# Patient Record
Sex: FEMALE | Race: WHITE | Employment: FULL TIME | URBAN - METROPOLITAN AREA
[De-identification: names, ages, dates, MRNs, and addresses within clinical notes are randomized per-mention and may not be internally consistent; named-entity substitution may affect disease eponyms.]

---

## 2019-03-13 ENCOUNTER — HOSPITAL ENCOUNTER (EMERGENCY)
Age: 39
Discharge: HOME | End: 2019-03-13
Payer: OTHER MISCELLANEOUS

## 2019-03-13 ENCOUNTER — APPOINTMENT (OUTPATIENT)
Dept: CT IMAGING | Age: 39
End: 2019-03-13
Payer: OTHER MISCELLANEOUS

## 2019-03-13 VITALS
DIASTOLIC BLOOD PRESSURE: 83 MMHG | HEIGHT: 62 IN | WEIGHT: 136 LBS | TEMPERATURE: 98.4 F | SYSTOLIC BLOOD PRESSURE: 123 MMHG | BODY MASS INDEX: 25.03 KG/M2 | HEART RATE: 82 BPM | OXYGEN SATURATION: 99 % | RESPIRATION RATE: 16 BRPM

## 2019-03-13 DIAGNOSIS — S16.1XXA ACUTE STRAIN OF NECK MUSCLE, INITIAL ENCOUNTER: Primary | ICD-10-CM
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
## 2019-03-13 LAB
HCG, URINE, POC: NEGATIVE
Lab: NORMAL
NEGATIVE QC PASS/FAIL: NORMAL
POSITIVE QC PASS/FAIL: NORMAL

## 2019-03-13 PROCEDURE — 96372 THER/PROPH/DIAG INJ SC/IM: CPT

## 2019-03-13 PROCEDURE — 72125 CT NECK SPINE W/O DYE: CPT

## 2019-03-13 PROCEDURE — 6360000002 HC RX W HCPCS: Performed by: NURSE PRACTITIONER

## 2019-03-13 PROCEDURE — 70450 CT HEAD/BRAIN W/O DYE: CPT

## 2019-03-13 PROCEDURE — 99284 EMERGENCY DEPT VISIT MOD MDM: CPT

## 2019-03-13 RX ORDER — ORPHENADRINE CITRATE 30 MG/ML
60 INJECTION INTRAMUSCULAR; INTRAVENOUS ONCE
Status: COMPLETED | OUTPATIENT
Start: 2019-03-13 | End: 2019-03-13

## 2019-03-13 RX ORDER — NAPROXEN 500 MG/1
500 TABLET ORAL 2 TIMES DAILY PRN
Qty: 28 TABLET | Refills: 0 | Status: ON HOLD | OUTPATIENT
Start: 2019-03-13 | End: 2022-09-09 | Stop reason: HOSPADM

## 2019-03-13 RX ORDER — ORPHENADRINE CITRATE 100 MG/1
100 TABLET, EXTENDED RELEASE ORAL 2 TIMES DAILY
Qty: 20 TABLET | Refills: 0 | Status: SHIPPED | OUTPATIENT
Start: 2019-03-13 | End: 2019-03-23

## 2019-03-13 RX ADMIN — ORPHENADRINE CITRATE 60 MG: 60 INJECTION INTRAMUSCULAR; INTRAVENOUS at 21:17

## 2019-03-13 ASSESSMENT — PAIN SCALES - GENERAL: PAINLEVEL_OUTOF10: 5

## 2020-05-05 ENCOUNTER — HOSPITAL ENCOUNTER (OUTPATIENT)
Age: 40
Discharge: HOME | End: 2020-05-07
Payer: COMMERCIAL

## 2020-05-05 PROCEDURE — U0003 INFECTIOUS AGENT DETECTION BY NUCLEIC ACID (DNA OR RNA); SEVERE ACUTE RESPIRATORY SYNDROME CORONAVIRUS 2 (SARS-COV-2) (CORONAVIRUS DISEASE [COVID-19]), AMPLIFIED PROBE TECHNIQUE, MAKING USE OF HIGH THROUGHPUT TECHNOLOGIES AS DESCRIBED BY CMS-2020-01-R: HCPCS

## 2020-05-07 LAB
SARS-COV-2: NOT DETECTED
SOURCE: NORMAL

## 2021-02-16 LAB
SARS-COV-2: NOT DETECTED
SOURCE: NORMAL

## 2021-02-23 LAB
SARS-COV-2: NOT DETECTED
SOURCE: NORMAL

## 2021-02-27 LAB — SARS-COV-2, PCR: NOT DETECTED

## 2021-03-06 LAB
SARS-COV-2: NOT DETECTED
SOURCE: NORMAL

## 2021-03-12 LAB
SARS-COV-2: NOT DETECTED
SOURCE: NORMAL

## 2021-03-19 LAB
SARS-COV-2: NOT DETECTED
SOURCE: NORMAL

## 2021-03-31 LAB
SARS-COV-2: NOT DETECTED
SOURCE: NORMAL

## 2021-04-08 LAB
SARS-COV-2: NOT DETECTED
SOURCE: NORMAL

## 2021-04-09 LAB
SARS-COV-2: NOT DETECTED
SOURCE: NORMAL

## 2021-04-14 LAB
SARS-COV-2: NOT DETECTED
SOURCE: NORMAL

## 2021-04-21 LAB
SARS-COV-2: NOT DETECTED
SOURCE: NORMAL

## 2021-04-27 LAB
SARS-COV-2: NOT DETECTED
SOURCE: NORMAL

## 2021-05-05 LAB
SARS-COV-2: NOT DETECTED
SOURCE: NORMAL

## 2021-05-12 LAB
SARS-COV-2: NOT DETECTED
SOURCE: NORMAL

## 2021-06-15 LAB
SARS-COV-2: NOT DETECTED
SOURCE: NORMAL

## 2021-09-08 LAB
SARS-COV-2: NOT DETECTED
SOURCE: NORMAL

## 2021-10-06 ENCOUNTER — OFFICE VISIT (OUTPATIENT)
Dept: FAMILY MEDICINE CLINIC | Age: 41
End: 2021-10-06
Payer: COMMERCIAL

## 2021-10-06 VITALS
TEMPERATURE: 97 F | WEIGHT: 138 LBS | BODY MASS INDEX: 25.24 KG/M2 | DIASTOLIC BLOOD PRESSURE: 72 MMHG | SYSTOLIC BLOOD PRESSURE: 126 MMHG | HEART RATE: 64 BPM | OXYGEN SATURATION: 100 %

## 2021-10-06 DIAGNOSIS — Z20.822 SUSPECTED COVID-19 VIRUS INFECTION: Primary | ICD-10-CM

## 2021-10-06 DIAGNOSIS — R11.2 NAUSEA AND VOMITING, INTRACTABILITY OF VOMITING NOT SPECIFIED, UNSPECIFIED VOMITING TYPE: ICD-10-CM

## 2021-10-06 DIAGNOSIS — R10.84 GENERALIZED ABDOMINAL PAIN: ICD-10-CM

## 2021-10-06 LAB
Lab: NORMAL
PERFORMING INSTRUMENT: NORMAL
QC PASS/FAIL: NORMAL
SARS-COV-2, POC: NORMAL

## 2021-10-06 PROCEDURE — 1036F TOBACCO NON-USER: CPT | Performed by: PHYSICIAN ASSISTANT

## 2021-10-06 PROCEDURE — G8419 CALC BMI OUT NRM PARAM NOF/U: HCPCS | Performed by: PHYSICIAN ASSISTANT

## 2021-10-06 PROCEDURE — G8427 DOCREV CUR MEDS BY ELIG CLIN: HCPCS | Performed by: PHYSICIAN ASSISTANT

## 2021-10-06 PROCEDURE — G8484 FLU IMMUNIZE NO ADMIN: HCPCS | Performed by: PHYSICIAN ASSISTANT

## 2021-10-06 PROCEDURE — 87426 SARSCOV CORONAVIRUS AG IA: CPT | Performed by: PHYSICIAN ASSISTANT

## 2021-10-06 PROCEDURE — 99203 OFFICE O/P NEW LOW 30 MIN: CPT | Performed by: PHYSICIAN ASSISTANT

## 2021-10-06 NOTE — PROGRESS NOTES
500 MG tablet, Take 1 tablet by mouth 2 times daily as needed for Pain, Disp: 28 tablet, Rfl: 0    ibuprofen (IBU) 600 MG tablet, Take 1 tablet by mouth every 8 hours as needed for Pain., Disp: 20 tablet, Rfl: 0    traMADol (ULTRAM) 50 MG tablet, Take 50 mg by mouth every 6 hours as needed. , Disp: , Rfl:     tamsulosin (FLOMAX) 0.4 MG capsule, Take 1 capsule by mouth daily for 30 days. , Disp: 30 capsule, Rfl: 0    ondansetron (ZOFRAN) 4 MG tablet, Take 1 tablet by mouth every 8 hours as needed for Nausea., Disp: 20 tablet, Rfl: 0    Allergies:   No Known Allergies    Social History:     Social History     Tobacco Use    Smoking status: Never Smoker    Smokeless tobacco: Never Used   Substance Use Topics    Alcohol use: No    Drug use: No       Patient lives at home. Physical Exam:     Vitals:    10/06/21 1022   BP: 126/72   Pulse: 64   Temp: 97 °F (36.1 °C)   SpO2: 100%   Weight: 138 lb (62.6 kg)       Exam:  Physical Exam  Nurses note and vital signs reviewed and patient is not hypoxic. General: The patient appears well and in no apparent distress. Patient is resting comfortably on cart. Skin: Warm, dry, no pallor noted. There is no rash noted. Head: Normocephalic, atraumatic. Eye: Normal conjunctiva  Ears, Nose, Mouth, and Throat: Wearing mask  Cardiovascular: Regular Rate and Rhythm  Respiratory: Patient is in no distress, no accessory muscle use, lungs are clear to auscultation, no wheezing, crackles or rhonchi  Back: Non-tender, no CVA tenderness bilaterally to percussion. GI: Normal bowel sounds, no tenderness to palpation, no masses appreciated. No rebound, guarding, or rigidity noted.   Musculoskeletal: Normal range of motion of the upper and lower extremities  Neurological: A&O x4, normal speech        Testing:     Results for orders placed or performed in visit on 10/06/21   POCT COVID-19, Antigen   Result Value Ref Range    SARS-COV-2, POC Not-Detected Not Detected    Lot Number [id number] QC Pass/Fail pass     Performing Instrument BD Veritor            Medical Decision Making:     Vital signs reviewed    Past medical history reviewed. Allergies reviewed. Medications reviewed. Patient on arrival does not appear to be in any apparent distress or discomfort. The patient has been seen and evaluated. The patient does not appear to be toxic or lethargic. The patient had a Covid test that was negative here in the office. The patient will have PCR testing. The patient will continue to monitor symptoms. The patient is refusing to be evaluated in the emergency department at this time. The patient will be contacted with results of the Covid testing. The patient is to return to express care or go directly to the emergency department should any of the signs or symptoms worsen. The patient is to followup with primary care physician in 2-3 days for repeat evaluation. The patient has no other questions or concerns at this time the patient will be discharged home. Clinical Impression:   Janina was seen today for abdominal pain, emesis and concern for covid-19. Diagnoses and all orders for this visit:    Suspected COVID-19 virus infection  -     POCT COVID-19, Antigen  -     COVID-19 Ambulatory; Future    Generalized abdominal pain    Nausea and vomiting, intractability of vomiting not specified, unspecified vomiting type        The patient is to call for any concerns or return if any of the signs or symptoms worsen. The patient is to follow-up with PCP in the next 2-3 days for repeat evaluation repeat assessment or go directly to the emergency department.      SIGNATURE: Grecia Beck III, PA-C

## 2022-03-03 ENCOUNTER — INITIAL PRENATAL (OUTPATIENT)
Dept: OBGYN | Age: 42
End: 2022-03-03
Payer: COMMERCIAL

## 2022-03-03 VITALS
BODY MASS INDEX: 29.26 KG/M2 | HEART RATE: 101 BPM | DIASTOLIC BLOOD PRESSURE: 63 MMHG | SYSTOLIC BLOOD PRESSURE: 139 MMHG | WEIGHT: 160 LBS

## 2022-03-03 DIAGNOSIS — Z34.91 PRENATAL CARE IN FIRST TRIMESTER: ICD-10-CM

## 2022-03-03 DIAGNOSIS — Z98.891 HISTORY OF CESAREAN DELIVERY: ICD-10-CM

## 2022-03-03 DIAGNOSIS — Z12.4 SCREENING FOR CERVICAL CANCER: Primary | ICD-10-CM

## 2022-03-03 DIAGNOSIS — O09.521 MULTIGRAVIDA OF ADVANCED MATERNAL AGE IN FIRST TRIMESTER: ICD-10-CM

## 2022-03-03 LAB
AMPHETAMINE SCREEN, URINE: NOT DETECTED
BARBITURATE SCREEN URINE: NOT DETECTED
BENZODIAZEPINE SCREEN, URINE: NOT DETECTED
CANNABINOID SCREEN URINE: NOT DETECTED
COCAINE METABOLITE SCREEN URINE: NOT DETECTED
FENTANYL SCREEN, URINE: NOT DETECTED
Lab: NORMAL
METHADONE SCREEN, URINE: NOT DETECTED
OPIATE SCREEN URINE: NOT DETECTED
OXYCODONE URINE: NOT DETECTED
PHENCYCLIDINE SCREEN URINE: NOT DETECTED

## 2022-03-03 PROCEDURE — G8427 DOCREV CUR MEDS BY ELIG CLIN: HCPCS | Performed by: OBSTETRICS & GYNECOLOGY

## 2022-03-03 PROCEDURE — G8419 CALC BMI OUT NRM PARAM NOF/U: HCPCS | Performed by: OBSTETRICS & GYNECOLOGY

## 2022-03-03 PROCEDURE — 1036F TOBACCO NON-USER: CPT | Performed by: OBSTETRICS & GYNECOLOGY

## 2022-03-03 PROCEDURE — G8484 FLU IMMUNIZE NO ADMIN: HCPCS | Performed by: OBSTETRICS & GYNECOLOGY

## 2022-03-03 PROCEDURE — 99203 OFFICE O/P NEW LOW 30 MIN: CPT | Performed by: OBSTETRICS & GYNECOLOGY

## 2022-03-03 PROCEDURE — 99204 OFFICE O/P NEW MOD 45 MIN: CPT | Performed by: OBSTETRICS & GYNECOLOGY

## 2022-03-03 NOTE — PROGRESS NOTES
Yao Justice     Patient presents for new OB visit. Patient is 10w5d by LMP. Patient states she may have failed her glucose test with her last baby but did not have gestational diabetes. Baby was 9 pounds 1 ounce and had problems regulating his sugar. Will order early glucola. Prenatal labs ordered, referral placed to [facility]. Dating scan ordered as well. Patient has had three prior  sections. Will be a repeat c/s at 39 weeks. Advanced maternal age. Toxins, food, vaccines reviewed. Reviewed COVID vaccine, including benefits to mother and fetus of vaccine and risks of COVID infection during pregnancy. Patient has been vaccinated against COVID. Cell free DNA reviewed, patient would like. [id number] ordered today. Patient is taking a prenatal vitamin daily. History reviewed. No pertinent past medical history. Past Surgical History:   Procedure Laterality Date     SECTION      HERNIA REPAIR      TYMPANOSTOMY TUBE PLACEMENT          History reviewed. No pertinent family history. Social History     Tobacco History     Smoking Status  Never Smoker    Smokeless Tobacco Use  Never Used          Alcohol History     Alcohol Use Status  No          Drug Use     Drug Use Status  No          Sexual Activity     Sexually Active  Not Asked                  Current Outpatient Medications:     naproxen (NAPROSYN) 500 MG tablet, Take 1 tablet by mouth 2 times daily as needed for Pain (Patient not taking: Reported on 3/3/2022), Disp: 28 tablet, Rfl: 0    ibuprofen (IBU) 600 MG tablet, Take 1 tablet by mouth every 8 hours as needed for Pain., Disp: 20 tablet, Rfl: 0    traMADol (ULTRAM) 50 MG tablet, Take 50 mg by mouth every 6 hours as needed. (Patient not taking: Reported on 3/3/2022), Disp: , Rfl:     tamsulosin (FLOMAX) 0.4 MG capsule, Take 1 capsule by mouth daily for 30 days. , Disp: 30 capsule, Rfl: 0    ondansetron (ZOFRAN) 4 MG tablet, Take 1 tablet by mouth every 8 hours as needed for Nausea. (Patient not taking: Reported on 3/3/2022), Disp: 20 tablet, Rfl: 0     No Known Allergies     There were no vitals filed for this visit. Physical Exam:  General: pleasant, alert     Breasts: deferred     Pelvic exam: normal external genitalia, vulva, vagina, cervix, uterus and adnexa. Elis Richey was seen today for initial prenatal visit, other and nausea & vomiting. Diagnoses and all orders for this visit:    Screening for cervical cancer  -     PAP SMEAR    Prenatal care in first trimester  -      OB LESS THAN 14 WEEKS SINGLE OR FIRST GESTATION; Future  -     Ambulatory referral to Maternal Fetal  -     CBC; Future  -     Cystic fibrosis carrier study; Future  -     Miscellaneous Sendout; Future  -     Hepatitis B Surface Antigen; Future  -     Hepatitis C Antibody; Future  -     HIV Screen; Future  -     Varicella Zoster Antibody, IgG; Future  -     Urine Drug Screen; Future  -     Culture, Urine; Future  -     Type and Screen; Future  -     Miscellaneous Sendout; Future  -     Rubella; Future  -     RPR Reflex to Titer and TPPA; Future  -     Glucose tolerance, 1 hour; Future  -     Miscellaneous sendout 3; Future    Multigravida of advanced maternal age in first trimester  -     Ambulatory referral to Maternal Fetal    History of  delivery  -     Ambulatory referral to Maternal Fetal          Return in about 4 weeks (around 3/31/2022) for OB visit.      Marlee Taylor MD

## 2022-03-03 NOTE — PROGRESS NOTES
Patient alert and pleasant with no complaints. Here today for prenatal visit. Fetal heart tones obtained without difficulty. Urine for pregnancy obtained with positive  Results. Pelvic exam, pap smear obtained, labeled and hand delivered to lab. Urine obtained, labled and sent to lab  Lab requisitions given and pateint did leave without the MaterniT 21 kit . Will call patgient   Discharge instructions have been discussed with the patient. Patient advised to call our office with any questions or concerns. Voiced understanding.

## 2022-03-05 LAB — URINE CULTURE, ROUTINE: NORMAL

## 2022-03-07 LAB
CHLAMYDIA BY THIN PREP: NEGATIVE
N. GONORRHOEAE DNA, THIN PREP: NEGATIVE
SOURCE: NORMAL

## 2022-03-08 ENCOUNTER — HOSPITAL ENCOUNTER (OUTPATIENT)
Age: 42
Discharge: HOME | End: 2022-03-08
Payer: COMMERCIAL

## 2022-03-08 DIAGNOSIS — Z34.91 PRENATAL CARE IN FIRST TRIMESTER: ICD-10-CM

## 2022-03-08 LAB
ABO/RH: NORMAL
ANTIBODY SCREEN: NORMAL
GLUCOSE TOLERANCE SCREEN 50G: 123 MG/DL (ref 70–140)
HCT VFR BLD CALC: 36 % (ref 34–48)
HEMOGLOBIN: 11.8 G/DL (ref 11.5–15.5)
Lab: NORMAL
MCH RBC QN AUTO: 28.9 PG (ref 26–35)
MCHC RBC AUTO-ENTMCNC: 32.8 % (ref 32–34.5)
MCV RBC AUTO: 88.2 FL (ref 80–99.9)
PDW BLD-RTO: 12.8 FL (ref 11.5–15)
PLATELET # BLD: 310 E9/L (ref 130–450)
PMV BLD AUTO: 9.9 FL (ref 7–12)
RBC # BLD: 4.08 E12/L (ref 3.5–5.5)
REPORT: NORMAL
THIS TEST SENT TO: NORMAL
WBC # BLD: 10.4 E9/L (ref 4.5–11.5)

## 2022-03-08 PROCEDURE — 83021 HEMOGLOBIN CHROMOTOGRAPHY: CPT

## 2022-03-08 PROCEDURE — 86762 RUBELLA ANTIBODY: CPT

## 2022-03-08 PROCEDURE — 36415 COLL VENOUS BLD VENIPUNCTURE: CPT

## 2022-03-08 PROCEDURE — 86900 BLOOD TYPING SEROLOGIC ABO: CPT

## 2022-03-08 PROCEDURE — 85027 COMPLETE CBC AUTOMATED: CPT

## 2022-03-08 PROCEDURE — 87340 HEPATITIS B SURFACE AG IA: CPT

## 2022-03-08 PROCEDURE — 86850 RBC ANTIBODY SCREEN: CPT

## 2022-03-08 PROCEDURE — 86803 HEPATITIS C AB TEST: CPT

## 2022-03-08 PROCEDURE — 81329 SMN1 GENE DOS/DELETION ALYS: CPT

## 2022-03-08 PROCEDURE — 86901 BLOOD TYPING SEROLOGIC RH(D): CPT

## 2022-03-08 PROCEDURE — 83020 HEMOGLOBIN ELECTROPHORESIS: CPT

## 2022-03-08 PROCEDURE — 82950 GLUCOSE TEST: CPT

## 2022-03-08 PROCEDURE — 86703 HIV-1/HIV-2 1 RESULT ANTBDY: CPT

## 2022-03-08 PROCEDURE — 81220 CFTR GENE COM VARIANTS: CPT

## 2022-03-08 PROCEDURE — 86592 SYPHILIS TEST NON-TREP QUAL: CPT

## 2022-03-08 PROCEDURE — 86787 VARICELLA-ZOSTER ANTIBODY: CPT

## 2022-03-09 LAB
HEPATITIS B SURFACE ANTIGEN INTERPRETATION: NORMAL
HEPATITIS C ANTIBODY INTERPRETATION: NORMAL
HIV-1 AND HIV-2 ANTIBODIES: NORMAL
RPR: NORMAL
RUBELLA ANTIBODY IGG: NORMAL

## 2022-03-10 LAB — VARICELLA-ZOSTER VIRUS AB, IGG: NORMAL

## 2022-03-13 LAB
Lab: NORMAL
REPORT: NORMAL
THIS TEST SENT TO: NORMAL

## 2022-03-15 LAB
CYSTIC FIBROSIS 165 VARIANTS INTERP: NORMAL
CYSTIC FIBROSIS 5T VARIANT: NORMAL
CYSTIC FIBROSIS ALLELE 1: NEGATIVE
CYSTIC FIBROSIS ALLELE 2: NEGATIVE

## 2022-03-16 LAB
INTERPRETATION: NORMAL
SMA COPY NUMBER, LINKED VARIANT: NORMAL
SMA COPY NUMBER, SMN1 COPIES: NORMAL
SMA COPY NUMBER, SMN2 COPIES: NORMAL
SMA COPY NUMBER, SPECIMEN: NORMAL
SMA COPY NUMBER, SYMPTOMS: NORMAL

## 2022-03-21 ENCOUNTER — INITIAL PRENATAL (OUTPATIENT)
Dept: OBGYN CLINIC | Age: 42
End: 2022-03-21
Payer: COMMERCIAL

## 2022-03-21 ENCOUNTER — ANCILLARY PROCEDURE (OUTPATIENT)
Dept: OBGYN CLINIC | Age: 42
End: 2022-03-21
Payer: COMMERCIAL

## 2022-03-21 VITALS
DIASTOLIC BLOOD PRESSURE: 78 MMHG | HEART RATE: 91 BPM | WEIGHT: 160.25 LBS | BODY MASS INDEX: 29.31 KG/M2 | SYSTOLIC BLOOD PRESSURE: 117 MMHG

## 2022-03-21 DIAGNOSIS — Z57.9 OCCUPATIONAL EXPOSURE IN WORKPLACE: ICD-10-CM

## 2022-03-21 DIAGNOSIS — Z86.2 HISTORY OF ANEMIA: ICD-10-CM

## 2022-03-21 DIAGNOSIS — O09.291 HISTORY OF MACROSOMIA IN INFANT IN PRIOR PREGNANCY, CURRENTLY PREGNANT IN FIRST TRIMESTER: ICD-10-CM

## 2022-03-21 DIAGNOSIS — O09.521 MULTIGRAVIDA OF ADVANCED MATERNAL AGE IN FIRST TRIMESTER: ICD-10-CM

## 2022-03-21 DIAGNOSIS — Z3A.13 13 WEEKS GESTATION OF PREGNANCY: Primary | ICD-10-CM

## 2022-03-21 LAB
GLUCOSE URINE, POC: NEGATIVE
PROTEIN UA: NEGATIVE

## 2022-03-21 PROCEDURE — 81002 URINALYSIS NONAUTO W/O SCOPE: CPT | Performed by: OBSTETRICS & GYNECOLOGY

## 2022-03-21 PROCEDURE — G8484 FLU IMMUNIZE NO ADMIN: HCPCS | Performed by: OBSTETRICS & GYNECOLOGY

## 2022-03-21 PROCEDURE — 99244 OFF/OP CNSLTJ NEW/EST MOD 40: CPT | Performed by: OBSTETRICS & GYNECOLOGY

## 2022-03-21 PROCEDURE — 76813 OB US NUCHAL MEAS 1 GEST: CPT | Performed by: OBSTETRICS & GYNECOLOGY

## 2022-03-21 PROCEDURE — 76801 OB US < 14 WKS SINGLE FETUS: CPT | Performed by: OBSTETRICS & GYNECOLOGY

## 2022-03-21 PROCEDURE — 99203 OFFICE O/P NEW LOW 30 MIN: CPT | Performed by: OBSTETRICS & GYNECOLOGY

## 2022-03-21 PROCEDURE — G8419 CALC BMI OUT NRM PARAM NOF/U: HCPCS | Performed by: OBSTETRICS & GYNECOLOGY

## 2022-03-21 PROCEDURE — G8427 DOCREV CUR MEDS BY ELIG CLIN: HCPCS | Performed by: OBSTETRICS & GYNECOLOGY

## 2022-03-21 SDOH — HEALTH STABILITY - PHYSICAL HEALTH: OCCUPATIONAL EXPOSURE TO UNSPECIFIED RISK FACTOR: Z57.9

## 2022-03-21 NOTE — PROGRESS NOTES
Pt here is for bpp  Pt denies lof,vag bleeding or cramping  Pt voiced did have spotting 1 time initially this pregnancy but none since.

## 2022-03-21 NOTE — PROGRESS NOTES
2022      Kenda Severe, MD  [facility],  [address]     RE:  Tevin Rodrigues  : 1980   AGE: 46 y.o. This report has been created using voice recognition software. It may contain errors which are inherent in voice recognition technology. Dear Dr. Marylu Burris:    I had the pleasure of meeting with Ms. Mcfarlane for a consultation. As you know, Ms. Nicole Benites is a 46 y.o.  at 14w3d (14 wk US) who was referred to our office for counseling secondary to advanced maternal age. The patient's medical records and laboratory workup were reviewed. The patient's history was reviewed and outlined below. Today, Ms. Mcfarlane reports that she feels well. She denies any symptoms of leaking of fluid, vaginal bleeding, and/or contractions. She had a fetal ultrasound that was notable for the following. There is a single intrauterine gestation in a variable presentation with a heart rate of 155 beats per minute. The placenta is anterior. The crown rump length is consistent with 14w3d. The nuchal translucency is normal at 2.2 mm. PAST OBSTETRICAL HISTORY:  4/15/2005 -- 40 week  for breech presentation, in labor. She delivered a 7lb 3.5 oz female Kayceehaile Orozco). She denies having any other complications with the pregnancy, delivery, and/or postpartum recovery. She reports that her daughter is living and well. Partner #1    6/10/2011 -- 38 week PROM, repeat . She delivered a 7lb 3oz male Andrés Armenta). She denies having any other complications with the pregnancy, delivery, and/or postpartum recovery. She reports that her male is living and well. Partner #2    2015 -- 39 week repeat . She delivered a 9lb 1oz male Sophia Long). She denies having any other complications with the pregnancy, delivery, and/or postpartum recovery. She reports that her son is living and well.  Partner #3    Current pregnancy, partner #4      PAST GYNECOLOGICAL  HISTORY:  Positive for abnormal pap smears. Two in the past, all normal since the last.   Positive for sexually transmitted diseases. HPV after delivery in 2015; it went away. Negative for cervical LEEP / conization /cryosurgery. Positive for uterine surgery.  x3  Negative for ovarian or tubal surgery. PAST MEDICAL HISTORY:    MEDICATIONS:  Prenatal vitamin    ILLNESSES:  None    BLOOD TRANSFUSIONS:  None    IMMUNIZATIONS:  Up-to-date, s/p flu vaccine, s/p COVID vaccine    SURGERIES:   x3, ear tubes, umbilical hernia, T&A  She denies any issues with anesthesia    HOSPITALIZATIONS:  Surgery, child birth    ALLERGIES:  NKDA  She denies any latex or shellfish allergies    SOCIAL HISTORY:  She denies any tobacco, alcohol, or drug use  She works as an [employer] at [employer]:   Negative for congenital abnormalities, autism, genetic disease and mental retardation, not listed above. Cancer None  CAD Father -- CHTN  CVA None  Congenital anomalies None  DM Father  DVT None  Bleeding disorders ? Nephew (sister's son) bleeding disorder  Autoimmune disorders Mother -- thyroid disease    Review of Systems :   CONSTITUTIONAL : No fever, no chills   HEENT : No headache, no visual changes, no rhinorrhea, no sore throat   CARDIOVASCULAR : No pain, no palpitations, no edema   RESPIRATORY : No pain, no shortness of breath   GASTROINTESTINAL : No N/V, no D/C, no abdominal pain   GENITOURINARY : No dysuria, hematuria and no incontinence   MUSCULOSKELETAL : No myalgia, No back pain  NEUROLOGICAL : No numbness, no tingling, no tremors. No history of seizures  ALL OTHER SYSTEMS WERE REPORTED AS NEGATIVE.     PERTINENT PHYSICAL EXAMINATION:   /78   Pulse 91   Wt 160 lb 4 oz (72.7 kg)   LMP 2021   BMI 29.31 kg/m²     Urine dipstick:   No urine sample      GENERAL:   The patient is a well developed, female who is alert cooperative and oriented times three in no acute distress. HEENT:  Normo cephalic and atraumatic. No facial edema. ABDOMEN:   Her uterus is gravid. She had no complaint of abdominal pain or tenderness. EXTREMITIES:  No peripheral edema is noted. A fetal ultrasound assessment was performed today. A report is enclosed for your review. A long conversation was had with the patient regarding her pregnancy and management. The following counseling was provided to the patient:      Assessment & Plan:  46 y.o.  at 14w3d ([address]) with:    1. Pregnancy dating -- The patient's pregnancy dating was reviewed. The patient reports that she had her Mirena removed on 2021. She was tracking her periods. Per the vickie she was using, she was having 25-day cycles. She reported a last menstrual period of 2021. However, she had 2 periods in December. Her first was on 2021. This would have been a 16-day cycle. Today's ultrasound was reviewed with the patient. Based on her last menstrual period, she is 13 weeks 2 days, SHARA 2022. Based on today's measurements, she is 14 weeks 3 days, SHARA 2022. There is an 8-day difference between dating by her LMP and today's ultrasound. Given there is greater than a 7-day discrepancy, the recommendation was made to use today's ultrasound for dating. Thus, she is 14 weeks 3 days today with an SHARA of 2022. The patient was scheduled to return in 3 to 4 weeks to reevaluate fetal growth. 2.  Advanced maternal age  -- The patient was counseled regarding the implications of advanced maternal age in pregnancy, specifically that of aneuploidy. At age 46, the risk of having a child with trisomy 24 is ~1/71 and the risk for any chromosomal abnormality may be as high as 1/53. Today's ultrasound was reviewed with the patient. The crown-rump length was appropriate for the gestational age, the nuchal translucency was normal, and the nasal bone was seen.   No markers for aneuploidy were noted today. Her options for genetic screening with cfDNA and/or diagnostic testing with amniocentesis were discussed. The risks and benefits were reviewed. The patient stated that she previously completed screening with NIPT. Her results were available for review. The fetal fraction was 9% and the results were low risk for aneuploidy. Per the report, the fetus is female. The genetic screening results were reviewed with the patient. The fetal sex was not. The patient declined any additional diagnostic testing. Since the patient had early screening with NIPT, a maternal serum AFP is recommended between 15 and 22 weeks to screen for open neural tube defects. Recent studies have reported that there may be an increased risk for fetal loss in the setting of advanced maternal age. Thus, increased surveillance is recommended. I recommend monitoring fetal growth serially, every 4 weeks beginning at 24-26 weeks' gestation. She should monitor fetal kick counts daily starting at 28 weeks' gestation. At 36 weeks' gestation, she should be scheduled for a weekly non stress test or biophysical profile. I recommend delivery at 39-40 week's gestation. Given there is an increased risk for hypertensive disorders of pregnancy in the setting of advanced maternal age, the possible utility of a low dose aspirin in reducing her risk for hypertensive disorders of pregnancy was reviewed. The risks and benefits of low dose aspirin were discussed. She was counseled that she could take 81 mg of aspirin, daily. 3. History of  X3 -- The patient's first pregnancy was delivered via  secondary to a breech presentation. She then had a 2 repeat C-sections. The placenta is anterior. The placental location will need to be reevaluated on follow-up ultrasound. She plans to have a repeat  for delivery.      4. History of anemia -- The patient reported that her prior pregnancies were complicated by anemia. Secondary to this history, a baseline nutrition panel and thyroid function studies were recommended. Testing was ordered. 5.  History of macrosomia -- The patient's third child weighed 9 pounds 1 ounce at 39 weeks gestation. She had a repeat  for delivery. She denies having a history of gestational diabetes. Because of this history, she is at risk for having another child with macrosomia. Fetal growth should be monitored serially, every 3 to 4 weeks starting at 24 to 26 weeks gestation. The patient had a baseline Glucola on 3/8/2022 that was normal at 128. She should have repeat screening at 24 to 28 weeks gestation. 6.  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 She was counseled that she may be at increased risk for viral exposures such as CMV and parvovirus. She was given orders to have baseline screening for these viruses. Precautions were reviewed. 7.  Family history of bleeding disorder -- The patient reported that her nephew (sister's son) has some type of bleeding disorder. She was unsure of the name of the condition. She was encouraged to obtain additional information regarding her nephew's condition. Additional counseling can be provided at her follow-up visit. 8.  Family history of thyroid disease -- The patient's family history is notable for thyroid disease in her mother. Given this family history, baseline thyroid function studies and a screening thyroid peroxidase antibody/antithyroglobulin antibody are recommended. 9.  History of ovarian cyst -- The patient reported a history of an ovarian cyst.  She was unsure which side the cyst was on. She was supposed to have follow-up with her primary OB/GYN but did not have the follow-up imaging. The ovaries were evaluated today and appeared normal bilaterally. No cystic structures were seen in the adnexa.   The reassuring findings were reviewed with the patient. 10.  Vaccination in pregnancy -- The patient was counseled regarding the recommendations for vaccination in pregnancy. The patient was counseled regarding the recommendations for the Tdap vaccination in pregnancy. Risks and benefits were discussed. The vaccination is typically administered between 27 and 36 weeks gestation and recommended to confer protection against whooping cough to the . The patient plans to discuss this with her primary provider at her next visit. The patient was also counseled that her partner in any individuals who will be in close contact with the  should also be vaccinated for whooping cough. The patient was counseled regarding recommendation for the flu vaccination in pregnancy for both maternal and infant safety. Risks and benefits were reviewed. She was encouraged to have this vaccination as an outpatient. --She received a flu vaccination    Counseling was provided regarding the recommendation for the Covid vaccination in pregnancy. I advised the patient that the [employer] of Obstetrics and Gynecology and The Society for Maternal Fetal Medicine recommend that all pregnant women be vaccinated for COVID-19. \"Data have shown that COVID-19 infection puts pregnant people at increased risk of severe complications and even death; yet only about 22% of pregnant individuals have received 1 more doses of COVID-19 vaccine according to the [employer] for Disease Control and Prevention. \"    \" Recent data have shown that more than 95% of those were hospitalized and/or dying from COVID-19 are those who have remained unvaccinated. Pregnant individuals who have decided to wait until after delivery to be vaccinated may be inadvertently exposing themselves to an increased risk of severe illness or death. \"     \" COVID-19 vaccination is the best testing to reduce maternal and fetal complications of OLKDS-65 infection among pregnant people,\" said Danielle Zhong Payam Wilkinson MD, president of the Society for Maternal Fetal Medicine, sub-specialists. The patient was counseled that in the event she opts not to have the Covid vaccination, she should alert her provider immediately if she test positive for Covid. Pregnant women are on the priority list for treatment with monoclonal antibody. This intervention has been shown to decrease the risk for hospitalization and complications related to Covid in pregnancy. --She received a Covid vaccination    The patient expressed verbal understanding of this counseling. --The patient was advised to call if she has any increased vaginal discharge, vaginal bleeding, contractions, abdominal pain, back pain or any new significant symptomatology prior to her next visit. I advised her that these are signs and symptoms of cervical change and require follow-up assessment when they occur. Preeclampsia precautions were also reviewed with the patient. --I requested the patient return for a follow-up assessment in 4 weeks unless there is a clinical reason for her to return prior to that time. She is to call if she has any problems or questions prior to her next visit. Further evaluation and management will be dependent on her clinical presentation and the results of her testing. --The patient is to continue to follow with you in your office for ongoing obstetric care. --The total time spent on today's visit was 60 minutes. This included preparation for the visit (i.e. reviewing prior external notes and test results), performance of a medically appropriate history and examination, counseling, orders for medications, tests or other procedures, and coordination of care. Greater than 50% of the time was spent face-to-face with the patient. This time is exclusive of procedures performed. --At the conclusion of the visit, the patient appeared to have a good understanding of the issues discussed.   I answered all of her questions to her satisfaction. I asked her to call if she had any additional questions prior to her next visit. --Thank you for allowing me to participate in the care of this pleasant patient. Please don't hesitate to call me if you have any questions. Sincerely,        Sarah Iniguez MD, [address] [place] [address]  [phone]    *All or parts of this note may have been generated using a voice recognition program. There may be typo, grammar, or Word substitution errors that have escaped my review of this note.

## 2022-03-21 NOTE — PATIENT INSTRUCTIONS
Please arrive for your scheduled appointment at least 15 minutes early with your actual insurance card+ a photo ID. Also if you need any refills ordered or have questions, it may take up 48 hours to reply. Please allow ample time for your refills. Call me when you use last refill. Thank you for your cooperation. You might be having an NST at your next appt. Please eat a large snack or breakfast before coming to office. Thank youCall your primary obstetrician with bleeding, leaking of fluid, abdominal tenderness, headache, blurry vision, epigastric pain and increased urinary frequency. If you are experiencing an emergency and need immediate help, call 911 or go to go emergency room or labor and delivery. if you are sick, not feeling well or have an infectious process going on please reschedule your appointment by calling [phone]. Also if any family members are not feeling well, please do not bring them to your appointment. We appreciate your cooperation. We are doing this in order to protect our pregnant mothers+ their babies. if you are sick, not feeling well or have an infectious process going on please reschedule your appointment by calling [phone]. Also if any family members are not feeling well, please do not bring them to your appointment. We appreciate your cooperation. We are doing this in order to protect our pregnant mothers+ their babies.

## 2022-03-21 NOTE — LETTER
[facility] Maternal Fetal Medicine  [address] [facility] [address]  Phone: [phone]  Fax: [phone]           Jono Quiñones MD      2022    Patient: Bishop Lino   MR Number: [id number]   YOB: 1980   Date of Visit: 3/21/2022       Dear Dr. Anabel Ruiz:    Thank you for referring Bishop Lino to me for evaluation/treatment. Below are the relevant portions of my assessment and plan of care. If you have questions, please do not hesitate to call me. I look forward to following [facility] MEDICINE along with you. Sincerely,        Jono Quiñones MD    CC providers:  Bert Herrera MD  [address]  [address] [facility]   2022      Bert Herrera MD  [address]  [facility],  [address] [facility]     RE:  Kesha Nichole  : 1980   AGE: 46 y.o. This report has been created using voice recognition software. It may contain errors which are inherent in voice recognition technology. Dear Dr. Anabel Ruiz:    I had the pleasure of meeting with Ms. Mcfarlane for a consultation. As you know, Ms. Surekha Cespedes is a 46 y.o.  at 14w3d (14 wk US) who was referred to our office for counseling secondary to advanced maternal age. The patient's medical records and laboratory workup were reviewed. The patient's history was reviewed and outlined below. Today, Ms. Mcfarlane reports that she feels well. She denies any symptoms of leaking of fluid, vaginal bleeding, and/or contractions. She had a fetal ultrasound that was notable for the following. There is a single intrauterine gestation in a variable presentation with a heart rate of 155 beats per minute. The placenta is anterior. The crown rump length is consistent with 14w3d. The nuchal translucency is normal at 2.2 mm. PAST OBSTETRICAL HISTORY:  4/15/2005 -- 40 week  for breech presentation, in labor. She delivered a 7lb 3.5 oz female Tanda Fothergill).   She denies having any other complications with the pregnancy, delivery, and/or postpartum recovery. She reports that her daughter is living and well. Partner #1    6/10/2011 -- 38 week PROM, repeat . She delivered a 7lb 3oz male Vince Krishnan). She denies having any other complications with the pregnancy, delivery, and/or postpartum recovery. She reports that her male is living and well. Partner #2    2015 -- 39 week repeat . She delivered a 9lb 1oz male Leti Ibarra). She denies having any other complications with the pregnancy, delivery, and/or postpartum recovery. She reports that her son is living and well. Partner #3    Current pregnancy, partner #4      PAST GYNECOLOGICAL  HISTORY:  Positive for abnormal pap smears. Two in the past, all normal since the last.   Positive for sexually transmitted diseases. HPV after delivery in ; it went away. Negative for cervical LEEP / conization /cryosurgery. Positive for uterine surgery.  x3  Negative for ovarian or tubal surgery. PAST MEDICAL HISTORY:    MEDICATIONS:  Prenatal vitamin    ILLNESSES:  None    BLOOD TRANSFUSIONS:  None    IMMUNIZATIONS:  Up-to-date, s/p flu vaccine, s/p COVID vaccine    SURGERIES:   x3, ear tubes, umbilical hernia, T&A  She denies any issues with anesthesia    HOSPITALIZATIONS:  Surgery, child birth    ALLERGIES:  NKDA  She denies any latex or shellfish allergies    SOCIAL HISTORY:  She denies any tobacco, alcohol, or drug use  She works as an [employer] at [address] [employer]:   Negative for congenital abnormalities, autism, genetic disease and mental retardation, not listed above. Cancer None  CAD Father -- CHTN  CVA None  Congenital anomalies None  DM Father  DVT None  Bleeding disorders ?  Nephew (sister's son) bleeding disorder  Autoimmune disorders Mother -- thyroid disease    Review of Systems :   CONSTITUTIONAL : No fever, no chills   HEENT : No headache, no visual changes, no rhinorrhea, no sore throat   CARDIOVASCULAR : No pain, no palpitations, no edema   RESPIRATORY : No pain, no shortness of breath   GASTROINTESTINAL : No N/V, no D/C, no abdominal pain   GENITOURINARY : No dysuria, hematuria and no incontinence   MUSCULOSKELETAL : No myalgia, No back pain  NEUROLOGICAL : No numbness, no tingling, no tremors. No history of seizures  ALL OTHER SYSTEMS WERE REPORTED AS NEGATIVE. PERTINENT PHYSICAL EXAMINATION:   /78   Pulse 91   Wt 160 lb 4 oz (72.7 kg)   LMP 2021   BMI 29.31 kg/m²     Urine dipstick:   No urine sample      GENERAL:   The patient is a well developed, female who is alert cooperative and oriented times three in no acute distress. HEENT:  Normo cephalic and atraumatic. No facial edema. ABDOMEN:   Her uterus is gravid. She had no complaint of abdominal pain or tenderness. EXTREMITIES:  No peripheral edema is noted. A fetal ultrasound assessment was performed today. A report is enclosed for your review. A long conversation was had with the patient regarding her pregnancy and management. The following counseling was provided to the patient:      Assessment & Plan:  46 y.o.  at 14w3d [facility] with:    1. Pregnancy dating -- The patient's pregnancy dating was reviewed. The patient reports that she had her Mirena removed on 2021. She was tracking her periods. Per the vickie she was using, she was having 25-day cycles. She reported a last menstrual period of 2021. However, she had 2 periods in December. Her first was on 2021. This would have been a 16-day cycle. Today's ultrasound was reviewed with the patient. Based on her last menstrual period, she is 13 weeks 2 days, SHARA 2022. Based on today's measurements, she is 14 weeks 3 days, SHARA 2022. There is an 8-day difference between dating by her LMP and today's ultrasound.   Given there is greater than a 7-day discrepancy, the recommendation was made to use today's ultrasound for dating. Thus, she is 14 weeks 3 days today with an SHARA of 9/16/2022. The patient was scheduled to return in 3 to 4 weeks to reevaluate fetal growth. 2.  Advanced maternal age  -- The patient was counseled regarding the implications of advanced maternal age in pregnancy, specifically that of aneuploidy. At age 46, the risk of having a child with trisomy 24 is ~1/71 and the risk for any chromosomal abnormality may be as high as 1/53. Today's ultrasound was reviewed with the patient. The crown-rump length was appropriate for the gestational age, the nuchal translucency was normal, and the nasal bone was seen. No markers for aneuploidy were noted today. Her options for genetic screening with cfDNA and/or diagnostic testing with amniocentesis were discussed. The risks and benefits were reviewed. The patient stated that she previously completed screening with NIPT. Her results were available for review. The fetal fraction was 9% and the results were low risk for aneuploidy. Per the report, the fetus is female. The genetic screening results were reviewed with the patient. The fetal sex was not. The patient declined any additional diagnostic testing. Since the patient had early screening with NIPT, a maternal serum AFP is recommended between 15 and 22 weeks to screen for open neural tube defects. Recent studies have reported that there may be an increased risk for fetal loss in the setting of advanced maternal age. Thus, increased surveillance is recommended. I recommend monitoring fetal growth serially, every 4 weeks beginning at 24-26 weeks' gestation. She should monitor fetal kick counts daily starting at 28 weeks' gestation. At 36 weeks' gestation, she should be scheduled for a weekly non stress test or biophysical profile. I recommend delivery at 39-40 week's gestation.     Given there is an increased risk for hypertensive disorders of pregnancy in the setting of advanced maternal age, the possible utility of a low dose aspirin in reducing her risk for hypertensive disorders of pregnancy was reviewed. The risks and benefits of low dose aspirin were discussed. She was counseled that she could take 81 mg of aspirin, daily. 3. History of  X3 -- The patient's first pregnancy was delivered via  secondary to a breech presentation. She then had a 2 repeat C-sections. The placenta is anterior. The placental location will need to be reevaluated on follow-up ultrasound. She plans to have a repeat  for delivery. 4. History of anemia -- The patient reported that her prior pregnancies were complicated by anemia. Secondary to this history, a baseline nutrition panel and thyroid function studies were recommended. Testing was ordered. 5.  History of macrosomia -- The patient's third child weighed 9 pounds 1 ounce at 39 weeks gestation. She had a repeat  for delivery. She denies having a history of gestational diabetes. Because of this history, she is at risk for having another child with macrosomia. Fetal growth should be monitored serially, every 3 to 4 weeks starting at 24 to 26 weeks gestation. The patient had a baseline Glucola on 3/8/2022 that was normal at 128. She should have repeat screening at 24 to 28 weeks gestation. 6.  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 She was counseled that she may be at increased risk for viral exposures such as CMV and parvovirus. She was given orders to have baseline screening for these viruses. Precautions were reviewed. 7.  Family history of bleeding disorder -- The patient reported that her nephew (sister's son) has some type of bleeding disorder. She was unsure of the name of the condition. She was encouraged to obtain additional information regarding her nephew's condition.   Additional counseling can be provided at her follow-up visit. 8.  Family history of thyroid disease -- The patient's family history is notable for thyroid disease in her mother. Given this family history, baseline thyroid function studies and a screening thyroid peroxidase antibody/antithyroglobulin antibody are recommended. 9.  History of ovarian cyst -- The patient reported a history of an ovarian cyst.  She was unsure which side the cyst was on. She was supposed to have follow-up with her primary OB/GYN but did not have the follow-up imaging. The ovaries were evaluated today and appeared normal bilaterally. No cystic structures were seen in the adnexa. The reassuring findings were reviewed with the patient. 10.  Vaccination in pregnancy -- The patient was counseled regarding the recommendations for vaccination in pregnancy. The patient was counseled regarding the recommendations for the Tdap vaccination in pregnancy. Risks and benefits were discussed. The vaccination is typically administered between 27 and 36 weeks gestation and recommended to confer protection against whooping cough to the . The patient plans to discuss this with her primary provider at her next visit. The patient was also counseled that her partner in any individuals who will be in close contact with the  should also be vaccinated for whooping cough. The patient was counseled regarding recommendation for the flu vaccination in pregnancy for both maternal and infant safety. Risks and benefits were reviewed. She was encouraged to have this vaccination as an outpatient. --She received a flu vaccination    Counseling was provided regarding the recommendation for the Covid vaccination in pregnancy. I advised the patient that the [employer] of Obstetrics and Gynecology and The Society for Maternal Fetal Medicine recommend that all pregnant women be vaccinated for COVID-19.   \"Data have shown that COVID-19 infection puts pregnant people at increased risk of severe complications and even death; yet only about 22% of pregnant individuals have received 1 more doses of COVID-19 vaccine according to the [employer] for Disease Control and Prevention. \"    \" Recent data have shown that more than 95% of those were hospitalized and/or dying from COVID-19 are those who have remained unvaccinated. Pregnant individuals who have decided to wait until after delivery to be vaccinated may be inadvertently exposing themselves to an increased risk of severe illness or death. \"     \" COVID-19 vaccination is the best testing to reduce maternal and fetal complications of JKKPN-60 infection among pregnant people,\" said Luis Bey MD, president of the Society for Maternal Fetal Medicine, sub-specialists. The patient was counseled that in the event she opts not to have the Covid vaccination, she should alert her provider immediately if she test positive for Covid. Pregnant women are on the priority list for treatment with monoclonal antibody. This intervention has been shown to decrease the risk for hospitalization and complications related to Covid in pregnancy. --She received a Covid vaccination    The patient expressed verbal understanding of this counseling. --The patient was advised to call if she has any increased vaginal discharge, vaginal bleeding, contractions, abdominal pain, back pain or any new significant symptomatology prior to her next visit. I advised her that these are signs and symptoms of cervical change and require follow-up assessment when they occur. Preeclampsia precautions were also reviewed with the patient. --I requested the patient return for a follow-up assessment in 4 weeks unless there is a clinical reason for her to return prior to that time. She is to call if she has any problems or questions prior to her next visit.  Further evaluation and management will be dependent on her clinical presentation and the results of her testing. --The patient is to continue to follow with you in your office for ongoing obstetric care. --The total time spent on today's visit was 60 minutes. This included preparation for the visit (i.e. reviewing prior external notes and test results), performance of a medically appropriate history and examination, counseling, orders for medications, tests or other procedures, and coordination of care. Greater than 50% of the time was spent face-to-face with the patient. This time is exclusive of procedures performed. --At the conclusion of the visit, the patient appeared to have a good understanding of the issues discussed. I answered all of her questions to her satisfaction. I asked her to call if she had any additional questions prior to her next visit. --Thank you for allowing me to participate in the care of this pleasant patient. Please don't hesitate to call me if you have any questions. Sincerely,        Gayle Hale MD, [address] [place] [address]  [phone]    *All or parts of this note may have been generated using a voice recognition program. There may be typo, grammar, or Word substitution errors that have escaped my review of this note.

## 2022-03-22 RX ORDER — ASPIRIN 81 MG/1
81 TABLET, CHEWABLE ORAL DAILY
Qty: 30 TABLET | Refills: 6 | Status: SHIPPED
Start: 2022-03-22 | End: 2022-09-01 | Stop reason: SDUPTHER

## 2022-03-25 PROBLEM — Z57.9 OCCUPATIONAL EXPOSURE IN WORKPLACE: Status: ACTIVE | Noted: 2022-03-25

## 2022-03-25 PROBLEM — O09.291 HISTORY OF MACROSOMIA IN INFANT IN PRIOR PREGNANCY, CURRENTLY PREGNANT IN FIRST TRIMESTER: Status: ACTIVE | Noted: 2022-03-25

## 2022-03-25 PROBLEM — O09.521 MULTIGRAVIDA OF ADVANCED MATERNAL AGE IN FIRST TRIMESTER: Status: ACTIVE | Noted: 2022-03-25

## 2022-03-25 PROBLEM — Z86.2 HISTORY OF ANEMIA: Status: ACTIVE | Noted: 2022-03-25

## 2022-03-29 ENCOUNTER — TELEPHONE (OUTPATIENT)
Dept: OBGYN CLINIC | Age: 42
End: 2022-03-29

## 2022-03-29 NOTE — TELEPHONE ENCOUNTER
I left message on Karin's phone to remind her of labs that Dr. Rosalba Moctezuma ordered and to see if she can get them drawn before her next office appt end of April.

## 2022-03-30 ENCOUNTER — HOSPITAL ENCOUNTER (OUTPATIENT)
Age: 42
Discharge: HOME | End: 2022-03-30
Payer: COMMERCIAL

## 2022-03-30 DIAGNOSIS — Z86.2 HISTORY OF ANEMIA: ICD-10-CM

## 2022-03-30 DIAGNOSIS — O09.521 MULTIGRAVIDA OF ADVANCED MATERNAL AGE IN FIRST TRIMESTER: ICD-10-CM

## 2022-03-30 DIAGNOSIS — O09.291 HISTORY OF MACROSOMIA IN INFANT IN PRIOR PREGNANCY, CURRENTLY PREGNANT IN FIRST TRIMESTER: ICD-10-CM

## 2022-03-30 DIAGNOSIS — Z57.9 OCCUPATIONAL EXPOSURE IN WORKPLACE: ICD-10-CM

## 2022-03-30 DIAGNOSIS — Z3A.13 13 WEEKS GESTATION OF PREGNANCY: ICD-10-CM

## 2022-03-30 LAB
ALBUMIN SERPL-MCNC: 3.8 G/DL (ref 3.5–5.2)
ALP BLD-CCNC: 72 U/L (ref 35–104)
ALT SERPL-CCNC: 20 U/L (ref 0–32)
ANION GAP SERPL CALCULATED.3IONS-SCNC: 10 MMOL/L (ref 7–16)
AST SERPL-CCNC: 18 U/L (ref 0–31)
BACTERIA: ABNORMAL /HPF
BASOPHILS ABSOLUTE: 0.06 E9/L (ref 0–0.2)
BASOPHILS RELATIVE PERCENT: 0.6 % (ref 0–2)
BILIRUB SERPL-MCNC: <0.2 MG/DL (ref 0–1.2)
BILIRUBIN URINE: NEGATIVE
BLOOD, URINE: NEGATIVE
BUN BLDV-MCNC: 10 MG/DL (ref 6–20)
CALCIUM SERPL-MCNC: 9.2 MG/DL (ref 8.6–10.2)
CHLORIDE BLD-SCNC: 101 MMOL/L (ref 98–107)
CLARITY: CLEAR
CO2: 25 MMOL/L (ref 22–29)
COLOR: YELLOW
CREAT SERPL-MCNC: 0.7 MG/DL (ref 0.5–1)
CREATININE URINE: 123 MG/DL (ref 29–226)
EOSINOPHILS ABSOLUTE: 0.21 E9/L (ref 0.05–0.5)
EOSINOPHILS RELATIVE PERCENT: 2.1 % (ref 0–6)
EPITHELIAL CELLS, UA: ABNORMAL /HPF
FERRITIN: 28 NG/ML
FOLATE: >20 NG/ML (ref 4.8–24.2)
GFR AFRICAN AMERICAN: >60
GFR NON-AFRICAN AMERICAN: >60 ML/MIN/1.73
GLUCOSE BLD-MCNC: 102 MG/DL (ref 74–99)
GLUCOSE URINE: NEGATIVE MG/DL
HBA1C MFR BLD: 4.8 % (ref 4–5.6)
HCT VFR BLD CALC: 35.6 % (ref 34–48)
HEMOGLOBIN: 12.1 G/DL (ref 11.5–15.5)
IMMATURE GRANULOCYTES #: 0.05 E9/L
IMMATURE GRANULOCYTES %: 0.5 % (ref 0–5)
KETONES, URINE: NEGATIVE MG/DL
LEUKOCYTE ESTERASE, URINE: NEGATIVE
LYMPHOCYTES ABSOLUTE: 2.51 E9/L (ref 1.5–4)
LYMPHOCYTES RELATIVE PERCENT: 25.3 % (ref 20–42)
MAGNESIUM: 1.9 MG/DL (ref 1.6–2.6)
MCH RBC QN AUTO: 29.6 PG (ref 26–35)
MCHC RBC AUTO-ENTMCNC: 34 % (ref 32–34.5)
MCV RBC AUTO: 87 FL (ref 80–99.9)
MONOCYTES ABSOLUTE: 0.61 E9/L (ref 0.1–0.95)
MONOCYTES RELATIVE PERCENT: 6.1 % (ref 2–12)
NEUTROPHILS ABSOLUTE: 6.49 E9/L (ref 1.8–7.3)
NEUTROPHILS RELATIVE PERCENT: 65.4 % (ref 43–80)
NITRITE, URINE: NEGATIVE
PDW BLD-RTO: 13.1 FL (ref 11.5–15)
PH UA: 6.5 (ref 5–9)
PLATELET # BLD: 337 E9/L (ref 130–450)
PMV BLD AUTO: 10.2 FL (ref 7–12)
POTASSIUM SERPL-SCNC: 3.8 MMOL/L (ref 3.5–5)
PROTEIN PROTEIN: 9 MG/DL (ref 0–12)
PROTEIN UA: NEGATIVE MG/DL
PROTEIN/CREAT RATIO: 0.1
PROTEIN/CREAT RATIO: 0.1 (ref 0–0.2)
RBC # BLD: 4.09 E12/L (ref 3.5–5.5)
RBC UA: ABNORMAL /HPF (ref 0–2)
SODIUM BLD-SCNC: 136 MMOL/L (ref 132–146)
SPECIFIC GRAVITY UA: 1.02 (ref 1–1.03)
T3 FREE: 3 PG/ML (ref 2–4.4)
T4 FREE: 1.19 NG/DL (ref 0.93–1.7)
TOTAL PROTEIN: 7.2 G/DL (ref 6.4–8.3)
TSH SERPL DL<=0.05 MIU/L-ACNC: 2.9 UIU/ML (ref 0.27–4.2)
UROBILINOGEN, URINE: 0.2 E.U./DL
VITAMIN B-12: 591 PG/ML (ref 211–946)
VITAMIN D 25-HYDROXY: 31 NG/ML (ref 30–100)
WBC # BLD: 9.9 E9/L (ref 4.5–11.5)
WBC UA: ABNORMAL /HPF (ref 0–5)

## 2022-03-30 PROCEDURE — 86747 PARVOVIRUS ANTIBODY: CPT

## 2022-03-30 PROCEDURE — 80053 COMPREHEN METABOLIC PANEL: CPT

## 2022-03-30 PROCEDURE — 82570 ASSAY OF URINE CREATININE: CPT

## 2022-03-30 PROCEDURE — 86645 CMV ANTIBODY IGM: CPT

## 2022-03-30 PROCEDURE — 85025 COMPLETE CBC W/AUTO DIFF WBC: CPT

## 2022-03-30 PROCEDURE — 84156 ASSAY OF PROTEIN URINE: CPT

## 2022-03-30 PROCEDURE — 36415 COLL VENOUS BLD VENIPUNCTURE: CPT

## 2022-03-30 PROCEDURE — 84481 FREE ASSAY (FT-3): CPT

## 2022-03-30 PROCEDURE — 84439 ASSAY OF FREE THYROXINE: CPT

## 2022-03-30 PROCEDURE — 81001 URINALYSIS AUTO W/SCOPE: CPT

## 2022-03-30 PROCEDURE — 82607 VITAMIN B-12: CPT

## 2022-03-30 PROCEDURE — 86376 MICROSOMAL ANTIBODY EACH: CPT

## 2022-03-30 PROCEDURE — 84443 ASSAY THYROID STIM HORMONE: CPT

## 2022-03-30 PROCEDURE — 86644 CMV ANTIBODY: CPT

## 2022-03-30 PROCEDURE — 83735 ASSAY OF MAGNESIUM: CPT

## 2022-03-30 PROCEDURE — 87088 URINE BACTERIA CULTURE: CPT

## 2022-03-30 PROCEDURE — 83036 HEMOGLOBIN GLYCOSYLATED A1C: CPT

## 2022-03-30 PROCEDURE — 82728 ASSAY OF FERRITIN: CPT

## 2022-03-30 PROCEDURE — 82306 VITAMIN D 25 HYDROXY: CPT

## 2022-03-30 PROCEDURE — 86800 THYROGLOBULIN ANTIBODY: CPT

## 2022-03-30 PROCEDURE — 82746 ASSAY OF FOLIC ACID SERUM: CPT

## 2022-03-30 SDOH — HEALTH STABILITY - PHYSICAL HEALTH: OCCUPATIONAL EXPOSURE TO UNSPECIFIED RISK FACTOR: Z57.9

## 2022-03-31 ENCOUNTER — ROUTINE PRENATAL (OUTPATIENT)
Dept: OBGYN | Age: 42
End: 2022-03-31
Payer: COMMERCIAL

## 2022-03-31 VITALS
HEART RATE: 97 BPM | WEIGHT: 163 LBS | SYSTOLIC BLOOD PRESSURE: 129 MMHG | BODY MASS INDEX: 29.81 KG/M2 | DIASTOLIC BLOOD PRESSURE: 73 MMHG

## 2022-03-31 DIAGNOSIS — Z98.891 HISTORY OF CESAREAN DELIVERY: ICD-10-CM

## 2022-03-31 DIAGNOSIS — O09.522 MULTIGRAVIDA OF ADVANCED MATERNAL AGE IN SECOND TRIMESTER: ICD-10-CM

## 2022-03-31 DIAGNOSIS — Z34.92 PRENATAL CARE IN SECOND TRIMESTER: Primary | ICD-10-CM

## 2022-03-31 LAB
GLUCOSE URINE, POC: NEGATIVE
PROTEIN UA: NEGATIVE

## 2022-03-31 PROCEDURE — G8427 DOCREV CUR MEDS BY ELIG CLIN: HCPCS | Performed by: OBSTETRICS & GYNECOLOGY

## 2022-03-31 PROCEDURE — 81002 URINALYSIS NONAUTO W/O SCOPE: CPT | Performed by: OBSTETRICS & GYNECOLOGY

## 2022-03-31 PROCEDURE — 1036F TOBACCO NON-USER: CPT | Performed by: OBSTETRICS & GYNECOLOGY

## 2022-03-31 PROCEDURE — 99213 OFFICE O/P EST LOW 20 MIN: CPT | Performed by: OBSTETRICS & GYNECOLOGY

## 2022-03-31 PROCEDURE — G8484 FLU IMMUNIZE NO ADMIN: HCPCS | Performed by: OBSTETRICS & GYNECOLOGY

## 2022-03-31 PROCEDURE — G8419 CALC BMI OUT NRM PARAM NOF/U: HCPCS | Performed by: OBSTETRICS & GYNECOLOGY

## 2022-03-31 NOTE — PROGRESS NOTES
Terese Jose    Patient present for routine prenatal visit today at 15w6d. Reviewed prenatal labs, normal early glucola. NIPT reviewed. Low risk of trisomies, female gender. Patient does not want to know gender until her gender reveal in . Patient saw [facility]. Next appointment is 22. Patient has some nausea, no vomiting. Tolerates small small snacks. Denies VB, or cramping  Not feeling movement at this time. Reviewed above      All questions and concerns addressed at this time  Return in about 4 weeks (around 2022) for OB visit. Cheri Omer was seen today for routine prenatal visit, nausea and other.     Diagnoses and all orders for this visit:    Prenatal care in second trimester    History of  delivery    Multigravida of advanced maternal age in second trimester         Zakia Haley MD

## 2022-04-01 LAB — URINE CULTURE, ROUTINE: NORMAL

## 2022-04-03 LAB
PARVOVIRUS B19 IGG ANTIBODY: 2.45 IV
PARVOVIRUS B19 IGM ANTIBODY: 0.3 IV

## 2022-04-04 ENCOUNTER — TELEPHONE (OUTPATIENT)
Dept: OBGYN CLINIC | Age: 42
End: 2022-04-04

## 2022-04-06 LAB — CYTOMEGALOVIRUS IGG ANTIBODY: NORMAL

## 2022-04-07 LAB
THYROGLOBULIN ANTIBODY: 14 IU/ML (ref 0–40)
THYROID PEROXIDASE (TPO) ABS: 6.2 IU/ML (ref 0–25)

## 2022-04-09 LAB — MISCELLANEOUS LAB TEST RESULT: NORMAL

## 2022-04-25 ENCOUNTER — TELEPHONE (OUTPATIENT)
Dept: OBGYN | Age: 42
End: 2022-04-25

## 2022-04-25 ENCOUNTER — ROUTINE PRENATAL (OUTPATIENT)
Dept: OBGYN CLINIC | Age: 42
End: 2022-04-25
Payer: COMMERCIAL

## 2022-04-25 ENCOUNTER — ANCILLARY PROCEDURE (OUTPATIENT)
Dept: OBGYN CLINIC | Age: 42
End: 2022-04-25
Payer: COMMERCIAL

## 2022-04-25 VITALS
DIASTOLIC BLOOD PRESSURE: 79 MMHG | WEIGHT: 164 LBS | BODY MASS INDEX: 30 KG/M2 | SYSTOLIC BLOOD PRESSURE: 138 MMHG | HEART RATE: 106 BPM

## 2022-04-25 DIAGNOSIS — O09.521 MULTIGRAVIDA OF ADVANCED MATERNAL AGE IN FIRST TRIMESTER: Primary | ICD-10-CM

## 2022-04-25 DIAGNOSIS — R11.2 NON-INTRACTABLE VOMITING WITH NAUSEA, UNSPECIFIED VOMITING TYPE: ICD-10-CM

## 2022-04-25 DIAGNOSIS — Z86.2 HISTORY OF ANEMIA: ICD-10-CM

## 2022-04-25 DIAGNOSIS — O09.292 HISTORY OF MACROSOMIA IN INFANT IN PRIOR PREGNANCY, CURRENTLY PREGNANT IN SECOND TRIMESTER: ICD-10-CM

## 2022-04-25 DIAGNOSIS — Z3A.19 19 WEEKS GESTATION OF PREGNANCY: ICD-10-CM

## 2022-04-25 DIAGNOSIS — K59.00 CONSTIPATION, UNSPECIFIED CONSTIPATION TYPE: ICD-10-CM

## 2022-04-25 PROBLEM — R11.10 NON-INTRACTABLE VOMITING: Status: ACTIVE | Noted: 2022-04-25

## 2022-04-25 LAB
GLUCOSE URINE, POC: NEGATIVE
PROTEIN UA: NEGATIVE

## 2022-04-25 PROCEDURE — 81002 URINALYSIS NONAUTO W/O SCOPE: CPT | Performed by: OBSTETRICS & GYNECOLOGY

## 2022-04-25 PROCEDURE — G8419 CALC BMI OUT NRM PARAM NOF/U: HCPCS | Performed by: OBSTETRICS & GYNECOLOGY

## 2022-04-25 PROCEDURE — 76817 TRANSVAGINAL US OBSTETRIC: CPT | Performed by: OBSTETRICS & GYNECOLOGY

## 2022-04-25 PROCEDURE — 99213 OFFICE O/P EST LOW 20 MIN: CPT | Performed by: OBSTETRICS & GYNECOLOGY

## 2022-04-25 PROCEDURE — 99214 OFFICE O/P EST MOD 30 MIN: CPT | Performed by: OBSTETRICS & GYNECOLOGY

## 2022-04-25 PROCEDURE — 76811 OB US DETAILED SNGL FETUS: CPT | Performed by: OBSTETRICS & GYNECOLOGY

## 2022-04-25 PROCEDURE — 1036F TOBACCO NON-USER: CPT | Performed by: OBSTETRICS & GYNECOLOGY

## 2022-04-25 PROCEDURE — G8427 DOCREV CUR MEDS BY ELIG CLIN: HCPCS | Performed by: OBSTETRICS & GYNECOLOGY

## 2022-04-25 RX ORDER — ONDANSETRON 4 MG/1
4 TABLET, FILM COATED ORAL EVERY 8 HOURS PRN
Qty: 40 TABLET | Refills: 0 | Status: ON HOLD
Start: 2022-04-25 | End: 2022-09-09 | Stop reason: HOSPADM

## 2022-04-25 RX ORDER — FAMOTIDINE 20 MG/1
20 TABLET, FILM COATED ORAL 2 TIMES DAILY
Qty: 60 TABLET | Refills: 3 | Status: SHIPPED | OUTPATIENT
Start: 2022-04-25

## 2022-04-25 RX ORDER — DOCUSATE SODIUM 100 MG/1
100 CAPSULE, LIQUID FILLED ORAL 2 TIMES DAILY PRN
Qty: 60 CAPSULE | Refills: 2 | Status: SHIPPED | OUTPATIENT
Start: 2022-04-25 | End: 2022-05-25

## 2022-04-25 NOTE — PROGRESS NOTES
2022      Brittni Sunshine MD  Monica Burrell  [place],  [address]     RE:  Bj Boyd  : 1980   AGE: 46 y.o. This report has been created using voice recognition software. It may contain errors which are inherent in voice recognition technology. Dear Dr. Betty Encarnacion:      I had the pleasure of meeting with Ms. Mcfarlane for a return consultation. As you know, Ms. Jean Marie Giles  is a 46 y.o. [id number] at 19w3d ([address]) who is being followed by our office for multiple medical issues. Today, Ms. Mcfarlane reports that she feels well. She notes good fetal movement and denies any symptoms of leaking of fluid, vaginal bleeding, and/or contractions. She had a fetal ultrasound that was notable for the following. There is a single intrauterine gestation in a cephalic presentation with a heart rate of 137 beats per minute. The placenta is anterior. The amniotic fluid index is normal.  The composite gestational age is 18w5d. Transvaginal cervical length 4.8 cm without funneling      PERTINENT PHYSICAL EXAMINATION:   /79   Pulse 106   Wt 164 lb (74.4 kg)   LMP 2021   BMI 30.00 kg/m²     Urine dipstick:   Negative for Glucose    Negative for Albumin      A fetal ultrasound assessment was performed today. A report is enclosed for your review. Assessment & Plan:  46 y.o.  at 19w3d ([address]) with:    1. Pregnancy dating -- The patient's pregnancy dating was again reviewed. The patient reported that she had her Mirena removed on 2021. She was tracking her periods. Per the vickie she was using, she was having 25-day cycles. She reported a last menstrual period of 2021. However, she had 2 periods in December. Her first was on 2021. This would have been a 16-day cycle.     The patient's initial ultrasound was reviewed. Based on her last menstrual period, she was 13 weeks 2 days, SHARA 2022.   Based on the ultrasound, she was 14 weeks 3 days, SHARA 9/16/2022.     There is an 8-day difference between dating by her LMP and the patient's initial ultrasound. Given there was greater than a 7-day discrepancy, the recommendation was made to use the 14-week ultrasound for dating. Thus, her SHARA is 9/16/2022 based on a 14-week ultrasound. The patient returns today for an anatomy ultrasound. She is 19 weeks 3 days by her 14-week ultrasound. The composite gestational age was 22 weeks 5 days. Fetal growth was appropriate for the gestational age based on her 14-week ultrasound.     The patient was scheduled to return in 3 weeks to reevaluate fetal growth.     2. Advanced maternal age  -- The patient was previously counseled regarding the implications of advanced maternal age in pregnancy, specifically that of aneuploidy. Counseling was reviewed. The patient did not have any additional questions or concerns.     The patient previously completed screening with NIPT. Her results were available for review. The fetal fraction was 9% and the results were low risk for aneuploidy. Per the report, the fetus is female. The genetic screening results were reviewed with the patient. The fetal sex was not. The patient declined any additional diagnostic testing.     Since the patient had early screening with NIPT, a maternal serum AFP is recommended between 15 and 22 weeks to screen for open neural tube defects. Testing was ordered today.     Again, recent studies have reported that there may be an increased risk for fetal loss in the setting of advanced maternal age. Thus, increased surveillance is recommended. I recommend monitoring fetal growth serially, every 4 weeks beginning at 24-26 weeks' gestation. She should monitor fetal kick counts daily starting at 28 weeks' gestation. At 36 weeks' gestation, she should be scheduled for a weekly non stress test or biophysical profile.  I recommend delivery at 44 week's gestation.     Given there is an increased risk for hypertensive disorders of pregnancy in the setting of advanced maternal age, the possible utility of a low dose aspirin in reducing her risk for hypertensive disorders of pregnancy was reviewed. The risks and benefits of low dose aspirin were discussed. She was counseled to continue taking a daily low-dose aspirin for the remainder of pregnancy and 6 to 8 weeks postpartum.     3. History of  X3 -- The patient's first pregnancy was delivered via  secondary to a breech presentation. She then had a 2 repeat C-sections. The placenta is anterior and above the lower uterine segment. She plans to have a repeat  for delivery.      4. History of anemia -- The patient reported that her prior pregnancies were complicated by anemia. Secondary to this history, a baseline nutrition panel and thyroid function studies were recommended. Testing was completed on 3/30/2022, her results included: H/H12.1/35.6, MCV 87, platelet count 561,419, potassium 3.8, creatinine 0.7, calcium 9.2, ALT 20, AST 18, ferritin 28, folate >20, vitamin B12 591, vitamin D 31, magnesium 1.9, TSH 2.9, free T4 1.19, free T3 3, TPO negative, antithyroglobulin antibody negative, hemoglobin A1c 4.8%, urinalysis negative, urine culture negative, urine protein creatinine ratio 0.1.  --Additional recommendations are below.     5. History of macrosomia -- The patient's third child weighed 9 pounds 1 ounce at 39 weeks gestation. She had a repeat  for delivery. She denies having a history of gestational diabetes. Because of this history, she is at risk for having another child with macrosomia. Fetal growth should be monitored serially, every 3 to 4 weeks starting at 24 to 26 weeks gestation. A baseline hemoglobin A1c was checked on 3/30/2022. It was normal at 4.8%.     The patient had a baseline Glucola on 3/8/2022 that was normal at 128. She should have repeat screening at 24 to 28 weeks gestation.     6. Work exposures -- The patient previously indicated that she works as an [employer]. She was counseled that she may be at increased risk for viral exposures such as CMV and parvovirus. She was given orders to have baseline screening for these viruses. Baseline screening was completed on 3/30/2022. Her results included: CMV IgG positive/IgM negative, parvovirus IgG positive/IgM negative. The results indicate past exposure to CMV and immunity to parvovirus. The results were reviewed with the patient. Precautions were reviewed.     7. Family history of bleeding disorder -- The patient reported that her nephew (sister's son) has some type of bleeding disorder. She was unsure of the name of the condition. She was encouraged to obtain additional information regarding her nephew's condition. Today, the patient reported that her nephew has an issue with factor V. She was unsure if he has factor V Leiden or factor V deficiency. She states that the condition was inherited from his father. The patient reports that her sister was tested and does not have this condition. The patient was counseled that if her sister does not have this condition or gene, that I would not anticipate any increased risk for the patient or her child. She was encouraged to try to obtain additional information.     8. Family history of thyroid disease -- The patient's family history is notable for thyroid disease in her mother. Given this family history, baseline thyroid function studies and a screening thyroid peroxidase antibody/antithyroglobulin antibody are recommended. The patient completed baseline screening on 3/30/2022, her results included: TSH 2.9, free T4 1.19, free T3 3, TPO negative, antithyroglobulin antibody negative. The results were reviewed with the patient.     9. History of ovarian cyst -- The patient reported a history of an ovarian cyst.  She was unsure which side the cyst was on.   She was supposed to have follow-up with her primary OB/GYN but did not have the follow-up imaging. The ovaries were evaluated again today and appeared normal bilaterally. No cystic structures were seen in the adnexa. The reassuring findings were reviewed with the patient.     10. Vaccination in pregnancy -- The patient was  previously counseled regarding the recommendations for vaccination in pregnancy. Counseling was reviewed. The patient did not have any additional questions or concerns.     The patient was counseled regarding the recommendations for the Tdap vaccination in pregnancy. Risks and benefits were discussed. The vaccination is typically administered between 27 and 36 weeks gestation and recommended to confer protection against whooping cough to the . The patient plans to discuss this with her primary provider at her next visit. The patient was also counseled that her partner in any individuals who will be in close contact with the  should also be vaccinated for whooping cough.     The patient was counseled regarding recommendation for the flu vaccination in pregnancy for both maternal and infant safety. Risks and benefits were reviewed. She was encouraged to have this vaccination as an outpatient. --She received a flu vaccination     Counseling was provided regarding the recommendation for the Covid vaccination in pregnancy. I advised the patient that the [employer] of [employer]stetrics and Gynecology and The Society for Maternal Fetal Medicine recommend that all pregnant women be vaccinated for COVID-19. \"Data have shown that COVID-19 infection puts pregnant people at increased risk of severe complications and even death; yet only about 22% of pregnant individuals have received 1 more doses of COVID-19 vaccine according to the [facility] and the [facility] for Disease Control and Prevention. \"     \" Recent data have shown that more than 95% of those were hospitalized and/or dying from COVID-19 are those who have remained unvaccinated. Pregnant individuals who have decided to wait until after delivery to be vaccinated may be inadvertently exposing themselves to an increased risk of severe illness or death. \"      \" COVID-19 vaccination is the best testing to reduce maternal and fetal complications of LUCTV-61 infection among pregnant people,\" said Jeannie Patel MD, president of the Society for Maternal Fetal Medicine, sub-specialists.     The patient was counseled that in the event she opts not to have the Covid vaccination, she should alert her provider immediately if she test positive for Covid. Pregnant women are on the priority list for treatment with monoclonal antibody. This intervention has been shown to decrease the risk for hospitalization and complications related to Covid in pregnancy. --She received a Covid vaccination     The patient expressed verbal understanding of this counseling. 11.   Nausea and vomiting of pregnancy  -- The patients reports having worsening nausea. She reports occasional emesis. She has gained 1 pound since her last visit. The patient reports daily symptoms of nausea and decreased appetite. She denies any signs of symptoms of dehydration. Precautions were reviewed. The patient was encouraged to eat small amounts of food every 2-3 hours during the day. He was also encouraged to stay well-hydrated. A repeat nutrition panel (CBC, CMP, magnesium, ferritin, folate, vitamin B12, vitamin D 25 [place]) was recommended. This was ordered today. She was provided with an order for Pepcid, 20 mg twice daily, and Zofran, 4 mg every 8 hours as needed for nausea. She was counseled to call and/or return with worsening symptoms. 12. Constipation -- The patient had complaints of constipation today. She was counseled to stay well hydrated and increase fiber, fruit, and vegetable intake. Increased fiber intake was encouraged. She can use colace daily.  A prescription was provided. She can use a gentle laxative as needed. She can also use a probiotic as needed. Precautions were reviewed. 13.  Dolichocephaly -- Today's ultrasound was reviewed with the patient. There is dolichocephaly in that the cephalic index is 03% (normal >75%). The patient was counseled that there is concern for dolichocephaly given the cephalic index is <55%. This generally indicates that at this time, the fetal head is longer than it is wide. The fetal head shape varies during gestation and can be affected by factors such as presentation (more common with breech presentation), the amniotic fluid level, and having a multiple gestation. Additionally, it can be a normal variant (if mild). If the condition persists, it may be suggestive of a genetic condition or syndrome and/or craniosynostosis. The fetal head measurements will be reassessed at her follow up ultrasound. --I requested the patient return for a follow-up assessment in 3 weeks unless there is a clinical reason for her to return prior to that time. She is to call if she has any problems or questions prior to her next visit. Further evaluation and management will be dependent on her clinical presentation and the results of her testing. --The patient was advised to call if she has any increased vaginal discharge, vaginal bleeding, contractions, abdominal pain, back pain or any new significant symptomatology prior to her next visit. I advised her that these are signs and symptoms of cervical change and require follow-up assessment when they occur. Preeclampsia precautions were also reviewed with the patient. --The patient was also counseled to call and/or return with any concerns for decreased fetal activity. --The patient is to continue to follow with you in your office for ongoing obstetric care. --The total time spent on today's visit was 30 minutes.   This included preparation for the visit (i.e. reviewing prior external notes and test results), performance of a medically appropriate history and examination, counseling, orders for medications, tests or other procedures, and coordination of care. Greater than 50% of the time was spent face-to-face with the patient. This time is exclusive of procedures performed. I answered all of  the patient's questions to her satisfaction. I asked her to call if she had any additional questions prior to her next visit. --At the conclusion of the visit, the patient appeared to have a good understanding of the issues discussed. I answered all of her questions to her satisfaction. I asked her to call if she had any additional questions prior to her next visit. --Thank you for allowing me to participate in the care of this pleasant patient. Please don't hesitate to call me if you have any questions. Sincerely,      Nallely Guerrero MD, [facility] ([place], [place]) [phone]  [phone]      *All or parts of this note may have been generated using a voice recognition program. There may be typo, grammar, or Word substitution errors that have escaped my review of this note.

## 2022-04-25 NOTE — TELEPHONE ENCOUNTER
CHW reached out to Pt. Today to introduce [employer]. Chw left a brief message with [employer] contact numbers. CHw will follow up with Pt. On her next appointment on 4/28/2022 @ 1:45.

## 2022-04-25 NOTE — LETTER
[facility] Maternal Fetal Medicine  [address]  [facility] ([facility] [address]  Phone: [phone]  Fax: [phone]           Nallely Guerrero MD      2022     Patient: Colby Stallworth   MR Number: [id number]   YOB: 1980   Date of Visit: 2022       Dear Dr. Yisel Marsh:    Thank you for referring Colby Stallworth to me for evaluation/treatment. Below are the relevant portions of my assessment and plan of care. If you have questions, please do not hesitate to call me. I look forward to following Hollie Sahu along with you. Sincerely,        Nallely Guerrero MD    CC providers:  Harvey Juarez MD  [address] [facility]  [address] [place]       2022      Harvey Juarez, [address] [facility],  [address]     RE:  Maureen Sequeira  : 1980   AGE: 46 y.o. This report has been created using voice recognition software. It may contain errors which are inherent in voice recognition technology. Dear Dr. Yisel Marsh:      I had the pleasure of meeting with Ms. Mcfarlane for a return consultation. As you know, Ms. Viet Odonnell  is a 46 y.o. [id number] at 19w3d ([address]) who is being followed by our office for multiple medical issues. Today, Ms. Mcfarlane reports that she feels well. She notes good fetal movement and denies any symptoms of leaking of fluid, vaginal bleeding, and/or contractions. She had a fetal ultrasound that was notable for the following. There is a single intrauterine gestation in a cephalic presentation with a heart rate of 137 beats per minute. The placenta is anterior. The amniotic fluid index is normal.  The composite gestational age is 18w5d.   Transvaginal cervical length 4.8 cm without funneling      PERTINENT PHYSICAL EXAMINATION:   /79   Pulse 106   Wt 164 lb (74.4 kg)   LMP 2021   BMI 30.00 kg/m²     Urine dipstick:   Negative for Glucose    Negative for Albumin      A fetal ultrasound assessment was performed today. A report is enclosed for your review. Assessment & Plan:  46 y.o.  at 19w3d ([address]) with:    1. Pregnancy dating -- The patient's pregnancy dating was again reviewed. The patient reported that she had her Mirena removed on 2021. She was tracking her periods. Per the vickie she was using, she was having 25-day cycles. She reported a last menstrual period of 2021. However, she had 2 periods in December. Her first was on 2021. This would have been a 16-day cycle.     The patient's initial ultrasound was reviewed. Based on her last menstrual period, she was 13 weeks 2 days, SHARA 2022. Based on the ultrasound, she was 14 weeks 3 days, SHARA 2022.     There is an 8-day difference between dating by her LMP and the patient's initial ultrasound. Given there was greater than a 7-day discrepancy, the recommendation was made to use the 14-week ultrasound for dating. Thus, her SHARA is 2022 based on a 14-week ultrasound. The patient returns today for an anatomy ultrasound. She is 19 weeks 3 days by her 14-week ultrasound. The composite gestational age was 22 weeks 5 days. Fetal growth was appropriate for the gestational age based on her 14-week ultrasound.     The patient was scheduled to return in 3 weeks to reevaluate fetal growth.     2. Advanced maternal age  -- The patient was previously counseled regarding the implications of advanced maternal age in pregnancy, specifically that of aneuploidy. Counseling was reviewed. The patient did not have any additional questions or concerns.     The patient previously completed screening with NIPT. Her results were available for review. The fetal fraction was 9% and the results were low risk for aneuploidy. Per the report, the fetus is female. The genetic screening results were reviewed with the patient. The fetal sex was not.   The patient declined any additional diagnostic urinalysis negative, urine culture negative, urine protein creatinine ratio 0.1.  --Additional recommendations are below.     5. History of macrosomia -- The patient's third child weighed 9 pounds 1 ounce at 39 weeks gestation. She had a repeat  for delivery. She denies having a history of gestational diabetes. Because of this history, she is at risk for having another child with macrosomia. Fetal growth should be monitored serially, every 3 to 4 weeks starting at 24 to 26 weeks gestation. A baseline hemoglobin A1c was checked on 3/30/2022. It was normal at 4.8%.     The patient had a baseline Glucola on 3/8/2022 that was normal at 128. She should have repeat screening at 24 to 28 weeks gestation.     6. Work exposures -- The patient previously indicated that she works as an [employer]. She was counseled that she may be at increased risk for viral exposures such as CMV and parvovirus. She was given orders to have baseline screening for these viruses. Baseline screening was completed on 3/30/2022. Her results included: CMV IgG positive/IgM negative, parvovirus IgG positive/IgM negative. The results indicate past exposure to CMV and immunity to parvovirus. The results were reviewed with the patient. Precautions were reviewed.     7. Family history of bleeding disorder -- The patient reported that her nephew (sister's son) has some type of bleeding disorder. She was unsure of the name of the condition. She was encouraged to obtain additional information regarding her nephew's condition. Today, the patient reported that her nephew has an issue with factor V. She was unsure if he has factor V Leiden or factor V deficiency. She states that the condition was inherited from his father. The patient reports that her sister was tested and does not have this condition.     The patient was counseled that if her sister does not have this condition or gene, that I would not anticipate any increased risk for the patient or her child. She was encouraged to try to obtain additional information.     8. Family history of thyroid disease -- The patient's family history is notable for thyroid disease in her mother. Given this family history, baseline thyroid function studies and a screening thyroid peroxidase antibody/antithyroglobulin antibody are recommended. The patient completed baseline screening on 3/30/2022, her results included: TSH 2.9, free T4 1.19, free T3 3, TPO negative, antithyroglobulin antibody negative. The results were reviewed with the patient.     9. History of ovarian cyst -- The patient reported a history of an ovarian cyst.  She was unsure which side the cyst was on. She was supposed to have follow-up with her primary OB/GYN but did not have the follow-up imaging. The ovaries were evaluated again today and appeared normal bilaterally. No cystic structures were seen in the adnexa. The reassuring findings were reviewed with the patient.     10. Vaccination in pregnancy -- The patient was  previously counseled regarding the recommendations for vaccination in pregnancy. Counseling was reviewed. The patient did not have any additional questions or concerns.     The patient was counseled regarding the recommendations for the Tdap vaccination in pregnancy. Risks and benefits were discussed. The vaccination is typically administered between 27 and 36 weeks gestation and recommended to confer protection against whooping cough to the . The patient plans to discuss this with her primary provider at her next visit. The patient was also counseled that her partner in any individuals who will be in close contact with the  should also be vaccinated for whooping cough.     The patient was counseled regarding recommendation for the flu vaccination in pregnancy for both maternal and infant safety. Risks and benefits were reviewed.   She was encouraged to have this vaccination as an outpatient. --She received a flu vaccination     Counseling was provided regarding the recommendation for the Covid vaccination in pregnancy. I advised the patient that the [employer] of Obstetrics and Gynecology and The Society for Maternal Fetal Medicine recommend that all pregnant women be vaccinated for COVID-19. \"Data have shown that COVID-19 infection puts pregnant people at increased risk of severe complications and even death; yet only about 22% of pregnant individuals have received 1 more doses of COVID-19 vaccine according to the [employer] for Disease Control and Prevention. \"     \" Recent data have shown that more than 95% of those were hospitalized and/or dying from COVID-19 are those who have remained unvaccinated. Pregnant individuals who have decided to wait until after delivery to be vaccinated may be inadvertently exposing themselves to an increased risk of severe illness or death. \"      \" COVID-19 vaccination is the best testing to reduce maternal and fetal complications of DLSDY-78 infection among pregnant people,\" said Lorenzo Valerio MD, president of the Society for Maternal Fetal Medicine, sub-specialists.     The patient was counseled that in the event she opts not to have the Covid vaccination, she should alert her provider immediately if she test positive for Covid. Pregnant women are on the priority list for treatment with monoclonal antibody. This intervention has been shown to decrease the risk for hospitalization and complications related to Covid in pregnancy. --She received a Covid vaccination     The patient expressed verbal understanding of this counseling. 11.   Nausea and vomiting of pregnancy  -- The patients reports having worsening nausea. She reports occasional emesis. She has gained 1 pound since her last visit. The patient reports daily symptoms of nausea and decreased appetite. She denies any signs of symptoms of dehydration. Precautions were reviewed. The patient was encouraged to eat small amounts of food every 2-3 hours during the day. He was also encouraged to stay well-hydrated. A repeat nutrition panel (CBC, CMP, magnesium, ferritin, folate, vitamin B12, vitamin D 25 [place]) was recommended. This was ordered today. She was provided with an order for Pepcid, 20 mg twice daily, and Zofran, 4 mg every 8 hours as needed for nausea. She was counseled to call and/or return with worsening symptoms. 12. Constipation -- The patient had complaints of constipation today. She was counseled to stay well hydrated and increase fiber, fruit, and vegetable intake. Increased fiber intake was encouraged. She can use colace daily. A prescription was provided. She can use a gentle laxative as needed. She can also use a probiotic as needed. Precautions were reviewed. 13.  Dolichocephaly -- Today's ultrasound was reviewed with the patient. There is dolichocephaly in that the cephalic index is 28% (normal >75%). The patient was counseled that there is concern for dolichocephaly given the cephalic index is <57%. This generally indicates that at this time, the fetal head is longer than it is wide. The fetal head shape varies during gestation and can be affected by factors such as presentation (more common with breech presentation), the amniotic fluid level, and having a multiple gestation. Additionally, it can be a normal variant (if mild). If the condition persists, it may be suggestive of a genetic condition or syndrome and/or craniosynostosis. The fetal head measurements will be reassessed at her follow up ultrasound. --I requested the patient return for a follow-up assessment in 3 weeks unless there is a clinical reason for her to return prior to that time. She is to call if she has any problems or questions prior to her next visit.  Further evaluation and management will be dependent on her clinical presentation and the results of her testing. --The patient was advised to call if she has any increased vaginal discharge, vaginal bleeding, contractions, abdominal pain, back pain or any new significant symptomatology prior to her next visit. I advised her that these are signs and symptoms of cervical change and require follow-up assessment when they occur. Preeclampsia precautions were also reviewed with the patient. --The patient was also counseled to call and/or return with any concerns for decreased fetal activity. --The patient is to continue to follow with you in your office for ongoing obstetric care. --The total time spent on today's visit was 30 minutes. This included preparation for the visit (i.e. reviewing prior external notes and test results), performance of a medically appropriate history and examination, counseling, orders for medications, tests or other procedures, and coordination of care. Greater than 50% of the time was spent face-to-face with the patient. This time is exclusive of procedures performed. I answered all of  the patient's questions to her satisfaction. I asked her to call if she had any additional questions prior to her next visit. --At the conclusion of the visit, the patient appeared to have a good understanding of the issues discussed. I answered all of her questions to her satisfaction. I asked her to call if she had any additional questions prior to her next visit. --Thank you for allowing me to participate in the care of this pleasant patient. Please don't hesitate to call me if you have any questions. Sincerely,      Semaj Quiroga MD, [address]  [phone]      *All or parts of this note may have been generated using a voice recognition program. There may be typo, grammar, or Word substitution errors that have escaped my review of this note.

## 2022-04-25 NOTE — PATIENT INSTRUCTIONS
Patient Education        Weeks 18 to 22 of Your Pregnancy: Care Instructions  Overview     Your baby is continuing to develop quickly. Sometime between 18 and 22 weeks, you'll probably start to feel your baby move. At first, these small fetal movements feel like fluttering or \"butterflies. \" Or they may feel like gas bubbles. As your baby grows, these movements will become stronger. You may also notice that your baby hiccups. Babies at this stage cannow suck their thumbs. You may find that your nausea and fatigue are gone. You may feel better overall and have more energy than you did in your first trimester. But you might now also have some new discomforts, like sleep problems or leg cramps. Talk to yourdoctor about things you can do at home to ease these problems. Follow-up care is a key part of your treatment and safety. Be sure to make and go to all appointments, and call your doctor if you are having problems. It's also a good idea to know your test results and keep alist of the medicines you take. How can you care for yourself at home? Ease sleep problems   Avoid caffeine in drinks or chocolate late in the day.  Get some exercise every day.  Take a warm shower or bath before bed.  Have a light snack or glass of milk at bedtime.  Do relaxation exercises in bed to calm your mind and body.  Support your legs and back with extra pillows. Try a pillow between your legs if you sleep on your side.  Do not use sleeping pills or alcohol. They could harm your baby. Ease leg cramps   Do not massage your calf during the cramp.  Sit on a firm bed or chair. Straighten your leg, and bend your foot (flex your ankle) slowly upward, toward your knee. Bend your toes up and down.  Stand on a cool, flat surface. Stretch your toes upward, and take small steps walking on your heels.  Use a heating pad or hot water bottle to help with muscle ache. Prevent leg cramps   Be sure to get enough calcium.  If you are worried that you are not getting enough, talk to your doctor.  Exercise every day, and stretch your legs before bed.  Take a warm bath before bed, and try leg warmers at night. Where can you learn more? Go to https://julian.healthBig Health. org and sign in to your [employer] account. Enter F514 in the [facility] box to learn more about \"Weeks 18 to 22 of Your Pregnancy: Care Instructions. \"     If you do not have an account, please click on the \"Sign Up Now\" link. Current as of: June 16, 2021               Content Version: 13.2  © 2006-2022 [employer]. Care instructions adapted under license by [facility] [place] ([facility]). If you have questions about a medical condition or this instruction, always ask your healthcare professional. [place] [address] any warranty or liability for your use of this information. Patient Education        Learning About When to Call Your Doctor During Pregnancy (After 20 Weeks)  Overview  It's common to have concerns about what might be a problem when you're pregnant. Most pregnancies don't have any serious problems. But it's still important to know when to call your doctor if you have certain symptoms orsigns of labor. These are general suggestions. Your doctor may give you some more informationabout when to call. When to call your doctor (after 20 weeks)  Call 911 anytime you think you may need emergency care. For example, call if:   You have severe vaginal bleeding.  You have sudden, severe pain in your belly.  You passed out (lost consciousness).  You have a seizure.  You see or feel the umbilical cord.  You think you are about to deliver your baby and can't make it safely to the hospital.  Call your doctor now or seek immediate medical care if:   You have vaginal bleeding.  You have belly pain.  You have a fever.  You have symptoms of preeclampsia, such as:  ? Sudden swelling of your face, hands, or feet.   ? New vision problems (such as dimness, blurring, or seeing spots). ? A severe headache.  You have a sudden release of fluid from your vagina. (You think your water broke.)   You think that you may be in labor. This means that you've had at least 6 contractions in an hour.  You notice that your baby has stopped moving or is moving much less than normal.   You have symptoms of a urinary tract infection. These may include:  ? Pain or burning when you urinate. ? A frequent need to urinate without being able to pass much urine. ? Pain in the flank, which is just below the rib cage and above the waist on either side of the back. ? Blood in your urine. Watch closely for changes in your health, and be sure to contact your doctor if:   You have vaginal discharge that smells bad.  You have skin changes, such as:  ? A rash. ? Itching. ? Yellow color to your skin.  You have other concerns about your pregnancy. If you have labor signs at 37 weeks or more  If you have signs of labor at 37 weeks or more, your doctor may tell you tocall when your labor becomes more active. Symptoms of active labor include:   Contractions that are regular.  Contractions that are less than 5 minutes apart.  Contractions that are hard to talk through. Follow-up care is a key part of your treatment and safety. Be sure to make and go to all appointments, and call your doctor if you are having problems. It's also a good idea to know your test results and keep alist of the medicines you take. Where can you learn more? Go to https://julian.healthavandeo. org and sign in to your [employer] account. Enter  in the hc1.com box to learn more about \"Learning About When to Call Your Doctor During Pregnancy (After 20 Weeks). \"     If you do not have an account, please click on the \"Sign Up Now\" link. Current as of: June 16, 2021               Content Version: 13.2  © 7625-8982 Healthwise, [facility].    Care instructions adapted under license by [facility] [place] ([facility]). If you have questions about a medical condition or this instruction, always ask your healthcare professional. [address] any warranty or liability for your use of this information. Please arrive for your scheduled appointment at least 15 minutes early with your actual insurance card+ a photo ID. Also if you need any refills ordered or have questions, it may take up 48 hours to reply. Please allow ample time for your refills. Call me when you use last refill. Thank you for your cooperation. Call your primary obstetrician with bleeding, leaking of fluid, abdominal tenderness, headache, blurry vision, epigastric pain and increased urinary frequency. If you are experiencing an emergency and need immediate help, call 911 or go to go emergency room or labor and delivery. if you are sick, not feeling well or have an infectious process going on please reschedule your appointment by calling [phone]. Also if any family members are not feeling well, please do not bring them to your appointment. We appreciate your cooperation. We are doing this in order to protect our pregnant mothers+ their babies. if you are sick, not feeling well or have an infectious process going on please reschedule your appointment by calling [phone]. Also if any family members are not feeling well, please do not bring them to your appointment. We appreciate your cooperation. We are doing this in order to protect our pregnant mothers+ their babies.

## 2022-04-25 NOTE — PROGRESS NOTES
Pt here for pregnancy ultrasound  Pt states feeling baby movement  Pt c/o nausea all the time and difficulty eating

## 2022-04-28 ENCOUNTER — ROUTINE PRENATAL (OUTPATIENT)
Dept: OBGYN | Age: 42
End: 2022-04-28
Payer: COMMERCIAL

## 2022-04-28 VITALS
HEART RATE: 87 BPM | WEIGHT: 166 LBS | DIASTOLIC BLOOD PRESSURE: 61 MMHG | BODY MASS INDEX: 30.36 KG/M2 | SYSTOLIC BLOOD PRESSURE: 140 MMHG

## 2022-04-28 DIAGNOSIS — Z98.891 HISTORY OF CESAREAN DELIVERY: ICD-10-CM

## 2022-04-28 DIAGNOSIS — O09.522 MULTIGRAVIDA OF ADVANCED MATERNAL AGE IN SECOND TRIMESTER: ICD-10-CM

## 2022-04-28 DIAGNOSIS — O16.2 ELEVATED BLOOD PRESSURE AFFECTING PREGNANCY IN SECOND TRIMESTER, ANTEPARTUM: ICD-10-CM

## 2022-04-28 DIAGNOSIS — O09.292 HISTORY OF MACROSOMIA IN INFANT IN PRIOR PREGNANCY, CURRENTLY PREGNANT IN SECOND TRIMESTER: ICD-10-CM

## 2022-04-28 DIAGNOSIS — Z34.92 PRENATAL CARE IN SECOND TRIMESTER: Primary | ICD-10-CM

## 2022-04-28 LAB
GLUCOSE URINE, POC: NEGATIVE
PROTEIN UA: NEGATIVE

## 2022-04-28 PROCEDURE — 81002 URINALYSIS NONAUTO W/O SCOPE: CPT | Performed by: OBSTETRICS & GYNECOLOGY

## 2022-04-28 PROCEDURE — 1036F TOBACCO NON-USER: CPT | Performed by: OBSTETRICS & GYNECOLOGY

## 2022-04-28 PROCEDURE — G8427 DOCREV CUR MEDS BY ELIG CLIN: HCPCS | Performed by: OBSTETRICS & GYNECOLOGY

## 2022-04-28 PROCEDURE — 99213 OFFICE O/P EST LOW 20 MIN: CPT | Performed by: OBSTETRICS & GYNECOLOGY

## 2022-04-28 PROCEDURE — G8419 CALC BMI OUT NRM PARAM NOF/U: HCPCS | Performed by: OBSTETRICS & GYNECOLOGY

## 2022-04-28 NOTE — PROGRESS NOTES
Lavonne Blum    Patient present for routine prenatal visit today at 19w6d. Patient saw M 22. AFP ordered. Dolichocephaly noted, will continued to be monitored on serial ultrasounds. Advised patient to have labs drawn that were ordered by [facility]. Patient has elevated BP. Denies symptoms. Patient is on a baby ASA. Will monitor. Zofran is helping with nausea. Denies complaints  Denies VB, or cramping  Not feeling movement at this time. Reviewed above        All questions and concerns addressed at this time  Return in about 4 weeks (around 2022) for OB visit. Cristofer Shaw was seen today for routine prenatal visit.     Diagnoses and all orders for this visit:    Prenatal care in second trimester    History of  delivery    Multigravida of advanced maternal age in second trimester    BMI 30.0-30.9,adult    History of macrosomia in infant in prior pregnancy, currently pregnant in second trimester    Elevated blood pressure affecting pregnancy in second trimester, antepartum         Geremias Bacon MD

## 2022-05-16 ENCOUNTER — HOSPITAL ENCOUNTER (OUTPATIENT)
Age: 42
Discharge: HOME | End: 2022-05-16
Payer: COMMERCIAL

## 2022-05-16 DIAGNOSIS — R11.2 NON-INTRACTABLE VOMITING WITH NAUSEA, UNSPECIFIED VOMITING TYPE: ICD-10-CM

## 2022-05-16 DIAGNOSIS — Z86.2 HISTORY OF ANEMIA: ICD-10-CM

## 2022-05-16 DIAGNOSIS — O09.292 HISTORY OF MACROSOMIA IN INFANT IN PRIOR PREGNANCY, CURRENTLY PREGNANT IN SECOND TRIMESTER: ICD-10-CM

## 2022-05-16 DIAGNOSIS — Z3A.19 19 WEEKS GESTATION OF PREGNANCY: ICD-10-CM

## 2022-05-16 DIAGNOSIS — O09.521 MULTIGRAVIDA OF ADVANCED MATERNAL AGE IN FIRST TRIMESTER: ICD-10-CM

## 2022-05-16 PROCEDURE — 82607 VITAMIN B-12: CPT

## 2022-05-16 PROCEDURE — 86376 MICROSOMAL ANTIBODY EACH: CPT

## 2022-05-16 PROCEDURE — 83036 HEMOGLOBIN GLYCOSYLATED A1C: CPT

## 2022-05-16 PROCEDURE — 84156 ASSAY OF PROTEIN URINE: CPT

## 2022-05-16 PROCEDURE — 82105 ALPHA-FETOPROTEIN SERUM: CPT

## 2022-05-16 PROCEDURE — 85025 COMPLETE CBC W/AUTO DIFF WBC: CPT

## 2022-05-16 PROCEDURE — 82746 ASSAY OF FOLIC ACID SERUM: CPT

## 2022-05-16 PROCEDURE — 36415 COLL VENOUS BLD VENIPUNCTURE: CPT

## 2022-05-16 PROCEDURE — 82570 ASSAY OF URINE CREATININE: CPT

## 2022-05-17 ENCOUNTER — ROUTINE PRENATAL (OUTPATIENT)
Dept: OBGYN CLINIC | Age: 42
End: 2022-05-17
Payer: COMMERCIAL

## 2022-05-17 ENCOUNTER — ANCILLARY PROCEDURE (OUTPATIENT)
Dept: OBGYN CLINIC | Age: 42
End: 2022-05-17
Payer: COMMERCIAL

## 2022-05-17 ENCOUNTER — HOSPITAL ENCOUNTER (OUTPATIENT)
Age: 42
Discharge: HOME | End: 2022-05-17
Payer: COMMERCIAL

## 2022-05-17 VITALS
SYSTOLIC BLOOD PRESSURE: 123 MMHG | DIASTOLIC BLOOD PRESSURE: 69 MMHG | WEIGHT: 168 LBS | HEART RATE: 74 BPM | BODY MASS INDEX: 30.73 KG/M2

## 2022-05-17 DIAGNOSIS — Z86.2 HISTORY OF ANEMIA: ICD-10-CM

## 2022-05-17 DIAGNOSIS — O09.521 MULTIGRAVIDA OF ADVANCED MATERNAL AGE IN FIRST TRIMESTER: Primary | ICD-10-CM

## 2022-05-17 DIAGNOSIS — K59.00 CONSTIPATION, UNSPECIFIED CONSTIPATION TYPE: ICD-10-CM

## 2022-05-17 DIAGNOSIS — O09.292 HISTORY OF MACROSOMIA IN INFANT IN PRIOR PREGNANCY, CURRENTLY PREGNANT IN SECOND TRIMESTER: ICD-10-CM

## 2022-05-17 DIAGNOSIS — R11.2 NON-INTRACTABLE VOMITING WITH NAUSEA, UNSPECIFIED VOMITING TYPE: ICD-10-CM

## 2022-05-17 LAB
ALBUMIN SERPL-MCNC: 3.7 G/DL (ref 3.5–5.2)
ALP BLD-CCNC: 82 U/L (ref 35–104)
ALT SERPL-CCNC: 15 U/L (ref 0–32)
ANION GAP SERPL CALCULATED.3IONS-SCNC: 10 MMOL/L (ref 7–16)
AST SERPL-CCNC: 16 U/L (ref 0–31)
BASOPHILS ABSOLUTE: 0.07 E9/L (ref 0–0.2)
BASOPHILS RELATIVE PERCENT: 0.5 % (ref 0–2)
BILIRUB SERPL-MCNC: 0.2 MG/DL (ref 0–1.2)
BILIRUBIN URINE: NEGATIVE
BLOOD, URINE: NEGATIVE
BUN BLDV-MCNC: 9 MG/DL (ref 6–20)
CALCIUM SERPL-MCNC: 9.4 MG/DL (ref 8.6–10.2)
CHLORIDE BLD-SCNC: 102 MMOL/L (ref 98–107)
CLARITY: CLEAR
CO2: 26 MMOL/L (ref 22–29)
COLOR: YELLOW
CREAT SERPL-MCNC: 0.7 MG/DL (ref 0.5–1)
CREATININE URINE: 70 MG/DL (ref 29–226)
EOSINOPHILS ABSOLUTE: 0.21 E9/L (ref 0.05–0.5)
EOSINOPHILS RELATIVE PERCENT: 1.6 % (ref 0–6)
FERRITIN: 21 NG/ML
FOLATE: 18.7 NG/ML (ref 4.8–24.2)
GFR AFRICAN AMERICAN: >60
GFR NON-AFRICAN AMERICAN: >60 ML/MIN/1.73
GLUCOSE BLD-MCNC: 91 MG/DL (ref 74–99)
GLUCOSE URINE, POC: NEGATIVE
GLUCOSE URINE: NEGATIVE MG/DL
HBA1C MFR BLD: 4.7 % (ref 4–5.6)
HCT VFR BLD CALC: 36.5 % (ref 34–48)
HEMOGLOBIN: 11.6 G/DL (ref 11.5–15.5)
IMMATURE GRANULOCYTES #: 0.16 E9/L
IMMATURE GRANULOCYTES %: 1.2 % (ref 0–5)
KETONES, URINE: NEGATIVE MG/DL
LACTATE DEHYDROGENASE: 152 U/L (ref 135–214)
LEUKOCYTE ESTERASE, URINE: NEGATIVE
LYMPHOCYTES ABSOLUTE: 2.37 E9/L (ref 1.5–4)
LYMPHOCYTES RELATIVE PERCENT: 18.1 % (ref 20–42)
MAGNESIUM: 1.9 MG/DL (ref 1.6–2.6)
MCH RBC QN AUTO: 29.6 PG (ref 26–35)
MCHC RBC AUTO-ENTMCNC: 31.8 % (ref 32–34.5)
MCV RBC AUTO: 93.1 FL (ref 80–99.9)
MONOCYTES ABSOLUTE: 0.73 E9/L (ref 0.1–0.95)
MONOCYTES RELATIVE PERCENT: 5.6 % (ref 2–12)
NEUTROPHILS ABSOLUTE: 9.59 E9/L (ref 1.8–7.3)
NEUTROPHILS RELATIVE PERCENT: 73 % (ref 43–80)
NITRITE, URINE: NEGATIVE
PDW BLD-RTO: 13.5 FL (ref 11.5–15)
PH UA: 6.5 (ref 5–9)
PLATELET # BLD: 338 E9/L (ref 130–450)
PMV BLD AUTO: 10.3 FL (ref 7–12)
POTASSIUM SERPL-SCNC: 4.3 MMOL/L (ref 3.5–5)
PROTEIN PROTEIN: 6 MG/DL (ref 0–12)
PROTEIN UA: NEGATIVE
PROTEIN UA: NEGATIVE MG/DL
PROTEIN/CREAT RATIO: 0.1
PROTEIN/CREAT RATIO: 0.1 (ref 0–0.2)
RBC # BLD: 3.92 E12/L (ref 3.5–5.5)
SODIUM BLD-SCNC: 138 MMOL/L (ref 132–146)
SPECIFIC GRAVITY UA: 1.02 (ref 1–1.03)
T3 FREE: 2.5 PG/ML (ref 2–4.4)
T4 FREE: 1.04 NG/DL (ref 0.93–1.7)
TOTAL PROTEIN: 7.1 G/DL (ref 6.4–8.3)
TSH SERPL DL<=0.05 MIU/L-ACNC: 2.97 UIU/ML (ref 0.27–4.2)
URIC ACID, SERUM: 3.5 MG/DL (ref 2.4–5.7)
UROBILINOGEN, URINE: 0.2 E.U./DL
VITAMIN B-12: 464 PG/ML (ref 211–946)
VITAMIN D 25-HYDROXY: 30 NG/ML (ref 30–100)
WBC # BLD: 13.1 E9/L (ref 4.5–11.5)

## 2022-05-17 PROCEDURE — 84481 FREE ASSAY (FT-3): CPT

## 2022-05-17 PROCEDURE — 84550 ASSAY OF BLOOD/URIC ACID: CPT

## 2022-05-17 PROCEDURE — G8419 CALC BMI OUT NRM PARAM NOF/U: HCPCS | Performed by: OBSTETRICS & GYNECOLOGY

## 2022-05-17 PROCEDURE — 76817 TRANSVAGINAL US OBSTETRIC: CPT | Performed by: OBSTETRICS & GYNECOLOGY

## 2022-05-17 PROCEDURE — 81003 URINALYSIS AUTO W/O SCOPE: CPT

## 2022-05-17 PROCEDURE — 36415 COLL VENOUS BLD VENIPUNCTURE: CPT

## 2022-05-17 PROCEDURE — 83615 LACTATE (LD) (LDH) ENZYME: CPT

## 2022-05-17 PROCEDURE — 83735 ASSAY OF MAGNESIUM: CPT

## 2022-05-17 PROCEDURE — 82105 ALPHA-FETOPROTEIN SERUM: CPT

## 2022-05-17 PROCEDURE — 80053 COMPREHEN METABOLIC PANEL: CPT

## 2022-05-17 PROCEDURE — 87088 URINE BACTERIA CULTURE: CPT

## 2022-05-17 PROCEDURE — 82728 ASSAY OF FERRITIN: CPT

## 2022-05-17 PROCEDURE — 1036F TOBACCO NON-USER: CPT | Performed by: OBSTETRICS & GYNECOLOGY

## 2022-05-17 PROCEDURE — 86376 MICROSOMAL ANTIBODY EACH: CPT

## 2022-05-17 PROCEDURE — 84439 ASSAY OF FREE THYROXINE: CPT

## 2022-05-17 PROCEDURE — 99213 OFFICE O/P EST LOW 20 MIN: CPT | Performed by: OBSTETRICS & GYNECOLOGY

## 2022-05-17 PROCEDURE — 84443 ASSAY THYROID STIM HORMONE: CPT

## 2022-05-17 PROCEDURE — 99214 OFFICE O/P EST MOD 30 MIN: CPT | Performed by: OBSTETRICS & GYNECOLOGY

## 2022-05-17 PROCEDURE — 76816 OB US FOLLOW-UP PER FETUS: CPT | Performed by: OBSTETRICS & GYNECOLOGY

## 2022-05-17 PROCEDURE — G8427 DOCREV CUR MEDS BY ELIG CLIN: HCPCS | Performed by: OBSTETRICS & GYNECOLOGY

## 2022-05-17 PROCEDURE — 82306 VITAMIN D 25 HYDROXY: CPT

## 2022-05-17 NOTE — PATIENT INSTRUCTIONS
Patient Education        Weeks 22 to 26 of Your Pregnancy: Care Instructions  Overview     As you enter your 7th month of pregnancy at week 26, your baby's lungs are growing stronger and getting ready to breathe. You may notice that your baby responds to the sound of your voice. You may also notice that your baby does less turning and twisting and more squirming, kicking, or jerking. Jerking often means that your baby has hiccups. Hiccups are normal and are onlytemporary. You may want to think about attending a childbirth preparation class. This is also a good time to start thinking about whether you want to have pain medicineduring labor. You may be tested for gestational diabetes between weeks 25 and 28. Gestational diabetes occurs when your blood sugar level gets too high when you're pregnant. The test is important, because you can have gestational diabetes and not knowit. But the condition can cause problems for your baby. Follow-up care is a key part of your treatment and safety. Be sure to make and go to all appointments, and call your doctor if you are having problems. It's also a good idea to know your test results and keep alist of the medicines you take. How can you care for yourself at home? Ease discomfort from your baby's kicking   Change your position. Sometimes this will cause your baby to change position too.  Take a deep breath while you raise your arm over your head. Then breathe out while you drop your arm. Do Kegel exercises to prevent urine from leaking   You can do Kegel exercises while you stand or sit. ? Squeeze the same muscles you would use to stop your urine. Your belly and thighs should not move. ? Hold the squeeze for 3 seconds, and then relax for 3 seconds. ? Start with 3 seconds. Then add 1 second each week until you are able to squeeze for 10 seconds. ? Repeat the exercise 10 to 15 times for each session. Do three or more sessions each day.   Ease or reduce swelling in your feet, ankles, hands, and fingers   If your fingers are puffy, take off your rings.  Do not eat high-salt foods, such as potato chips.  Prop up your feet on a stool or couch as much as possible. Sleep with pillows under your feet.  Do not stand for long periods of time or wear tight shoes.  Wear support stockings. Where can you learn more? Go to https://agencyQpepiceweb.healthCraneware. org and sign in to your [employer] account. Enter G264 in the Majitek box to learn more about \"Weeks 22 to 26 of Your Pregnancy: Care Instructions. \"     If you do not have an account, please click on the \"Sign Up Now\" link. Current as of: June 16, 2021               Content Version: 13.2  © 0498-7450 [employer]. Care instructions adapted under license by [facility] ([facility]). If you have questions about a medical condition or this instruction, always ask your healthcare professional. [place] [address] any warranty or liability for your use of this information. Patient Education        Learning About When to Call Your Doctor During Pregnancy (After 20 Weeks)  Overview  It's common to have concerns about what might be a problem when you're pregnant. Most pregnancies don't have any serious problems. But it's still important to know when to call your doctor if you have certain symptoms orsigns of labor. These are general suggestions. Your doctor may give you some more informationabout when to call. When to call your doctor (after 20 weeks)  Call 911 anytime you think you may need emergency care. For example, call if:   You have severe vaginal bleeding.  You have sudden, severe pain in your belly.  You passed out (lost consciousness).  You have a seizure.  You see or feel the umbilical cord.    You think you are about to deliver your baby and can't make it safely to the hospital.  Call your doctor now or seek immediate medical care if:   You have vaginal bleeding.  You have belly pain.  You have a fever.  You have symptoms of preeclampsia, such as:  ? Sudden swelling of your face, hands, or feet. ? New vision problems (such as dimness, blurring, or seeing spots). ? A severe headache.  You have a sudden release of fluid from your vagina. (You think your water broke.)   You think that you may be in labor. This means that you've had at least 6 contractions in an hour.  You notice that your baby has stopped moving or is moving much less than normal.   You have symptoms of a urinary tract infection. These may include:  ? Pain or burning when you urinate. ? A frequent need to urinate without being able to pass much urine. ? Pain in the flank, which is just below the rib cage and above the waist on either side of the back. ? Blood in your urine. Watch closely for changes in your health, and be sure to contact your doctor if:   You have vaginal discharge that smells bad.  You have skin changes, such as:  ? A rash. ? Itching. ? Yellow color to your skin.  You have other concerns about your pregnancy. If you have labor signs at 37 weeks or more  If you have signs of labor at 37 weeks or more, your doctor may tell you tocall when your labor becomes more active. Symptoms of active labor include:   Contractions that are regular.  Contractions that are less than 5 minutes apart.  Contractions that are hard to talk through. Follow-up care is a key part of your treatment and safety. Be sure to make and go to all appointments, and call your doctor if you are having problems. It's also a good idea to know your test results and keep alist of the medicines you take. Where can you learn more? Go to https://Questetrakim.Codingpeople. org and sign in to your ComplyMD account. Enter  in the Tracour box to learn more about \"Learning About When to Call Your Doctor During Pregnancy (After 20 Weeks). \"     If you do not have an account, please click on the \"Sign Up Now\" link. Current as of: June 16, 2021               Content Version: 13.2  © 2006-2022 Healthwise, [facility]. Care instructions adapted under license by [facility] ([facility]). If you have questions about a medical condition or this instruction, always ask your healthcare professional. [address] any warranty or liability for your use of this information. Please arrive for your scheduled appointment at least 15 minutes early with your actual insurance card+ a photo ID. Also if you need any refills ordered or have questions, it may take up 48 hours to reply. Please allow ample time for your refills. Call me when you use last refill. Thank you for your cooperation. Call your primary obstetrician with bleeding, leaking of fluid, abdominal tenderness, headache, blurry vision, epigastric pain and increased urinary frequency. If you are experiencing an emergency and need immediate help, call 911 or go to go emergency room or labor and delivery. if you are sick, not feeling well or have an infectious process going on please reschedule your appointment by calling [phone]. Also if any family members are not feeling well, please do not bring them to your appointment. We appreciate your cooperation. We are doing this in order to protect our pregnant mothers+ their babies. if you are sick, not feeling well or have an infectious process going on please reschedule your appointment by calling [phone]. Also if any family members are not feeling well, please do not bring them to your appointment. We appreciate your cooperation. We are doing this in order to protect our pregnant mothers+ their babies.

## 2022-05-17 NOTE — PROGRESS NOTES
May 17, 2022      Jose Chappell, [address] [facility],  [address]     RE:  Matt Anaya  : 1980   AGE: 46 y.o. This report has been created using voice recognition software. It may contain errors which are inherent in voice recognition technology. Dear Dr. Killian Goss:      I had the pleasure of meeting with Ms. Mcfarlane for a return consultation. As you know, Ms. Fuad Merino  is a 46 y.o.  at 22w4d ([address]) who is being followed by our office for multiple medical issues. Today, Ms. Mcfarlane reports that she feels well. She notes good fetal movement and denies any symptoms of leaking of fluid, vaginal bleeding, and/or contractions. She had a fetal ultrasound that was notable for the following. There is a single intrauterine gestation in a variable presentation with a heart rate of 141 beats per minute. The placenta is anterior. The amniotic fluid index is normal.  The composite gestational age is 24w0d. The estimated fetal weight is at the 40th percentile. Transvaginal cervical length 4.2 cm without funneling. PERTINENT PHYSICAL EXAMINATION:   /69   Pulse 74   Wt 168 lb (76.2 kg)   LMP 2021   BMI 30.73 kg/m²     Urine dipstick:   Negative for Glucose    Negative for Albumin      A fetal ultrasound assessment was performed today. A report is enclosed for your review.     Assessment & Plan:  46 y.o.  at 22w4d ([address]) with:    1.  Pregnancy dating -- The patient's pregnancy dating was again reviewed.  The patient reported that she had her Mirena removed on 2021.  She was tracking her periods.  Per the vickie she was using, she was having 25-day cycles.  She reported a last menstrual period of 2021. [facility], she had 2 periods in December. Simran Sierra first was on 2021.  This would have been a 16-day cycle.     The patient's initial ultrasound was reviewed. Verlee Peabody on her last menstrual period, she was 13 weeks 2 days, SHARA 9/24/2022Rain Charles on the ultrasound, she was 14 weeks 3 days, SHARA 9/16/2022.     There was an 8-day difference between dating by her LMP and the patient's initial ultrasound. Given there was greater than a 7-day discrepancy, the recommendation was made to use the 14-week ultrasound for dating.  Thus, her SHARA is 9/16/2022 based on a 14-week ultrasound.     The patient returned for an anatomy ultrasound. She was 19 weeks 3 days by her 14-week ultrasound. The composite gestational age was 22 weeks 5 days. Fetal growth was appropriate for the gestational age based on her 14-week ultrasound. The patient returns today at 22 weeks 4 days. The composite gestational age was 25 weeks gestation and again agreed with dating by her 14-week ultrasound.     The patient was scheduled to return in 3 weeks to reevaluate fetal growth.     2.  Advanced maternal age  -- The patient was previously counseled regarding the implications of advanced maternal age in pregnancy, specifically that of aneuploidy.    Counseling was reviewed. The patient did not have any additional questions or concerns.     The patient previously completed screening with NIPT. Ron Anand results were available for review. Virginia Jeremiah fetal fraction was 9% and the results were low risk for aneuploidy.  Per the report, the fetus is female. VirginiamodulR genetic screening results were reviewed with the patient.  The patient declined any additional diagnostic testing.     Since the patient had early screening with NIPT, a maternal serum AFP is recommended between 15 and 22 weeks to screen for open neural tube defects.   Testing was ordered today.     Again, recent studies have reported that there may be an increased risk for fetal loss in the setting of advanced maternal age. Keanu Wade, increased surveillance is recommended.  I recommend monitoring fetal growth serially, every 4 weeks beginning at 24-26 weeks' gestation. Shelbie Soto should monitor fetal kick counts daily starting at 28 weeks' gestation.  At 36 weeks' gestation, she should be scheduled for a weekly non stress test or biophysical profile. I recommend delivery at 44 week's gestation.     Given there is an increased risk for hypertensive disorders of pregnancy in the setting of advanced maternal age, the possible utility of a low dose aspirin in reducing her risk for hypertensive disorders of pregnancy was reviewed.  The risks and benefits of low dose aspirin were discussed. Juve De Santiago was counseled to continue taking a daily low-dose aspirin for the remainder of pregnancy and 6 to 8 weeks postpartum.     3. History of  X3 -- The patient's first pregnancy was delivered via  secondary to a breech presentation.  She then had a 2 repeat C-sections.  The placenta is anterior and above the lower uterine segment. She plans to have a repeat  for delivery.      4. History of anemia -- The patient reported that her prior pregnancies were complicated by anemia.  Secondary to this history, a baseline nutrition panel and thyroid function studies were recommended.       Testing was completed on 3/30/2022, her results included: H/H12.1/35.6, MCV 87, platelet count 524,838, potassium 3.8, creatinine 0.7, calcium 9.2, ALT 20, AST 18, ferritin 28, folate >20, vitamin B12 591, vitamin D 31, magnesium 1.9, TSH 2.9, free T4 1.19, free T3 3, TPO negative, antithyroglobulin antibody negative, hemoglobin A1c 4.8%, urinalysis negative, urine culture negative, urine protein creatinine ratio 0.1.  --Additional recommendations are below.     5.  History of macrosomia -- The patient's third child weighed 9 pounds 1 ounce at 39 weeks gestation.  She had a repeat  for delivery. She denies having a history of gestational diabetes. Because of this history, she is at risk for having another child with macrosomia.         Fetal growth should be monitored serially, every 3 to 4 weeks starting at 24 to 26 weeks gestation.     A baseline hemoglobin A1c was checked on 3/30/2022. It was normal at 4.8%.     The patient had a baseline Glucola on 3/8/2022 that was normal at 128.  She should have repeat screening at 24 to 28 weeks gestation.     6.  Work exposures -- The patient previously indicated that she works as an [employer]. Rachel Denis was counseled that she may be at increased risk for viral exposures such as CMV and parvovirus.       She was given orders to have baseline screening for these viruses.    Baseline screening was completed on 3/30/2022. Her results included: CMV IgG positive/IgM negative, parvovirus IgG positive/IgM negative. The results indicate past exposure to CMV and immunity to parvovirus. The results were previously reviewed with the patient.     Precautions were reviewed.     7.  Family history of bleeding disorder -- The patient reported that her nephew (sister's son) has some type of bleeding disorder. Rachel Denis was unsure of the name of the condition.  She was encouraged to obtain additional information regarding her nephew's condition.       The patient previously reported that her nephew has an issue with factor V. She was unsure if he has factor V Leiden or factor V deficiency. She states that the condition was inherited from his father. The patient reports that her sister was tested and does not have this condition.     Today, the patient reports that her nephew has a factor V deficiency.     The patient was counseled that if her sister does not have this condition or gene, that I would not anticipate any increased risk for the patient or her child.       8.  Family history of thyroid disease -- The patient's family history is notable for thyroid disease in her mother. Jodi Lies this family history, baseline thyroid function studies and a screening thyroid peroxidase antibody/antithyroglobulin antibody are recommended.     The patient completed baseline screening on 3/30/2022, her results included: TSH 2.9, free T4 1.19, free T3 3, TPO negative, antithyroglobulin antibody negative. The results were reviewed with the patient.     9.  History of ovarian cyst -- The patient reported a history of an ovarian cyst.  She was unsure which side the cyst was on.  She was supposed to have follow-up with her primary OB/GYN but did not have the follow-up imaging.  The ovaries were evaluated again today and appeared normal bilaterally.  No cystic structures were seen in the adnexa.  The reassuring findings were reviewed with the patient.     10.  Vaccination in pregnancy -- The patient was  previously counseled regarding the recommendations for vaccination in pregnancy. Counseling was reviewed. The patient did not have any additional questions or concerns.     The patient was counseled regarding the recommendations for the Tdap vaccination in pregnancy. Risks and benefits were discussed. The vaccination is typically administered between 27 and 36 weeks gestation and recommended to confer protection against whooping cough to the . The patient plans to discuss this with her primary provider at her next visit.  The patient was also counseled that her partner in any individuals who will be in close contact with the  should also be vaccinated for whooping cough.     The patient was counseled regarding recommendation for the flu vaccination in pregnancy for both maternal and infant safety.  Risks and benefits were reviewed. Derrick Parra was encouraged to have this vaccination as an outpatient.   --She received a flu vaccination     Counseling was provided regarding the recommendation for the Covid vaccination in pregnancy.  I advised the patient that the [employer] of Obstetrics and Gynecology and The Society for Maternal Fetal Medicine recommend that all pregnant women be vaccinated for COVID-19.  \"Data have shown that COVID-19 infection puts pregnant people at increased risk of severe complications and even death; yet only about 22% of pregnant individuals have received 1 more doses of COVID-19 vaccine according to the [employer] for Disease Control and Prevention. \"     \" Recent data have shown that more than 95% of those were hospitalized and/or dying from COVID-19 are those who have remained unvaccinated.  Pregnant individuals who have decided to wait until after delivery to be vaccinated may be inadvertently exposing themselves to an increased risk of severe illness or death. \"      \" COVID-19 vaccination is the best testing to reduce maternal and fetal complications of THXFV-28 infection among pregnant people,\" said Naveed Funez MD, president of the Society for Maternal Fetal Medicine, sub-specialists.     The patient was counseled that in the event she opts not to have the Covid vaccination, she should alert her provider immediately if she test positive for Covid.  Pregnant women are on the priority list for treatment with monoclonal antibody.  This intervention has been shown to decrease the risk for hospitalization and complications related to Covid in pregnancy. --She received a Covid vaccination     The patient expressed verbal understanding of this counseling.      11. Nausea and vomiting of pregnancy, improving  -- The patients reports that her nausea and vomiting symptoms are improving. She has gained 4 pounds since her last visit. She still has complaints of daily symptoms of nausea and decreased appetite. She denies any signs of symptoms of dehydration. Precautions were reviewed.       The patient was encouraged to eat small amounts of food every 2-3 hours during the day. He was also encouraged to stay well-hydrated.     A repeat nutrition panel (CBC, CMP, magnesium, ferritin, folate, vitamin B12, vitamin D 25 [place]) was recommended. Baseline testing was completed on 3/30/2022, the results are summarized above. Repeat testing was ordered today.     The patient was previously provided with an order for Pepcid, 20 mg twice daily, and Zofran, 4 mg every 8 hours as needed for nausea. She was counseled to call and/or return with worsening symptoms.     12. Constipation, improving -- The patient previously had complaints of constipation. Symptoms are improving somewhat. She was again counseled to stay well hydrated and increase fiber, fruit, and vegetable intake. Increased fiber intake was encouraged. She can use colace daily. A prescription was provided. She can use a gentle laxative as needed. She can also use a probiotic as needed. Precautions were reviewed.     13. Dolichocephaly -- Today's ultrasound was reviewed with the patient. There is dolichocephaly in that the cephalic index is again 18% (normal >75%). The patient was counseled that there is concern for dolichocephaly given the cephalic index is <23%. This generally indicates that at this time, the fetal head is longer than it is wide. The fetal head shape varies during gestation and can be affected by factors such as presentation (more common with breech presentation), the amniotic fluid level, and having a multiple gestation. Additionally, it can be a normal variant (if mild). If the condition persists, it may be suggestive of a genetic condition or syndrome and/or craniosynostosis. The fetal head measurements will be reassessed at her follow up ultrasound. --I requested the patient return for a follow-up assessment in 3 weeks unless there is a clinical reason for her to return prior to that time. She is to call if she has any problems or questions prior to her next visit. Further evaluation and management will be dependent on her clinical presentation and the results of her testing. --The patient was advised to call if she has any increased vaginal discharge, vaginal bleeding, contractions, abdominal pain, back pain or any new significant symptomatology prior to her next visit.  I advised her that these are signs and symptoms of cervical change and require follow-up assessment when they occur. Preeclampsia precautions were also reviewed with the patient. --The patient was also counseled to call and/or return with any concerns for decreased fetal activity. --The patient is to continue to follow with you in your office for ongoing obstetric care. --The total time spent on today's visit was 30 minutes. This included preparation for the visit (i.e. reviewing prior external notes and test results), performance of a medically appropriate history and examination, counseling, orders for medications, tests or other procedures, and coordination of care. Greater than 50% of the time was spent face-to-face with the patient. This time is exclusive of procedures performed. I answered all of  the patient's questions to her satisfaction. I asked her to call if she had any additional questions prior to her next visit. --At the conclusion of the visit, the patient appeared to have a good understanding of the issues discussed. I answered all of her questions to her satisfaction. I asked her to call if she had any additional questions prior to her next visit. --Thank you for allowing me to participate in the care of this pleasant patient. Please don't hesitate to call me if you have any questions. Sincerely,      Joey Lewis MD, [address]  [phone]      *All or parts of this note may have been generated using a voice recognition program. There may be typo, grammar, or Word substitution errors that have escaped my review of this note.

## 2022-05-17 NOTE — LETTER
[facility] Maternal Fetal Medicine  [address] [facility] [address]  Phone: [phone]  Fax: [phone]           Samantha Romberg, MD      May 17, 2022     Patient: Richard Figueroa   MR Number: [id number]   YOB: 1980   Date of Visit: 2022       Dear Dr. Zuleima Pineda:    Thank you for referring Richard Figueroa to me for evaluation/treatment. Below are the relevant portions of my assessment and plan of care. If you have questions, please do not hesitate to call me. I look forward to following Luzmaria Timothy along with you. Sincerely,        Samantha Romberg, MD    CC providers:  Daniel Reynoso MD  [address] [place] [address]  Via [facility] [address]       May 17, 2022      Daniel Reynoso MD  [employer]  [facility],  [address] [place] [address]     RE:  Ariadna Johnson  : 1980   AGE: 46 y.o. This report has been created using voice recognition software. It may contain errors which are inherent in voice recognition technology. Dear Dr. Zuleima Pineda:      I had the pleasure of meeting with Ms. Mcfarlane for a return consultation. As you know, Ms. Holly Langford  is a 46 y.o.  at 22w4d ([address]) who is being followed by our office for multiple medical issues. Today, Ms. Mcfarlane reports that she feels well. She notes good fetal movement and denies any symptoms of leaking of fluid, vaginal bleeding, and/or contractions. She had a fetal ultrasound that was notable for the following. There is a single intrauterine gestation in a variable presentation with a heart rate of 141 beats per minute. The placenta is anterior. The amniotic fluid index is normal.  The composite gestational age is 24w0d. The estimated fetal weight is at the 40th percentile. Transvaginal cervical length 4.2 cm without funneling.       PERTINENT PHYSICAL EXAMINATION:   /69   Pulse 74   Wt 168 lb (76.2 kg)   LMP 2021   BMI 30.73 kg/m²     Urine dipstick:   Negative for Glucose Negative for Albumin      A fetal ultrasound assessment was performed today. A report is enclosed for your review. Assessment & Plan:  46 y.o.  at 22w4d ([facility]) with:    1.  Pregnancy dating -- The patient's pregnancy dating was again reviewed.  The patient reported that she had her Mirena removed on 2021.  She was tracking her periods.  Per the vickie she was using, she was having 25-day cycles.  She reported a last menstrual period of 2021. [facility] [place] by [facility], she had 2 periods in December. Gudino Speed first was on 2021.  This would have been a 16-day cycle.     The patient's initial ultrasound was reviewed. Kenia Filomena on her last menstrual period, she was 13 weeks 2 days, SHARA 2022. Kenia Axon on the ultrasound, she was 14 weeks 3 days, SHARA 2022.     There was an 8-day difference between dating by her LMP and the patient's initial ultrasound. Given there was greater than a 7-day discrepancy, the recommendation was made to use the 14-week ultrasound for dating.  Thus, her SHARA is 2022 based on a 14-week ultrasound.     The patient returned for an anatomy ultrasound. She was 19 weeks 3 days by her 14-week ultrasound. The composite gestational age was 22 weeks 5 days. Fetal growth was appropriate for the gestational age based on her 14-week ultrasound. The patient returns today at 22 weeks 4 days. The composite gestational age was 25 weeks gestation and again agreed with dating by her 14-week ultrasound.     The patient was scheduled to return in 3 weeks to reevaluate fetal growth.     2.  Advanced maternal age  -- The patient was previously counseled regarding the implications of advanced maternal age in pregnancy, specifically that of aneuploidy.    Counseling was reviewed.   The patient did not have any additional questions or concerns.     The patient previously completed screening with NIPT. Gudino Speed results were available for review.  The fetal fraction was 9% and the results were low risk for aneuploidy.  Per the report, the fetus is female. [facility] genetic screening results were reviewed with the patient.  The patient declined any additional diagnostic testing.     Since the patient had early screening with NIPT, a maternal serum AFP is recommended between 15 and 22 weeks to screen for open neural tube defects. Testing was ordered today.     Again, recent studies have reported that there may be an increased risk for fetal loss in the setting of advanced maternal age. Jayne Jaimes, increased surveillance is recommended.  I recommend monitoring fetal growth serially, every 4 weeks beginning at 24-26 weeks' gestation. Maggie Tafoya should monitor fetal kick counts daily starting at 28 weeks' gestation.  At 36 weeks' gestation, she should be scheduled for a weekly non stress test or biophysical profile.  I recommend delivery at 44 week's gestation.     Given there is an increased risk for hypertensive disorders of pregnancy in the setting of advanced maternal age, the possible utility of a low dose aspirin in reducing her risk for hypertensive disorders of pregnancy was reviewed.  The risks and benefits of low dose aspirin were discussed. Maggie Tafoya was counseled to continue taking a daily low-dose aspirin for the remainder of pregnancy and 6 to 8 weeks postpartum.     3. History of  X3 -- The patient's first pregnancy was delivered via  secondary to a breech presentation.  She then had a 2 repeat C-sections.  The placenta is anterior and above the lower uterine segment. She plans to have a repeat  for delivery.      4. History of anemia -- The patient reported that her prior pregnancies were complicated by anemia.  Secondary to this history, a baseline nutrition panel and thyroid function studies were recommended.       Testing was completed on 3/30/2022, her results included: H/H12.1/35.6, MCV 87, platelet count 195,456, potassium 3.8, creatinine 0.7, calcium 9.2, ALT 20, AST 18, ferritin 28, folate >20, vitamin B12 591, vitamin D 31, magnesium 1.9, TSH 2.9, free T4 1.19, free T3 3, TPO negative, antithyroglobulin antibody negative, hemoglobin A1c 4.8%, urinalysis negative, urine culture negative, urine protein creatinine ratio 0.1.  --Additional recommendations are below.     5.  History of macrosomia -- The patient's third child weighed 9 pounds 1 ounce at 39 weeks gestation. Jonnie Enriquez had a repeat  for delivery. She denies having a history of gestational diabetes. Because of this history, she is at risk for having another child with macrosomia.        Fetal growth should be monitored serially, every 3 to 4 weeks starting at 24 to 26 weeks gestation.     A baseline hemoglobin A1c was checked on 3/30/2022. It was normal at 4.8%.     The patient had a baseline Glucola on 3/8/2022 that was normal at 128.  She should have repeat screening at 24 to 28 weeks gestation.     6.  Work exposures -- The patient previously indicated that she works as an [employer]. Jonnie Enriquez was counseled that she may be at increased risk for viral exposures such as CMV and parvovirus.       She was given orders to have baseline screening for these viruses.    Baseline screening was completed on 3/30/2022. Her results included: CMV IgG positive/IgM negative, parvovirus IgG positive/IgM negative. The results indicate past exposure to CMV and immunity to parvovirus. The results were previously reviewed with the patient.     Precautions were reviewed.     7.  Family history of bleeding disorder -- The patient reported that her nephew (sister's son) has some type of bleeding disorder. Jonnie Enriquez was unsure of the name of the condition.  She was encouraged to obtain additional information regarding her nephew's condition.       The patient previously reported that her nephew has an issue with factor V. She was unsure if he has factor V Leiden or factor V deficiency. She states that the condition was inherited from his father. The patient reports that her sister was tested and does not have this condition. Today, the patient reports that her nephew has a factor V deficiency.     The patient was counseled that if her sister does not have this condition or gene, that I would not anticipate any increased risk for the patient or her child.       8.  Family history of thyroid disease -- The patient's family history is notable for thyroid disease in her mother. Earma Boxer this family history, baseline thyroid function studies and a screening thyroid peroxidase antibody/antithyroglobulin antibody are recommended.     The patient completed baseline screening on 3/30/2022, her results included: TSH 2.9, free T4 1.19, free T3 3, TPO negative, antithyroglobulin antibody negative. The results were reviewed with the patient.     9.  History of ovarian cyst -- The patient reported a history of an ovarian cyst.  She was unsure which side the cyst was on.  She was supposed to have follow-up with her primary OB/GYN but did not have the follow-up imaging.  The ovaries were evaluated again today and appeared normal bilaterally.  No cystic structures were seen in the adnexa.  The reassuring findings were reviewed with the patient.     10.  Vaccination in pregnancy -- The patient was  previously counseled regarding the recommendations for vaccination in pregnancy. Counseling was reviewed. The patient did not have any additional questions or concerns.     The patient was counseled regarding the recommendations for the Tdap vaccination in pregnancy. Risks and benefits were discussed. The vaccination is typically administered between 27 and 36 weeks gestation and recommended to confer protection against whooping cough to the .  The patient plans to discuss this with her primary provider at her next visit.  The patient was also counseled that her partner in any individuals who will be in close contact with the  should also be vaccinated for whooping cough.     The patient was counseled regarding recommendation for the flu vaccination in pregnancy for both maternal and infant safety.  Risks and benefits were reviewed. Norma Bradshaw was encouraged to have this vaccination as an outpatient. --She received a flu vaccination     Counseling was provided regarding the recommendation for the Covid vaccination in pregnancy.  I advised the patient that the [facility]stetrics and Gynecology and The Society for Maternal Fetal Medicine recommend that all pregnant women be vaccinated for COVID-19.  \"Data have shown that COVID-19 infection puts pregnant people at increased risk of severe complications and even death; yet only about 22% of pregnant individuals have received 1 more doses of COVID-19 vaccine according to the [employer] for Disease Control and Prevention. \"     \" Recent data have shown that more than 95% of those were hospitalized and/or dying from COVID-19 are those who have remained unvaccinated.  Pregnant individuals who have decided to wait until after delivery to be vaccinated may be inadvertently exposing themselves to an increased risk of severe illness or death. \"      \" COVID-19 vaccination is the best testing to reduce maternal and fetal complications of RLTEH-21 infection among pregnant people,\" said Josselin Munoz MD, president of the Society for Maternal Fetal Medicine, sub-specialists.     The patient was counseled that in the event she opts not to have the Covid vaccination, she should alert her provider immediately if she test positive for Covid.  Pregnant women are on the priority list for treatment with monoclonal antibody.  This intervention has been shown to decrease the risk for hospitalization and complications related to Covid in pregnancy. --She received a Covid vaccination     The patient expressed verbal understanding of this counseling.      11.    Nausea and vomiting of pregnancy, improving  -- The patients reports that her nausea and vomiting symptoms are improving. She has gained 4 pounds since her last visit. She still has complaints of daily symptoms of nausea and decreased appetite. She denies any signs of symptoms of dehydration. Precautions were reviewed.       The patient was encouraged to eat small amounts of food every 2-3 hours during the day. He was also encouraged to stay well-hydrated.     A repeat nutrition panel (CBC, CMP, magnesium, ferritin, folate, vitamin B12, vitamin D 25 [place]) was recommended. Baseline testing was completed on 3/30/2022, the results are summarized above. Repeat testing was ordered today. The patient was previously provided with an order for Pepcid, 20 mg twice daily, and Zofran, 4 mg every 8 hours as needed for nausea. She was counseled to call and/or return with worsening symptoms.     12. Constipation, improving -- The patient previously had complaints of constipation. Symptoms are improving somewhat. She was again counseled to stay well hydrated and increase fiber, fruit, and vegetable intake. Increased fiber intake was encouraged. She can use colace daily. A prescription was provided. She can use a gentle laxative as needed. She can also use a probiotic as needed. Precautions were reviewed.     13. Dolichocephaly -- Today's ultrasound was reviewed with the patient. There is dolichocephaly in that the cephalic index is again 35% (normal >75%). The patient was counseled that there is concern for dolichocephaly given the cephalic index is <36%. This generally indicates that at this time, the fetal head is longer than it is wide. The fetal head shape varies during gestation and can be affected by factors such as presentation (more common with breech presentation), the amniotic fluid level, and having a multiple gestation. Additionally, it can be a normal variant (if mild).  If the condition persists, it may be suggestive of a genetic condition or syndrome and/or craniosynostosis. The fetal head measurements will be reassessed at her follow up ultrasound. --I requested the patient return for a follow-up assessment in 3 weeks unless there is a clinical reason for her to return prior to that time. She is to call if she has any problems or questions prior to her next visit. Further evaluation and management will be dependent on her clinical presentation and the results of her testing. --The patient was advised to call if she has any increased vaginal discharge, vaginal bleeding, contractions, abdominal pain, back pain or any new significant symptomatology prior to her next visit. I advised her that these are signs and symptoms of cervical change and require follow-up assessment when they occur. Preeclampsia precautions were also reviewed with the patient. --The patient was also counseled to call and/or return with any concerns for decreased fetal activity. --The patient is to continue to follow with you in your office for ongoing obstetric care. --The total time spent on today's visit was 30 minutes. This included preparation for the visit (i.e. reviewing prior external notes and test results), performance of a medically appropriate history and examination, counseling, orders for medications, tests or other procedures, and coordination of care. Greater than 50% of the time was spent face-to-face with the patient. This time is exclusive of procedures performed. I answered all of  the patient's questions to her satisfaction. I asked her to call if she had any additional questions prior to her next visit. --At the conclusion of the visit, the patient appeared to have a good understanding of the issues discussed. I answered all of her questions to her satisfaction. I asked her to call if she had any additional questions prior to her next visit. --Thank you for allowing me to participate in the care of this pleasant patient.   Please don't hesitate to call me if you have any questions. Sincerely,      Irene Coleman MD, [address]  [phone]      *All or parts of this note may have been generated using a voice recognition program. There may be typo, grammar, or Word substitution errors that have escaped my review of this note.

## 2022-05-19 LAB — URINE CULTURE, ROUTINE: NORMAL

## 2022-05-20 LAB
AFP INTERPRETATION: NORMAL
AFP MOM: 0.98
AFP SPECIMEN: NORMAL
DATING: NORMAL
ESTIMATED DUE DATE: NORMAL
FETUS COUNT: NORMAL
GESTATIONAL AGE CALC AT COLLECT: NORMAL
HISTORY/NEURAL TUBE DEFECTS: NO
INSULIN DEP. DIABETIC: NO
MATERNAL AGE AT EDD: 39.8 YR
MATERNAL WEIGHT: NORMAL
PT AFP: 80 NG/ML
RACE: NORMAL
SMOKING: NO
THYROID PEROXIDASE (TPO) ABS: <4 IU/ML (ref 0–25)

## 2022-05-23 LAB
AFP INTERPRETATION: NORMAL
AFP MOM: 1.02
AFP SPECIMEN: NORMAL
DATING: NORMAL
ESTIMATED DUE DATE: NORMAL
FETUS COUNT: NORMAL
GESTATIONAL AGE CALC AT COLLECT: NORMAL
HISTORY/NEURAL TUBE DEFECTS: NO
INSULIN DEP. DIABETIC: NO
MATERNAL AGE AT EDD: 39.8 YR
MATERNAL WEIGHT: NORMAL
PT AFP: 85 NG/ML
RACE: NORMAL
SMOKING: NO

## 2022-05-25 ENCOUNTER — ROUTINE PRENATAL (OUTPATIENT)
Dept: OBGYN | Age: 42
End: 2022-05-25
Payer: COMMERCIAL

## 2022-05-25 VITALS
DIASTOLIC BLOOD PRESSURE: 59 MMHG | BODY MASS INDEX: 29.23 KG/M2 | WEIGHT: 165 LBS | SYSTOLIC BLOOD PRESSURE: 120 MMHG | HEART RATE: 87 BPM | HEIGHT: 63 IN

## 2022-05-25 DIAGNOSIS — Z98.891 HISTORY OF CESAREAN DELIVERY: ICD-10-CM

## 2022-05-25 DIAGNOSIS — O09.522 MULTIGRAVIDA OF ADVANCED MATERNAL AGE IN SECOND TRIMESTER: ICD-10-CM

## 2022-05-25 DIAGNOSIS — Z34.92 PRENATAL CARE IN SECOND TRIMESTER: Primary | ICD-10-CM

## 2022-05-25 DIAGNOSIS — O09.292 HISTORY OF MACROSOMIA IN INFANT IN PRIOR PREGNANCY, CURRENTLY PREGNANT IN SECOND TRIMESTER: ICD-10-CM

## 2022-05-25 DIAGNOSIS — O16.2 ELEVATED BLOOD PRESSURE AFFECTING PREGNANCY IN SECOND TRIMESTER, ANTEPARTUM: ICD-10-CM

## 2022-05-25 LAB
GLUCOSE URINE, POC: NEGATIVE
PROTEIN UA: NEGATIVE

## 2022-05-25 PROCEDURE — G8419 CALC BMI OUT NRM PARAM NOF/U: HCPCS | Performed by: OBSTETRICS & GYNECOLOGY

## 2022-05-25 PROCEDURE — 99213 OFFICE O/P EST LOW 20 MIN: CPT | Performed by: OBSTETRICS & GYNECOLOGY

## 2022-05-25 PROCEDURE — 1036F TOBACCO NON-USER: CPT | Performed by: OBSTETRICS & GYNECOLOGY

## 2022-05-25 PROCEDURE — G8427 DOCREV CUR MEDS BY ELIG CLIN: HCPCS | Performed by: OBSTETRICS & GYNECOLOGY

## 2022-05-31 DIAGNOSIS — R79.0 LOW FERRITIN: ICD-10-CM

## 2022-05-31 DIAGNOSIS — R79.89 LOW VITAMIN D LEVEL: Primary | ICD-10-CM

## 2022-05-31 RX ORDER — FERROUS SULFATE 325(65) MG
325 TABLET ORAL 2 TIMES DAILY
Qty: 60 TABLET | Refills: 5 | Status: SHIPPED | OUTPATIENT
Start: 2022-05-31

## 2022-05-31 RX ORDER — MELATONIN
2000 DAILY
Qty: 60 TABLET | Refills: 5 | Status: SHIPPED | OUTPATIENT
Start: 2022-05-31

## 2022-05-31 NOTE — PROGRESS NOTES
Pt returned call and informed of meds ordered by Dr Neha Shirley and that her AFP was normal.  She verbalized understanding and also wanted Dr Neha Shirley to know that her nephew has Gonzalez Freiberg but it is from his fathers side not her sister.

## 2022-06-08 ENCOUNTER — ROUTINE PRENATAL (OUTPATIENT)
Dept: OBGYN CLINIC | Age: 42
End: 2022-06-08
Payer: COMMERCIAL

## 2022-06-08 ENCOUNTER — ANCILLARY PROCEDURE (OUTPATIENT)
Dept: OBGYN CLINIC | Age: 42
End: 2022-06-08
Payer: COMMERCIAL

## 2022-06-08 VITALS
DIASTOLIC BLOOD PRESSURE: 73 MMHG | WEIGHT: 172.38 LBS | BODY MASS INDEX: 30.53 KG/M2 | HEART RATE: 83 BPM | SYSTOLIC BLOOD PRESSURE: 109 MMHG

## 2022-06-08 DIAGNOSIS — R79.89 LOW VITAMIN D LEVEL: ICD-10-CM

## 2022-06-08 DIAGNOSIS — O09.521 MULTIGRAVIDA OF ADVANCED MATERNAL AGE IN FIRST TRIMESTER: Primary | ICD-10-CM

## 2022-06-08 DIAGNOSIS — Z86.2 HISTORY OF ANEMIA: ICD-10-CM

## 2022-06-08 DIAGNOSIS — R80.9 URINE PROTEIN INCREASED: ICD-10-CM

## 2022-06-08 DIAGNOSIS — R79.0 LOW FERRITIN: ICD-10-CM

## 2022-06-08 DIAGNOSIS — O09.291 HISTORY OF MACROSOMIA IN INFANT IN PRIOR PREGNANCY, CURRENTLY PREGNANT IN FIRST TRIMESTER: ICD-10-CM

## 2022-06-08 LAB
GLUCOSE URINE, POC: NEGATIVE
PROTEIN UA: POSITIVE

## 2022-06-08 PROCEDURE — 99213 OFFICE O/P EST LOW 20 MIN: CPT | Performed by: OBSTETRICS & GYNECOLOGY

## 2022-06-08 PROCEDURE — 81002 URINALYSIS NONAUTO W/O SCOPE: CPT | Performed by: OBSTETRICS & GYNECOLOGY

## 2022-06-08 PROCEDURE — 1036F TOBACCO NON-USER: CPT | Performed by: OBSTETRICS & GYNECOLOGY

## 2022-06-08 PROCEDURE — 76816 OB US FOLLOW-UP PER FETUS: CPT | Performed by: OBSTETRICS & GYNECOLOGY

## 2022-06-08 PROCEDURE — 99214 OFFICE O/P EST MOD 30 MIN: CPT | Performed by: OBSTETRICS & GYNECOLOGY

## 2022-06-08 PROCEDURE — 99213 OFFICE O/P EST LOW 20 MIN: CPT

## 2022-06-08 PROCEDURE — G8427 DOCREV CUR MEDS BY ELIG CLIN: HCPCS | Performed by: OBSTETRICS & GYNECOLOGY

## 2022-06-08 PROCEDURE — 76817 TRANSVAGINAL US OBSTETRIC: CPT | Performed by: OBSTETRICS & GYNECOLOGY

## 2022-06-08 PROCEDURE — G8419 CALC BMI OUT NRM PARAM NOF/U: HCPCS | Performed by: OBSTETRICS & GYNECOLOGY

## 2022-06-08 NOTE — PROGRESS NOTES
Pt here for pregnancy ultrasound  Pt denies any bleeding/cramping/lof  Pt states good fetal movement

## 2022-06-08 NOTE — LETTER
[facility]  [address]  [facility] [address]  Phone: [phone]  Fax: [phone]           Manish Erickson MD      2022     Patient: Zelalem Brown   MR Number: [id number]   YOB: 1980   Date of Visit: 2022       Dear Dr. Alfa Horner:    Thank you for referring Zelalem Brown to me for evaluation/treatment. Below are the relevant portions of my assessment and plan of care. If you have questions, please do not hesitate to call me. I look forward to following [facility] along with you. Sincerely,        Manish Erickson MD    CC providers:  Lui Davenport MD  [address] [facility]  [facility]       2022      Lui Davenport, [address] [place] [address],  [address] [place] [address]     RE:  Renita Alvarado  : 1980   AGE: 46 y.o. This report has been created using voice recognition software. It may contain errors which are inherent in voice recognition technology. Dear Dr. Alfa Horner:      I had the pleasure of meeting with Ms. Mcfarlane for a return consultation. As you know, Ms. Krystal Hodges  is a 46 y.o.  at 25w5d ([address]) who is being followed by our office for multiple medical issues. Today, Ms. Mcfarlane reports that she feels well. She notes good fetal movement and denies any symptoms of leaking of fluid, vaginal bleeding, and/or contractions. She had a fetal ultrasound that was notable for the following. There is a single intrauterine gestation in a cephalic presentation with a heart rate of 136 beats per minute. The placenta is anterior. The amniotic fluid index is normal.  The composite gestational age is 25w2d. The estimated fetal weight is at the 48th percentile. Transvaginal cervical length 4.1 cm without funneling.       PERTINENT PHYSICAL EXAMINATION:   /73   Pulse 83   Wt 172 lb 6 oz (78.2 kg)   LMP 2021   BMI 30.53 kg/m²     Urine dipstick:   Negative for Glucose Trace for Albumin      A fetal ultrasound assessment was performed today. A report is enclosed for your review. Assessment & Plan:  46 y.o.  at 25w5d ([address] [facility] 987) with:    1.  Pregnancy dating -- The patient's pregnancy dating was previously reviewed.  The patient reported that she had her Mirena removed on 2021.  She was tracking her periods.  Per the vickie she was using, she was having 25-day cycles.  She reported a last menstrual period of 2021. Tanika Harris, she had 2 periods in December. Maria Elena Odell first was on 2021.  This would have been a 16-day cycle.     The patient's initial ultrasound was reviewed. Vale Mikhail on her last menstrual period, she was 13 weeks 2 days, SHARA 2022. Vale Mikhail on the ultrasound, she was 14 weeks 3 days, SHARA 2022.     There was an 8-day difference between dating by her LMP and the patient's initial ultrasound. Given there was greater than a 7-day discrepancy, the recommendation was made to use the 14-week ultrasound for dating. Kathia Weinstein is 2022 based on a 14-week ultrasound.     The patient returned for an anatomy ultrasound.  She was 19 weeks 3 days by her 14-week ultrasound.  The composite gestational age was 22 weeks 5 days.  Fetal growth was appropriate for the gestational age based on her 14-week ultrasound.     The patient returns at 22 weeks 4 days. The composite gestational age was 25 weeks gestation and again agreed with dating by her 14-week ultrasound. The patient returned today at 25 weeks 5 days. Fetal growth was again appropriate for the gestational age based on dating by her 14-week ultrasound.     The patient was scheduled to return in 3 weeks to reevaluate fetal growth.     2.  Advanced maternal age  -- The patient was previously counseled regarding the implications of advanced maternal age in pregnancy, specifically that of aneuploidy.     Counseling was reviewed.  The patient did not have any additional questions or concerns.     The patient previously completed screening with NIPT. Isabela Jiménez results were available for review. Alpha Harries fetal fraction was 9% and the results were low risk for aneuploidy.  Per the report, the fetus is female. Alpha Harries genetic screening results were reviewed with the patient.  The patient declined any additional diagnostic testing.     Since the patient had early screening with NIPT, a maternal serum AFP is recommended between 15 and 22 weeks to screen for open neural tube defects.  A maternal serum AFP was normal at 1.02 MOM.     Again, recent studies have reported that there may be an increased risk for fetal loss in the setting of advanced maternal age. Hannah Loomis, increased surveillance is recommended.  I recommend monitoring fetal growth serially, every 4 weeks beginning at 24-26 weeks' gestation. Cynthia Pyle should monitor fetal kick counts daily starting at 28 weeks' gestation.  At 36 weeks' gestation, she should be scheduled for a weekly non stress test or biophysical profile. I recommend delivery at 44 week's gestation.     Given there is an increased risk for hypertensive disorders of pregnancy in the setting of advanced maternal age, the possible utility of a low dose aspirin in reducing her risk for hypertensive disorders of pregnancy was reviewed.  The risks and benefits of low dose aspirin were discussed. Cynthia Pyle was counseled to continue taking a daily low-dose aspirin for the remainder of pregnancy and 6 to 8 weeks postpartum.     3. History of  X3 -- The patient's first pregnancy was delivered via  secondary to a breech presentation.  She then had a 2 repeat C-sections.  The placenta is anterior and above the lower uterine segment. The lower uterine segment appears thin on ultrasound.   The appearance will be monitored on follow-up ultrasound.  She plans to have a repeat  for delivery.      4. History of anemia -- The patient reported that her prior pregnancies were complicated by anemia.  Secondary to this history, a baseline nutrition panel and thyroid function studies were recommended.       Testing was completed on 3/30/2022, her results included: H/H12.1/35.6, MCV 87, platelet count 744,120, potassium 3.8, creatinine 0.7, calcium 9.2, ALT 20, AST 18, ferritin 28, folate >20, vitamin B12 591, vitamin D 31, magnesium 1.9, TSH 2.9, free T4 1.19, free T3 3, TPO negative, antithyroglobulin antibody negative, hemoglobin A1c 4.8%, urinalysis negative, urine culture negative, urine protein creatinine ratio 0.1.  --Additional recommendations are below.     5.  History of macrosomia -- The patient's third child weighed 9 pounds 1 ounce at 39 weeks gestation. Guanako Jarrell had a repeat  for delivery. She denies having a history of gestational diabetes. Because of this history, she is at risk for having another child with macrosomia.        Fetal growth should be monitored serially, every 3 to 4 weeks starting at 24 to 26 weeks gestation.     A baseline hemoglobin A1c was checked on 3/30/2022.  It was normal at 4.8%.     The patient had a baseline Glucola on 3/8/2022 that was normal at 128.  She should have repeat screening at 24 to 28 weeks gestation.     6.  Work exposures -- The patient previously indicated that she works as an [employer]. Guanako Jarrell was counseled that she may be at increased risk for viral exposures such as CMV and parvovirus.       She was given orders to have baseline screening for these viruses.    Baseline screening was completed on 3/30/2022. Cesario Poole results included: CMV IgG positive/IgM negative, parvovirus IgG positive/IgM negative.  The results indicate past exposure to CMV and immunity to parvovirus.  The results were previously reviewed with the patient.     Precautions were reviewed.     7.  Family history of bleeding disorder -- The patient reported that her nephew (sister's son) has some type of bleeding disorder. Guanako Jarrell was unsure of the name of the condition.  She was encouraged to obtain additional information regarding her nephew's condition.       The patient previously reported that her nephew has an issue with factor V.  She was unsure if he has factor V Leiden or factor V deficiency.  She states that the condition was inherited from his father. Kaleigh Cantor patient reports that her sister was tested and does not have this condition.     Today, the patient reports that her nephew has a the factor V Leiden gene mutation. He actually has a clotting disorder. Again, he inherited this condition from his father.   The patient's sister is not a carrier.     The patient was counseled that if her sister does not have this condition or gene, that I would not anticipate any increased risk for the patient or her child.       8.  Family history of thyroid disease -- The patient's family history is notable for thyroid disease in her mother. Adair Cheryl this family history, baseline thyroid function studies and a screening thyroid peroxidase antibody/antithyroglobulin antibody are recommended.     The patient completed baseline screening on 3/30/2022, her results included: TSH 2.9, free T4 1.19, free T3 3, TPO negative, antithyroglobulin antibody negative.  The results were reviewed with the patient.     9.  History of ovarian cyst -- The patient reported a history of an ovarian cyst.  She was unsure which side the cyst was on.  She was supposed to have follow-up with her primary OB/GYN but did not have the follow-up imaging.  The ovaries were evaluated again today and appeared normal bilaterally.  No cystic structures were seen in the adnexa.  The reassuring findings were reviewed with the patient.     10.  Vaccination in pregnancy -- The patient was  previously counseled regarding the recommendations for vaccination in pregnancy.  Counseling was reviewed.  The patient did not have any additional questions or concerns.     The patient was counseled regarding the recommendations for the Tdap vaccination in pregnancy. Risks and benefits were discussed. The vaccination is typically administered between 27 and 36 weeks gestation and recommended to confer protection against whooping cough to the . The patient plans to discuss this with her primary provider at her next visit.  The patient was also counseled that her partner in any individuals who will be in close contact with the  should also be vaccinated for whooping cough.     The patient was counseled regarding recommendation for the flu vaccination in pregnancy for both maternal and infant safety.  Risks and benefits were reviewed. Cody Benitez was encouraged to have this vaccination as an outpatient. --She received a flu vaccination     Counseling was provided regarding the recommendation for the Covid vaccination in pregnancy.  I advised the patient that the [employer] of Obstetrics and Gynecology and The Society for Maternal Fetal Medicine recommend that all pregnant women be vaccinated for COVID-19.  \"Data have shown that COVID-19 infection puts pregnant people at increased risk of severe complications and even death; yet only about 22% of pregnant individuals have received 1 more doses of COVID-19 vaccine according to the [employer] for Disease Control and Prevention. \"     \" Recent data have shown that more than 95% of those were hospitalized and/or dying from COVID-19 are those who have remained unvaccinated.  Pregnant individuals who have decided to wait until after delivery to be vaccinated may be inadvertently exposing themselves to an increased risk of severe illness or death. \"      \" COVID-19 vaccination is the best testing to reduce maternal and fetal complications of FCQLY-42 infection among pregnant people,\" said Denita Marino MD, president of the Society for Maternal Fetal Medicine, sub-specialists.     The patient was counseled that in the event she opts not to have the Covid vaccination, she should alert her provider immediately if she test positive for Covid.  Pregnant women are on the priority list for treatment with monoclonal antibody.  This intervention has been shown to decrease the risk for hospitalization and complications related to Covid in pregnancy. --She received a Covid vaccination     The patient expressed verbal understanding of this counseling.      11.   Nausea and vomiting of pregnancy, improving  -- The patients reports that her nausea and vomiting symptoms are improving. She has gained an additional 4 pounds since her last visit. She still has complaints of daily symptoms of nausea and decreased appetite.  She denies any signs of symptoms of dehydration.  Precautions were reviewed.       The patient was encouraged to eat small amounts of food every 2-3 hours during the day.  He was also encouraged to stay well-hydrated.     A repeat nutrition panel (CBC, CMP, magnesium, ferritin, folate, vitamin B12, vitamin D 25 [place]) was recommended. Baseline testing was completed on 3/30/2022, the results are summarized above. Repeat testing was completed on 5/17/2022, her results included: H/H 11.6/36.5, MCV 93.1, platelet count 590,466, potassium 4.3, creatinine 0.7, calcium 9.4, ALT 15, AST 16, magnesium 1.9, ferritin 21, folate 18.7, vitamin B12 464, vitamin D 30, TSH 2.97, free T4 1.04, free T3 2.5, TPO negative, uric acid 3.5, , urine protein creatinine ratio 0.1 urine culture negative. --Additional recommendations are below     The patient was previously provided with an order for Pepcid, 20 mg twice daily, and Zofran, 4 mg every 8 hours as needed for nausea.  She was counseled to call and/or return with worsening symptoms.     12. Constipation, improving -- The patient previously had complaints of constipation.     She reports that her symptoms are improving somewhat. She was again counseled to stay well hydrated and increase fiber, fruit, and vegetable intake.  Increased fiber intake was encouraged.  She can use colace daily. A prescription was previously provided. She can use a gentle laxative as needed.  She can also use a probiotic as needed.  Precautions were reviewed.     13.  Dolichocephaly, resolved-- Today's ultrasound was reviewed with the patient.  The cephalic index was normal at 82%. Previously, at 22 weeks 4 days, there was dolichocephaly in that the cephalic index is again 99% (normal >75%).  The patient was counseled that there is concern for dolichocephaly given the cephalic index is <95%.  This generally indicates that at this time, the fetal head is longer than it is wide.  The fetal head shape varies during gestation and can be affected by factors such as presentation (more common with breech presentation), the amniotic fluid level, and having a multiple gestation. Additionally, it can be a normal variant (if mild). If the condition persists, it may be suggestive of a genetic condition or syndrome and/or craniosynostosis.  The fetal head measurements will be reassessed at her follow up ultrasound.      14. Proteinuria -- The patient was noted to have trace protein on a urine dip today. She was normotensive with a blood pressure of 109/73 and she denied any signs or symptoms of preeclampsia. She denied any signs or symptoms of a urinary tract infection. She was given orders to have a urine culture and urine P/C ratio. If any abnormalities are detected, she will be contacted with results and follow-up recommendations. Preeclampsia precautions were reviewed with the patient. 15.  Low vitamin D -- The patient's vitamin D was low at 30 on 5/17/2022  --Recommend vitamin D3 1000 IU daily  --Monitor levels serially  --Monitor nutrition panel q4-6 weeks (CBC, CMP, magnesium, ferritin, folate, vitamin B12, vitamin D25OH)  --Repeat testing ordered  --Follow up with PCP for long term monitoring and management    16.   Low ferritin -- The patient's ferritin was low at 21 (considered low if <15 in absence of anemia or <40 in setting of anemia)  --Ferrous sulfate 325 mg BID prescribed  --Monitor levels serially  --Monitor nutrition panel q4-6 weeks (CBC, CMP, magnesium, ferritin, folate, vitamin B12, vitamin D25OH)  --Repeat testing ordered  --Follow up with PCP for long term monitoring and management      --I requested the patient return for a follow-up assessment in 3-4 weeks unless there is a clinical reason for her to return prior to that time. She is to call if she has any problems or questions prior to her next visit. Further evaluation and management will be dependent on her clinical presentation and the results of her testing. --The patient was advised to call if she has any increased vaginal discharge, vaginal bleeding, contractions, abdominal pain, back pain or any new significant symptomatology prior to her next visit. I advised her that these are signs and symptoms of cervical change and require follow-up assessment when they occur. Preeclampsia precautions were also reviewed with the patient. --The patient was also counseled to call and/or return with any concerns for decreased fetal activity. --The patient is to continue to follow with you in your office for ongoing obstetric care. --The total time spent on today's visit was 30 minutes. This included preparation for the visit (i.e. reviewing prior external notes and test results), performance of a medically appropriate history and examination, counseling, orders for medications, tests or other procedures, and coordination of care. Greater than 50% of the time was spent face-to-face with the patient. This time is exclusive of procedures performed. I answered all of  the patient's questions to her satisfaction. I asked her to call if she had any additional questions prior to her next visit. --At the conclusion of the visit, the patient appeared to have a good understanding of the issues discussed.   I answered all of her questions to her satisfaction. I asked her to call if she had any additional questions prior to her next visit. --Thank you for allowing me to participate in the care of this pleasant patient. Please don't hesitate to call me if you have any questions. Sincerely,      Nallely Guerrero MD, [address]  [phone]      *All or parts of this note may have been generated using a voice recognition program. There may be typo, grammar, or Word substitution errors that have escaped my review of this note.

## 2022-06-08 NOTE — PROGRESS NOTES
2022      Lui Davenport MD  Monica Burrell  [place],  [address]     RE:  Renita Alvarado  : 1980   AGE: 46 y.o. This report has been created using voice recognition software. It may contain errors which are inherent in voice recognition technology. Dear Dr. Alfa Horner:      I had the pleasure of meeting with Ms. Mcfarlane for a return consultation. As you know, Ms. Krystal Hodges  is a 46 y.o.  at 25w5d ([address]) who is being followed by our office for multiple medical issues. Today, Ms. Mcfarlane reports that she feels well. She notes good fetal movement and denies any symptoms of leaking of fluid, vaginal bleeding, and/or contractions. She had a fetal ultrasound that was notable for the following. There is a single intrauterine gestation in a cephalic presentation with a heart rate of 136 beats per minute. The placenta is anterior. The amniotic fluid index is normal.  The composite gestational age is 25w2d. The estimated fetal weight is at the 48th percentile. Transvaginal cervical length 4.1 cm without funneling. PERTINENT PHYSICAL EXAMINATION:   /73   Pulse 83   Wt 172 lb 6 oz (78.2 kg)   LMP 2021   BMI 30.53 kg/m²     Urine dipstick:   Negative for Glucose    Trace for Albumin      A fetal ultrasound assessment was performed today. A report is enclosed for your review.     Assessment & Plan:  46 y.o.  at 25w5d ([address]) with:    1.  Pregnancy dating -- The patient's pregnancy dating was previously reviewed.  The patient reported that she had her Mirena removed on 2021.  She was tracking her periods.  Per the vickie she was using, she was having 25-day cycles.  She reported a last menstrual period of 2021. [facility], she had 2 periods in December. Daniela Chaney first was on 2021.  This would have been a 16-day cycle.     The patient's initial ultrasound was reviewed. Juvencio Zambrano on her last menstrual period, she was 13 weeks 2 days, SHARA 9/24/2022Trjerry Gil on the ultrasound, she was 14 weeks 3 days, SHARA 9/16/2022.     There was an 8-day difference between dating by her LMP and the patient's initial ultrasound. Given there was greater than a 7-day discrepancy, the recommendation was made to use the 14-week ultrasound for dating. Elia Bowels is 9/16/2022 based on a 14-week ultrasound.     The patient returned for an anatomy ultrasound.  She was 19 weeks 3 days by her 14-week ultrasound.  The composite gestational age was 22 weeks 5 days.  Fetal growth was appropriate for the gestational age based on her 14-week ultrasound.     The patient returns at 22 weeks 4 days. The composite gestational age was 25 weeks gestation and again agreed with dating by her 14-week ultrasound. The patient returned today at 25 weeks 5 days. Fetal growth was again appropriate for the gestational age based on dating by her 14-week ultrasound.     The patient was scheduled to return in 3 weeks to reevaluate fetal growth.     2.  Advanced maternal age  -- The patient was previously counseled regarding the implications of advanced maternal age in pregnancy, specifically that of aneuploidy.     Counseling was reviewed.  The patient did not have any additional questions or concerns.     The patient previously completed screening with NIPT. Victor Manuel Fatima results were available for review.  The fetal fraction was 9% and the results were low risk for aneuploidy.  Per the report, the fetus is female. [employer] genetic screening results were reviewed with the patient.  The patient declined any additional diagnostic testing.     Since the patient had early screening with NIPT, a maternal serum AFP is recommended between 15 and 22 weeks to screen for open neural tube defects.  A maternal serum AFP was normal at 1.02 MOM.     Again, recent studies have reported that there may be an increased risk for fetal loss in the setting of advanced maternal age. [de-identified], increased surveillance is recommended.  I recommend monitoring fetal growth serially, every 4 weeks beginning at 24-26 weeks' gestation. Ashley Aranda should monitor fetal kick counts daily starting at 28 weeks' gestation.  At 36 weeks' gestation, she should be scheduled for a weekly non stress test or biophysical profile. I recommend delivery at 44 week's gestation.     Given there is an increased risk for hypertensive disorders of pregnancy in the setting of advanced maternal age, the possible utility of a low dose aspirin in reducing her risk for hypertensive disorders of pregnancy was reviewed.  The risks and benefits of low dose aspirin were discussed. Ashley Aranda was counseled to continue taking a daily low-dose aspirin for the remainder of pregnancy and 6 to 8 weeks postpartum.     3. History of  X3 -- The patient's first pregnancy was delivered via  secondary to a breech presentation.  She then had a 2 repeat C-sections.  The placenta is anterior and above the lower uterine segment. The lower uterine segment appears thin on ultrasound.   The appearance will be monitored on follow-up ultrasound.  She plans to have a repeat  for delivery.      4. History of anemia -- The patient reported that her prior pregnancies were complicated by anemia.  Secondary to this history, a baseline nutrition panel and thyroid function studies were recommended.       Testing was completed on 3/30/2022, her results included: H/H12.1/35.6, MCV 87, platelet count 714,562, potassium 3.8, creatinine 0.7, calcium 9.2, ALT 20, AST 18, ferritin 28, folate >20, vitamin B12 591, vitamin D 31, magnesium 1.9, TSH 2.9, free T4 1.19, free T3 3, TPO negative, antithyroglobulin antibody negative, hemoglobin A1c 4.8%, urinalysis negative, urine culture negative, urine protein creatinine ratio 0.1.  --Additional recommendations are below.     5.  History of macrosomia -- The patient's third child weighed 9 pounds 1 ounce at 39 weeks gestation. Ashley Aranda had a repeat  for delivery. She denies having a history of gestational diabetes. Because of this history, she is at risk for having another child with macrosomia.        Fetal growth should be monitored serially, every 3 to 4 weeks starting at 24 to 26 weeks gestation.     A baseline hemoglobin A1c was checked on 3/30/2022.  It was normal at 4.8%. The patient had a baseline Glucola on 3/8/2022 that was normal at 128.  She should have repeat screening at 24 to 28 weeks gestation.     6.  Work exposures -- The patient previously indicated that she works as an [employer]. Ava Wolfe was counseled that she may be at increased risk for viral exposures such as CMV and parvovirus.       She was given orders to have baseline screening for these viruses.    Baseline screening was completed on 3/30/2022. Daniela Chaney results included: CMV IgG positive/IgM negative, parvovirus IgG positive/IgM negative.  The results indicate past exposure to CMV and immunity to parvovirus.  The results were previously reviewed with the patient.     Precautions were reviewed.     7.  Family history of bleeding disorder -- The patient reported that her nephew (sister's son) has some type of bleeding disorder. Ava Wolfe was unsure of the name of the condition.  She was encouraged to obtain additional information regarding her nephew's condition.       The patient previously reported that her nephew has an issue with factor V.  She was unsure if he has factor V Leiden or factor V deficiency.  She states that the condition was inherited from his father. Amelie Sarah patient reports that her sister was tested and does not have this condition.     Today, the patient reports that her nephew has a the factor V Leiden gene mutation. He actually has a clotting disorder. Again, he inherited this condition from his father.   The patient's sister is not a carrier.     The patient was counseled that if her sister does not have this condition or gene, that I would not anticipate any increased risk for the patient or her child.       8.  Family history of thyroid disease -- The patient's family history is notable for thyroid disease in her mother. Jessica Hanley this family history, baseline thyroid function studies and a screening thyroid peroxidase antibody/antithyroglobulin antibody are recommended.     The patient completed baseline screening on 3/30/2022, her results included: TSH 2.9, free T4 1.19, free T3 3, TPO negative, antithyroglobulin antibody negative.  The results were reviewed with the patient.     9.  History of ovarian cyst -- The patient reported a history of an ovarian cyst.  She was unsure which side the cyst was on.  She was supposed to have follow-up with her primary OB/GYN but did not have the follow-up imaging.  The ovaries were evaluated again today and appeared normal bilaterally.  No cystic structures were seen in the adnexa.  The reassuring findings were reviewed with the patient.     10.  Vaccination in pregnancy -- The patient was  previously counseled regarding the recommendations for vaccination in pregnancy.  Counseling was reviewed.  The patient did not have any additional questions or concerns.     The patient was counseled regarding the recommendations for the Tdap vaccination in pregnancy. Risks and benefits were discussed. The vaccination is typically administered between 27 and 36 weeks gestation and recommended to confer protection against whooping cough to the . The patient plans to discuss this with her primary provider at her next visit.  The patient was also counseled that her partner in any individuals who will be in close contact with the  should also be vaccinated for whooping cough.     The patient was counseled regarding recommendation for the flu vaccination in pregnancy for both maternal and infant safety.  Risks and benefits were reviewed. Norma Bradshaw was encouraged to have this vaccination as an outpatient.   --She received a flu reviewed.       The patient was encouraged to eat small amounts of food every 2-3 hours during the day. May Pan was also encouraged to stay well-hydrated.     A repeat nutrition panel (CBC, CMP, magnesium, ferritin, folate, vitamin B12, vitamin D 25 [place]) was recommended. Baseline testing was completed on 3/30/2022, the results are summarized above. Repeat testing was completed on 5/17/2022, her results included: H/H 11.6/36.5, MCV 93.1, platelet count 862,913, potassium 4.3, creatinine 0.7, calcium 9.4, ALT 15, AST 16, magnesium 1.9, ferritin 21, folate 18.7, vitamin B12 464, vitamin D 30, TSH 2.97, free T4 1.04, free T3 2.5, TPO negative, uric acid 3.5, , urine protein creatinine ratio 0.1 urine culture negative. --Additional recommendations are below     The patient was previously provided with an order for Pepcid, 20 mg twice daily, and Zofran, 4 mg every 8 hours as needed for nausea.  She was counseled to call and/or return with worsening symptoms.     12. Constipation, improving -- The patient previously had complaints of constipation. She reports that her symptoms are improving somewhat. She was again counseled to stay well hydrated and increase fiber, fruit, and vegetable intake.  Increased fiber intake was encouraged.  She can use colace daily. A prescription was previously provided. She can use a gentle laxative as needed.  She can also use a probiotic as needed.  Precautions were reviewed.     13.  Dolichocephaly, resolved-- Today's ultrasound was reviewed with the patient.  The cephalic index was normal at 82%.     Previously, at 22 weeks 4 days, there was dolichocephaly in that the cephalic index is again 49% (normal >75%).  The patient was counseled that there is concern for dolichocephaly given the cephalic index is <12%.  This generally indicates that at this time, the fetal head is longer than it is wide.  The fetal head shape varies during gestation and can be affected by factors such as presentation (more common with breech presentation), the amniotic fluid level, and having a multiple gestation. Additionally, it can be a normal variant (if mild). If the condition persists, it may be suggestive of a genetic condition or syndrome and/or craniosynostosis.  The fetal head measurements will be reassessed at her follow up ultrasound.      14. Proteinuria -- The patient was noted to have trace protein on a urine dip today. She was normotensive with a blood pressure of 109/73 and she denied any signs or symptoms of preeclampsia. She denied any signs or symptoms of a urinary tract infection. She was given orders to have a urine culture and urine P/C ratio. If any abnormalities are detected, she will be contacted with results and follow-up recommendations. Preeclampsia precautions were reviewed with the patient. 15.  Low vitamin D -- The patient's vitamin D was low at 30 on 5/17/2022  --Recommend vitamin D3 1000 IU daily  --Monitor levels serially  --Monitor nutrition panel q4-6 weeks (CBC, CMP, magnesium, ferritin, folate, vitamin B12, vitamin D25OH)  --Repeat testing ordered  --Follow up with PCP for long term monitoring and management    16. Low ferritin -- The patient's ferritin was low at 21 (considered low if <15 in absence of anemia or <40 in setting of anemia)  --Ferrous sulfate 325 mg BID prescribed  --Monitor levels serially  --Monitor nutrition panel q4-6 weeks (CBC, CMP, magnesium, ferritin, folate, vitamin B12, vitamin D25OH)  --Repeat testing ordered  --Follow up with PCP for long term monitoring and management      --I requested the patient return for a follow-up assessment in 3-4 weeks unless there is a clinical reason for her to return prior to that time. She is to call if she has any problems or questions prior to her next visit. Further evaluation and management will be dependent on her clinical presentation and the results of her testing.      --The patient was advised to call if she has any increased vaginal discharge, vaginal bleeding, contractions, abdominal pain, back pain or any new significant symptomatology prior to her next visit. I advised her that these are signs and symptoms of cervical change and require follow-up assessment when they occur. Preeclampsia precautions were also reviewed with the patient. --The patient was also counseled to call and/or return with any concerns for decreased fetal activity. --The patient is to continue to follow with you in your office for ongoing obstetric care. --The total time spent on today's visit was 30 minutes. This included preparation for the visit (i.e. reviewing prior external notes and test results), performance of a medically appropriate history and examination, counseling, orders for medications, tests or other procedures, and coordination of care. Greater than 50% of the time was spent face-to-face with the patient. This time is exclusive of procedures performed. I answered all of  the patient's questions to her satisfaction. I asked her to call if she had any additional questions prior to her next visit. --At the conclusion of the visit, the patient appeared to have a good understanding of the issues discussed. I answered all of her questions to her satisfaction. I asked her to call if she had any additional questions prior to her next visit. --Thank you for allowing me to participate in the care of this pleasant patient. Please don't hesitate to call me if you have any questions. Sincerely,      Rani Smith MD, [place] [address]  [phone]      *All or parts of this note may have been generated using a voice recognition program. There may be typo, grammar, or Word substitution errors that have escaped my review of this note.

## 2022-06-15 ENCOUNTER — ROUTINE PRENATAL (OUTPATIENT)
Dept: OBGYN | Age: 42
End: 2022-06-15
Payer: COMMERCIAL

## 2022-06-15 VITALS
DIASTOLIC BLOOD PRESSURE: 53 MMHG | BODY MASS INDEX: 31.47 KG/M2 | HEART RATE: 92 BPM | SYSTOLIC BLOOD PRESSURE: 116 MMHG | HEIGHT: 62 IN | WEIGHT: 171 LBS

## 2022-06-15 DIAGNOSIS — T14.8XXA BRUISING: ICD-10-CM

## 2022-06-15 DIAGNOSIS — Z3A.26 26 WEEKS GESTATION OF PREGNANCY: Primary | ICD-10-CM

## 2022-06-15 LAB
GLUCOSE URINE, POC: NEGATIVE
PROTEIN UA: NEGATIVE

## 2022-06-15 PROCEDURE — G8427 DOCREV CUR MEDS BY ELIG CLIN: HCPCS | Performed by: OBSTETRICS & GYNECOLOGY

## 2022-06-15 PROCEDURE — G8419 CALC BMI OUT NRM PARAM NOF/U: HCPCS | Performed by: OBSTETRICS & GYNECOLOGY

## 2022-06-15 PROCEDURE — 81002 URINALYSIS NONAUTO W/O SCOPE: CPT | Performed by: OBSTETRICS & GYNECOLOGY

## 2022-06-15 PROCEDURE — 99212 OFFICE O/P EST SF 10 MIN: CPT | Performed by: OBSTETRICS & GYNECOLOGY

## 2022-06-15 PROCEDURE — 1036F TOBACCO NON-USER: CPT | Performed by: OBSTETRICS & GYNECOLOGY

## 2022-06-15 PROCEDURE — 99213 OFFICE O/P EST LOW 20 MIN: CPT | Performed by: OBSTETRICS & GYNECOLOGY

## 2022-06-15 NOTE — PROGRESS NOTES
SUBJECTIVE:   46 y.o. [id number] female here for routine OB appointment. Pt c/o bruising with ASA. Good FM. No LOF, VB, ctx. 28 week labs ordered. Will need tdap next visit. Discussed PTL and PIH precautions. F/u 3 weeks.     OB History    Para Term  AB Living   4 3 3     3   SAB IAB Ectopic Molar Multiple Live Births           0 3      # Outcome Date GA Lbr Jose Francisco/2nd Weight Sex Delivery Anes PTL Lv   4 Current            3 Term 02/18/15 39w0d  9 lb 1 oz (4.111 kg) M CS-LTranv Spinal N MACKENZIE   2 Term 06/10/11 38w0d  7 lb 3 oz (3.26 kg) M CS-LTranv Spinal N MACKENZIE   1 Term 04/15/05 40w0d  7 lb 3.5 oz (3.274 kg) F CS-LTranv Spinal N MACKENZIE       Past Medical History:   Diagnosis Date    Abnormal Pap smear of cervix     Depression     Postpartum depression         Past Surgical History:   Procedure Laterality Date     SECTION      HERNIA REPAIR      TONSILLECTOMY AND ADENOIDECTOMY      TYMPANOSTOMY TUBE PLACEMENT          Family History   Problem Relation Age of Onset    Hypertension Father     Diabetes Father    Joey Self [de-identified] Abortions Mother         Social History     Tobacco History     Smoking Status  Former Smoker Quit date  2002    Smokeless Tobacco Use  Never Used          Alcohol History     Alcohol Use Status  Not Currently Comment  occ          Drug Use     Drug Use Status  Never          Sexual Activity     Sexually Active  Yes Partners  Male                  Current Outpatient Medications:     vitamin D3 (CHOLECALCIFEROL) 25 MCG (1000 UT) TABS tablet, Take 2 tablets by mouth daily, Disp: 60 tablet, Rfl: 5    ferrous sulfate (IRON 325) 325 (65 Fe) MG tablet, Take 1 tablet by mouth 2 times daily, Disp: 60 tablet, Rfl: 5    ondansetron (ZOFRAN) 4 MG tablet, Take 1 tablet by mouth every 8 hours as needed for Nausea or Vomiting, Disp: 40 tablet, Rfl: 0    aspirin (ASPIRIN 81) 81 MG chewable tablet, Take 1 tablet by mouth daily, Disp: 30 tablet, Rfl: 6    Prenatal Vit-Fe Fumarate-FA (PRENATAL VITAMIN PO), Take 1 tablet by mouth daily, Disp: , Rfl:     famotidine (PEPCID) 20 MG tablet, Take 1 tablet by mouth 2 times daily (Patient not taking: Reported on 2022), Disp: 60 tablet, Rfl: 3    naproxen (NAPROSYN) 500 MG tablet, Take 1 tablet by mouth 2 times daily as needed for Pain (Patient not taking: Reported on 3/3/2022), Disp: 28 tablet, Rfl: 0    ibuprofen (IBU) 600 MG tablet, Take 1 tablet by mouth every 8 hours as needed for Pain., Disp: 20 tablet, Rfl: 0    traMADol (ULTRAM) 50 MG tablet, Take 50 mg by mouth every 6 hours as needed. (Patient not taking: Reported on 3/3/2022), Disp: , Rfl:     tamsulosin (FLOMAX) 0.4 MG capsule, Take 1 capsule by mouth daily for 30 days. , Disp: 30 capsule, Rfl: 0    ondansetron (ZOFRAN) 4 MG tablet, Take 1 tablet by mouth every 8 hours as needed for Nausea. (Patient not taking: Reported on 3/3/2022), Disp: 20 tablet, Rfl: 0     No Known Allergies     OBJECTIVE:   BP (!) 116/53 (Position: Sitting)   Pulse 92   Ht 5' 2\" (1.575 m)   Wt 171 lb (77.6 kg)   LMP 2021   BMI 31.28 kg/m²     Mother's Prenatal Vitals  BP: (!) 116/53  Weight: 171 lb (77.6 kg)  Heart Rate: 92  Alb/Glu  Albumin: Negative  Glucose: Negative  Prenatal Fetal Information  Fetal Heart Rate: 137  Movement: Present      ASSESSMENT:   1. Debora Valentin is a 46 y.o. female  2. [id number]  3. 26w5d  4. Previous  section x3  5. AMA  6. History of fetal macrosomia  7. Elevated BP at beginning of pregnancy  8.  Dolichocephaly    Patient Active Problem List    Diagnosis Date Noted    Constipation 2022     Priority: Medium    Non-intractable vomiting 2022     Priority: Medium    19 weeks gestation of pregnancy 2022     Priority: Medium    BMI 30.0-30.9,adult 2022     Priority: Medium    Occupational exposure in workplace 2022    BMI 29.0-29.9,adult 2022    Multigravida of advanced maternal age in first trimester 2022    History of macrosomia in infant in prior pregnancy, currently pregnant in second trimester 03/25/2022    History of anemia 03/25/2022        Diagnosis Orders   1. 26 weeks gestation of pregnancy  RPR    Glucose tolerance, 1 hour    CBC    Type and Screen       PLAN:     Orders Placed This Encounter   Procedures    RPR     Standing Status:   Future     Standing Expiration Date:   6/15/2023    Glucose tolerance, 1 hour     Standing Status:   Future     Standing Expiration Date:   6/16/2023    CBC     Standing Status:   Future     Standing Expiration Date:   6/15/2023    Type and Screen     Standing Status:   Future     Standing Expiration Date:   6/15/2023      Return in about 3 weeks (around 7/6/2022) for Prenatal visit.       Electronically signed by Sharri Price DO on 6/15/22

## 2022-06-15 NOTE — PROGRESS NOTES
Patient alert and pleasant with complaints of bruising easily  Here today for prenatal visit. Fetal heart tones obtained without difficulty. Urine for glucose and protein obtained with negative results. Discharge instructions have been discussed with the patient. Patient advised to call our office with any questions or concerns. Voiced understanding.    Lab requisitions given to patient and advised to go to lab to have blood work drawn

## 2022-06-17 PROBLEM — R79.0 LOW FERRITIN: Status: ACTIVE | Noted: 2022-06-17

## 2022-06-17 PROBLEM — R80.9 URINE PROTEIN INCREASED: Status: ACTIVE | Noted: 2022-06-17

## 2022-06-17 PROBLEM — R79.89 LOW VITAMIN D LEVEL: Status: ACTIVE | Noted: 2022-06-17

## 2022-06-27 ENCOUNTER — HOSPITAL ENCOUNTER (OUTPATIENT)
Age: 42
Discharge: HOME | End: 2022-06-27
Payer: COMMERCIAL

## 2022-06-27 ENCOUNTER — TELEPHONE (OUTPATIENT)
Dept: OBGYN | Age: 42
End: 2022-06-27

## 2022-06-27 DIAGNOSIS — O09.291 HISTORY OF MACROSOMIA IN INFANT IN PRIOR PREGNANCY, CURRENTLY PREGNANT IN FIRST TRIMESTER: ICD-10-CM

## 2022-06-27 DIAGNOSIS — R80.9 URINE PROTEIN INCREASED: ICD-10-CM

## 2022-06-27 DIAGNOSIS — R79.0 LOW FERRITIN: ICD-10-CM

## 2022-06-27 DIAGNOSIS — O09.521 MULTIGRAVIDA OF ADVANCED MATERNAL AGE IN FIRST TRIMESTER: ICD-10-CM

## 2022-06-27 DIAGNOSIS — R79.89 LOW VITAMIN D LEVEL: ICD-10-CM

## 2022-06-27 DIAGNOSIS — Z3A.28 28 WEEKS GESTATION OF PREGNANCY: Primary | ICD-10-CM

## 2022-06-27 DIAGNOSIS — T14.8XXA BRUISING: ICD-10-CM

## 2022-06-27 DIAGNOSIS — Z3A.26 26 WEEKS GESTATION OF PREGNANCY: ICD-10-CM

## 2022-06-27 DIAGNOSIS — Z86.2 HISTORY OF ANEMIA: ICD-10-CM

## 2022-06-27 LAB
ABO/RH: NORMAL
ALBUMIN SERPL-MCNC: 3.9 G/DL (ref 3.5–5.2)
ALP BLD-CCNC: 85 U/L (ref 35–104)
ALT SERPL-CCNC: 11 U/L (ref 0–32)
ANION GAP SERPL CALCULATED.3IONS-SCNC: 11 MMOL/L (ref 7–16)
ANTIBODY SCREEN: NORMAL
APTT: 27.5 SEC (ref 24.5–35.1)
AST SERPL-CCNC: 14 U/L (ref 0–31)
BACTERIA: ABNORMAL /HPF
BASOPHILS ABSOLUTE: 0.07 E9/L (ref 0–0.2)
BASOPHILS RELATIVE PERCENT: 0.6 % (ref 0–2)
BILIRUB SERPL-MCNC: 0.2 MG/DL (ref 0–1.2)
BILIRUBIN URINE: NEGATIVE
BLOOD, URINE: NEGATIVE
BUN BLDV-MCNC: 8 MG/DL (ref 6–20)
CALCIUM SERPL-MCNC: 9 MG/DL (ref 8.6–10.2)
CHLORIDE BLD-SCNC: 100 MMOL/L (ref 98–107)
CLARITY: CLEAR
CO2: 23 MMOL/L (ref 22–29)
COLOR: ABNORMAL
CREAT SERPL-MCNC: 0.7 MG/DL (ref 0.5–1)
CREATININE URINE: 39 MG/DL (ref 29–226)
EOSINOPHILS ABSOLUTE: 0.31 E9/L (ref 0.05–0.5)
EOSINOPHILS RELATIVE PERCENT: 2.8 % (ref 0–6)
EPITHELIAL CELLS, UA: ABNORMAL /HPF
FERRITIN: 25 NG/ML
FOLATE: >20 NG/ML (ref 4.8–24.2)
GFR AFRICAN AMERICAN: >60
GFR NON-AFRICAN AMERICAN: >60 ML/MIN/1.73
GLUCOSE BLD-MCNC: 128 MG/DL (ref 74–99)
GLUCOSE TOLERANCE SCREEN 50G: 145 MG/DL (ref 70–140)
GLUCOSE URINE: NEGATIVE MG/DL
HBA1C MFR BLD: 4.5 % (ref 4–5.6)
HCT VFR BLD CALC: 35.3 % (ref 34–48)
HEMOGLOBIN: 11.6 G/DL (ref 11.5–15.5)
IMMATURE GRANULOCYTES #: 0.17 E9/L
IMMATURE GRANULOCYTES %: 1.6 % (ref 0–5)
INR BLD: 1.1
KETONES, URINE: NEGATIVE MG/DL
LACTATE DEHYDROGENASE: 172 U/L (ref 135–214)
LEUKOCYTE ESTERASE, URINE: ABNORMAL
LYMPHOCYTES ABSOLUTE: 1.88 E9/L (ref 1.5–4)
LYMPHOCYTES RELATIVE PERCENT: 17.2 % (ref 20–42)
MAGNESIUM: 2.1 MG/DL (ref 1.6–2.6)
MCH RBC QN AUTO: 29.7 PG (ref 26–35)
MCHC RBC AUTO-ENTMCNC: 32.9 % (ref 32–34.5)
MCV RBC AUTO: 90.5 FL (ref 80–99.9)
MONOCYTES ABSOLUTE: 0.48 E9/L (ref 0.1–0.95)
MONOCYTES RELATIVE PERCENT: 4.4 % (ref 2–12)
NEUTROPHILS ABSOLUTE: 8.05 E9/L (ref 1.8–7.3)
NEUTROPHILS RELATIVE PERCENT: 73.4 % (ref 43–80)
NITRITE, URINE: NEGATIVE
PDW BLD-RTO: 13.6 FL (ref 11.5–15)
PH UA: 7 (ref 5–9)
PLATELET # BLD: 309 E9/L (ref 130–450)
PMV BLD AUTO: 10.1 FL (ref 7–12)
POTASSIUM SERPL-SCNC: 3.7 MMOL/L (ref 3.5–5)
PROTEIN PROTEIN: 6 MG/DL (ref 0–12)
PROTEIN UA: NEGATIVE MG/DL
PROTEIN/CREAT RATIO: 0.2
PROTEIN/CREAT RATIO: 0.2 (ref 0–0.2)
PROTHROMBIN TIME: 11.3 SEC (ref 9.3–12.4)
RBC # BLD: 3.9 E12/L (ref 3.5–5.5)
RBC UA: ABNORMAL /HPF (ref 0–2)
SODIUM BLD-SCNC: 134 MMOL/L (ref 132–146)
SPECIFIC GRAVITY UA: 1.01 (ref 1–1.03)
T3 FREE: 2.2 PG/ML (ref 2–4.4)
T4 FREE: 0.97 NG/DL (ref 0.93–1.7)
TOTAL PROTEIN: 6.8 G/DL (ref 6.4–8.3)
TSH SERPL DL<=0.05 MIU/L-ACNC: 1.47 UIU/ML (ref 0.27–4.2)
URIC ACID, SERUM: 3.3 MG/DL (ref 2.4–5.7)
UROBILINOGEN, URINE: 0.2 E.U./DL
VITAMIN B-12: 415 PG/ML (ref 211–946)
VITAMIN D 25-HYDROXY: 37 NG/ML (ref 30–100)
WBC # BLD: 11 E9/L (ref 4.5–11.5)
WBC UA: ABNORMAL /HPF (ref 0–5)

## 2022-06-27 PROCEDURE — 83036 HEMOGLOBIN GLYCOSYLATED A1C: CPT

## 2022-06-27 PROCEDURE — 86901 BLOOD TYPING SEROLOGIC RH(D): CPT

## 2022-06-27 PROCEDURE — 84156 ASSAY OF PROTEIN URINE: CPT

## 2022-06-27 PROCEDURE — 80053 COMPREHEN METABOLIC PANEL: CPT

## 2022-06-27 PROCEDURE — 85025 COMPLETE CBC W/AUTO DIFF WBC: CPT

## 2022-06-27 PROCEDURE — 82950 GLUCOSE TEST: CPT

## 2022-06-27 PROCEDURE — 83735 ASSAY OF MAGNESIUM: CPT

## 2022-06-27 PROCEDURE — 87077 CULTURE AEROBIC IDENTIFY: CPT

## 2022-06-27 PROCEDURE — 82306 VITAMIN D 25 HYDROXY: CPT

## 2022-06-27 PROCEDURE — 82728 ASSAY OF FERRITIN: CPT

## 2022-06-27 PROCEDURE — 86376 MICROSOMAL ANTIBODY EACH: CPT

## 2022-06-27 PROCEDURE — 82746 ASSAY OF FOLIC ACID SERUM: CPT

## 2022-06-27 PROCEDURE — 81001 URINALYSIS AUTO W/SCOPE: CPT

## 2022-06-27 PROCEDURE — 85730 THROMBOPLASTIN TIME PARTIAL: CPT

## 2022-06-27 PROCEDURE — 36415 COLL VENOUS BLD VENIPUNCTURE: CPT

## 2022-06-27 PROCEDURE — 84439 ASSAY OF FREE THYROXINE: CPT

## 2022-06-27 PROCEDURE — 86900 BLOOD TYPING SEROLOGIC ABO: CPT

## 2022-06-27 PROCEDURE — 87186 SC STD MICRODIL/AGAR DIL: CPT

## 2022-06-27 PROCEDURE — 82607 VITAMIN B-12: CPT

## 2022-06-27 PROCEDURE — 86850 RBC ANTIBODY SCREEN: CPT

## 2022-06-27 PROCEDURE — 84481 FREE ASSAY (FT-3): CPT

## 2022-06-27 PROCEDURE — 87088 URINE BACTERIA CULTURE: CPT

## 2022-06-27 PROCEDURE — 84443 ASSAY THYROID STIM HORMONE: CPT

## 2022-06-27 PROCEDURE — 83615 LACTATE (LD) (LDH) ENZYME: CPT

## 2022-06-27 PROCEDURE — 84550 ASSAY OF BLOOD/URIC ACID: CPT

## 2022-06-27 PROCEDURE — 82570 ASSAY OF URINE CREATININE: CPT

## 2022-06-27 PROCEDURE — 85610 PROTHROMBIN TIME: CPT

## 2022-06-28 ENCOUNTER — ANCILLARY PROCEDURE (OUTPATIENT)
Dept: OBGYN CLINIC | Age: 42
End: 2022-06-28
Payer: COMMERCIAL

## 2022-06-28 ENCOUNTER — ROUTINE PRENATAL (OUTPATIENT)
Dept: OBGYN CLINIC | Age: 42
End: 2022-06-28
Payer: COMMERCIAL

## 2022-06-28 VITALS
BODY MASS INDEX: 31.69 KG/M2 | HEART RATE: 91 BPM | DIASTOLIC BLOOD PRESSURE: 70 MMHG | SYSTOLIC BLOOD PRESSURE: 116 MMHG | WEIGHT: 173.25 LBS

## 2022-06-28 DIAGNOSIS — K59.00 CONSTIPATION, UNSPECIFIED CONSTIPATION TYPE: ICD-10-CM

## 2022-06-28 DIAGNOSIS — N89.8 VAGINAL IRRITATION: ICD-10-CM

## 2022-06-28 DIAGNOSIS — R82.71 BACTERIURIA DURING PREGNANCY IN THIRD TRIMESTER: ICD-10-CM

## 2022-06-28 DIAGNOSIS — R79.89 LOW VITAMIN D LEVEL: ICD-10-CM

## 2022-06-28 DIAGNOSIS — R80.9 URINE PROTEIN INCREASED: ICD-10-CM

## 2022-06-28 DIAGNOSIS — Z3A.28 28 WEEKS GESTATION OF PREGNANCY: ICD-10-CM

## 2022-06-28 DIAGNOSIS — R30.0 DYSURIA: ICD-10-CM

## 2022-06-28 DIAGNOSIS — O99.891 BACTERIURIA DURING PREGNANCY IN THIRD TRIMESTER: ICD-10-CM

## 2022-06-28 DIAGNOSIS — Z3A.28 28 WEEKS GESTATION OF PREGNANCY: Primary | ICD-10-CM

## 2022-06-28 DIAGNOSIS — R79.0 LOW FERRITIN: ICD-10-CM

## 2022-06-28 LAB
GLUCOSE URINE, POC: NEGATIVE
PROTEIN UA: NEGATIVE

## 2022-06-28 PROCEDURE — G8427 DOCREV CUR MEDS BY ELIG CLIN: HCPCS | Performed by: OBSTETRICS & GYNECOLOGY

## 2022-06-28 PROCEDURE — 76819 FETAL BIOPHYS PROFIL W/O NST: CPT | Performed by: OBSTETRICS & GYNECOLOGY

## 2022-06-28 PROCEDURE — G8419 CALC BMI OUT NRM PARAM NOF/U: HCPCS | Performed by: OBSTETRICS & GYNECOLOGY

## 2022-06-28 PROCEDURE — 81002 URINALYSIS NONAUTO W/O SCOPE: CPT | Performed by: OBSTETRICS & GYNECOLOGY

## 2022-06-28 PROCEDURE — 99213 OFFICE O/P EST LOW 20 MIN: CPT | Performed by: OBSTETRICS & GYNECOLOGY

## 2022-06-28 PROCEDURE — 76816 OB US FOLLOW-UP PER FETUS: CPT | Performed by: OBSTETRICS & GYNECOLOGY

## 2022-06-28 PROCEDURE — 76821 MIDDLE CEREBRAL ARTERY ECHO: CPT | Performed by: OBSTETRICS & GYNECOLOGY

## 2022-06-28 PROCEDURE — 76820 UMBILICAL ARTERY ECHO: CPT | Performed by: OBSTETRICS & GYNECOLOGY

## 2022-06-28 PROCEDURE — 1036F TOBACCO NON-USER: CPT | Performed by: OBSTETRICS & GYNECOLOGY

## 2022-06-28 PROCEDURE — 99215 OFFICE O/P EST HI 40 MIN: CPT | Performed by: OBSTETRICS & GYNECOLOGY

## 2022-06-28 RX ORDER — NITROFURANTOIN 25; 75 MG/1; MG/1
100 CAPSULE ORAL 2 TIMES DAILY
Qty: 14 CAPSULE | Refills: 0 | Status: SHIPPED | OUTPATIENT
Start: 2022-06-28 | End: 2022-07-05

## 2022-06-28 NOTE — PATIENT INSTRUCTIONS
Patient Education        Weeks 26 to 30 of Your Pregnancy: Care Instructions  Overview     You are now entering your last trimester of pregnancy. Your baby is growing quickly. Kimber Gins probably feel your baby moving around more often. Your doctormay ask you to count your baby's kicks. Your back may ache as your body gets used to your baby's size and length. If you haven't already had the Tdap shot during this pregnancy, talk to your doctor about getting it. It will help protect your  against pertussisinfection. During this time, it's important to take care of yourself and pay attention to what your body needs. If you feel sexual, you can explore ways to be close withyour partner that match your comfort and desire. Follow-up care is a key part of your treatment and safety. Be sure to make and go to all appointments, and call your doctor if you are having problems. It's also a good idea to know your test results and keep alist of the medicines you take. How can you care for yourself at home? Take it easy at work   Take frequent breaks. If possible, stop working when you are tired, and rest during your lunch hour.  Take bathroom breaks every 2 hours.  Change positions often. If you sit for long periods, stand up and walk around.  When you stand for a long time, keep one foot on a low stool with your knee bent. After standing a lot, sit with your feet up.  Avoid fumes, chemicals, and tobacco smoke. Be sexual in your own way   Having sex during pregnancy is okay, unless your doctor tells you not to.  You may be very interested in sex, or you may have no interest at all.  Your growing belly can make it hard to find a good position during intercourse. [place] and explore.  You may get cramps in your uterus when your partner touches your breasts.  A back rub may relieve the backache or cramps that sometimes follow orgasm. Learn about  labor   Watch for signs of  labor.  You have questions about a medical condition or this instruction, always ask your healthcare professional. [place] [address] any warranty or liability for your use of this information. Patient Education        Learning About When to Call Your Doctor During Pregnancy (After 20 Weeks)  Overview  It's common to have concerns about what might be a problem when you're pregnant. Most pregnancies don't have any serious problems. But it's still important to know when to call your doctor if you have certain symptoms orsigns of labor. These are general suggestions. Your doctor may give you some more informationabout when to call. When to call your doctor (after 20 weeks)  Call 911 anytime you think you may need emergency care. For example, call if:   You have severe vaginal bleeding.  You have sudden, severe pain in your belly.  You passed out (lost consciousness).  You have a seizure.  You see or feel the umbilical cord.  You think you are about to deliver your baby and can't make it safely to the hospital.  Call your doctor now or seek immediate medical care if:   You have vaginal bleeding.  You have belly pain.  You have a fever.  You have symptoms of preeclampsia, such as:  ? Sudden swelling of your face, hands, or feet. ? New vision problems (such as dimness, blurring, or seeing spots). ? A severe headache.  You have a sudden release of fluid from your vagina. (You think your water broke.)   You think that you may be in labor. This means that you've had at least 6 contractions in an hour.  You notice that your baby has stopped moving or is moving much less than normal.   You have symptoms of a urinary tract infection. These may include:  ? Pain or burning when you urinate. ? A frequent need to urinate without being able to pass much urine. ? Pain in the flank, which is just below the rib cage and above the waist on either side of the back. ? Blood in your urine.   Watch closely for changes in your health, and be sure to contact your doctor if:   You have vaginal discharge that smells bad.  You have skin changes, such as:  ? A rash. ? Itching. ? Yellow color to your skin.  You have other concerns about your pregnancy. If you have labor signs at 37 weeks or more  If you have signs of labor at 37 weeks or more, your doctor may tell you tocall when your labor becomes more active. Symptoms of active labor include:   Contractions that are regular.  Contractions that are less than 5 minutes apart.  Contractions that are hard to talk through. Follow-up care is a key part of your treatment and safety. Be sure to make and go to all appointments, and call your doctor if you are having problems. It's also a good idea to know your test results and keep alist of the medicines you take. Where can you learn more? Go to [email or web address] org and sign in to your [employer] account. Enter  in the [employer] box to learn more about \"Learning About When to Call Your Doctor During Pregnancy (After 20 Weeks). \"     If you do not have an account, please click on the \"Sign Up Now\" link. Current as of: February 23, 2022               Content Version: 13.3  © 3314-2650 [employer]. Care instructions adapted under license by [facility] ([facility]). If you have questions about a medical condition or this instruction, always ask your healthcare professional. [place] [address] any warranty or liability for your use of this information. Please arrive for your scheduled appointment at least 15 minutes early with your actual insurance card+ a photo ID. Also if you need any refills ordered or have questions, it may take up 48 hours to reply. Please allow ample time for your refills. Call me when you use last refill. Thank you for your cooperation.  Call your primary obstetrician with bleeding, leaking of fluid, abdominal tenderness, headache, blurry vision, epigastric pain and increased urinary frequency. If you are experiencing an emergency and need immediate help, call 911 or go to go emergency room or labor and delivery. Do kick counts after dinner. Call your primary obstetrician if less than 10 kicks in 2 hours after dinner. Call your primary obstetrician with bleeding, leaking of fluid, abdominal tenderness, headache, blurry vision, epigastric pain and increased urinary frequency. if you are sick, not feeling well or have an infectious process going on please reschedule your appointment by calling [phone]. Also if any family members are not feeling well, please do not bring them to your appointment. We appreciate your cooperation. We are doing this in order to protect our pregnant mothers+ their babies. if you are sick, not feeling well or have an infectious process going on please reschedule your appointment by calling [phone]. Also if any family members are not feeling well, please do not bring them to your appointment. We appreciate your cooperation. We are doing this in order to protect our pregnant mothers+ their babies.

## 2022-06-28 NOTE — LETTER
[facility] Maternal Fetal Medicine  [address]  [facility] [address]  Phone: [phone]  Fax: [phone]           Xenia Benoit MD      2022    Patient: Rachelle Reynolds   MR Number: [id number]   YOB: 1980   Date of Visit: 2022       Dear Dr. Margaret Gutiérrez:    Thank you for referring Rachelle Reynolds to me for evaluation/treatment. Below are the relevant portions of my assessment and plan of care. If you have questions, please do not hesitate to call me. I look forward to following Alvarosurekha Gracie along with you. Sincerely,        Xenia Benoit MD    CC providers:  Ramsey Melissa MD  [address] [facility]  [facility]       2022      Ramsey Melissa MD  [address],  [address]     RE:  Herrera Welch  : 1980   AGE: 46 y.o. This report has been created using voice recognition software. It may contain errors which are inherent in voice recognition technology. Dear Dr. Margaret Gutiérrez:      I had the pleasure of meeting with Ms. Mcfarlane for a return consultation. As you know, Ms. Chele Guerra  is a 46 y.o.  at 28w4d ([address]) who is being followed by our office for multiple medical issues. Today, Ms. Mcfarlane reports that she feels well. She notes good fetal movement and denies any symptoms of leaking of fluid, vaginal bleeding, and/or contractions. She had a fetal ultrasound that was notable for the following. There is a single intrauterine gestation in a cephalic presentation with a heart rate of 139 beats per minute. The placenta is anterior. The amniotic fluid index is 17.1 cm. The composite gestational age is 33w3d. The estimated fetal weight is at the 53rd percentile. BPP 8/8.  Umbilical artery Doppler studies are normal.  MCA PSV normal.      PERTINENT PHYSICAL EXAMINATION:   /70   Pulse 91   Wt 173 lb 4 oz (78.6 kg)   LMP 2021   BMI 31.69 kg/m²     Urine dipstick: Negative for Glucose    Negative for Albumin      A fetal ultrasound assessment was performed today. A report is enclosed for your review. Assessment & Plan:  46 y.o.  at 28w4d ([address] [facility]) with:    1.  Pregnancy dating -- The patient's pregnancy dating was previously reviewed.  The patient reported that she had her Mirena removed on 2021.  She was tracking her periods.  Per the vickie she was using, she was having 25-day cycles.  She reported a last menstrual period of 2021. [facility], she had 2 periods in December. Thada Second first was on 2021.  This would have been a 16-day cycle.     The patient's initial ultrasound was reviewed. Osmel Mires on her last menstrual period, she was 13 weeks 2 days, SHARA 2022. Osmel Mires on the ultrasound, she was 14 weeks 3 days, SHARA 2022.     There was an 8-day difference between dating by her LMP and the patient's initial ultrasound. Given there was greater than a 7-day discrepancy, the recommendation was made to use the 14-week ultrasound for dating. Messi Silvino is 2022 based on a 14-week ultrasound.     The patient returned for an anatomy ultrasound.  She was 19 weeks 3 days by her 14-week ultrasound.  The composite gestational age was 22 weeks 5 days.  Fetal growth was appropriate for the gestational age based on her 14-week ultrasound.     The patient returns at 22 weeks 4 days.  The composite gestational age was 25 weeks gestation and again agreed with dating by her 14-week ultrasound.     The patient returned at 25 weeks 5 days. Fetal growth was again appropriate for the gestational age based on dating by her 14-week ultrasound. The patient returns today at 28 weeks 4 days.   Fetal growth was again appropriate for the gestational age based on her 14-week ultrasound.     The patient was scheduled to return in 3 weeks to reevaluate fetal growth.     2.  Advanced maternal age  -- The patient was previously counseled regarding the implications of advanced maternal age in pregnancy, specifically that of aneuploidy.     Counseling was reviewed.  The patient did not have any additional questions or concerns.     The patient previously completed screening with [facility]. [facility] were [facility] for review.  The fetal fraction was 9% and the results were low risk for aneuploidy.  Per the report, the fetus is female. [facility] genetic screening results were reviewed with the patient.  The patient declined any additional diagnostic testing.     Since the patient had early screening with NIPT, a maternal serum AFP is recommended between 15 and 22 weeks to screen for open neural tube defects.  A maternal serum AFP was normal at 1.02 MOM.     Again, recent studies have reported that there may be an increased risk for fetal loss in the setting of advanced maternal age. Dru Primer, increased surveillance is recommended.  I recommend monitoring fetal growth serially, every 4 weeks beginning at 24-26 weeks' gestation. Shikha Prince should monitor fetal kick counts daily starting at 28 weeks' gestation.  At 36 weeks' gestation, she should be scheduled for a weekly non stress test or biophysical profile. I recommend delivery at 44 week's gestation.     Given there is an increased risk for hypertensive disorders of pregnancy in the setting of advanced maternal age, the possible utility of a low dose aspirin in reducing her risk for hypertensive disorders of pregnancy was reviewed.  The risks and benefits of low dose aspirin were discussed. Shikha Prince was counseled to continue taking a daily low-dose aspirin for the remainder of pregnancy and 6 to 8 weeks postpartum.     3. History of  X3 -- The patient's first pregnancy was delivered via  secondary to a breech presentation.  She then had a 2 repeat C-sections.  The placenta is anterior and above the lower uterine segment. The lower uterine segment appears thin on ultrasound.   The appearance will be monitored on follow-up ultrasound.  She plans to have a repeat  for delivery.      4. History of anemia -- The patient reported that her prior pregnancies were complicated by anemia.  Secondary to this history, a baseline nutrition panel and thyroid function studies were recommended.       Testing was completed on 3/30/2022, her results included: H/H12.1/35.6, MCV 87, platelet count 806,189, potassium 3.8, creatinine 0.7, calcium 9.2, ALT 20, AST 18, ferritin 28, folate >20, vitamin B12 591, vitamin D 31, magnesium 1.9, TSH 2.9, free T4 1.19, free T3 3, TPO negative, antithyroglobulin antibody negative, hemoglobin A1c 4.8%, urinalysis negative, urine culture negative, urine protein creatinine ratio 0.1.  --Additional recommendations are below.     5.  History of macrosomia -- The patient's third child weighed 9 pounds 1 ounce at 39 weeks gestation. Ananth Burgess had a repeat  for delivery. She denies having a history of gestational diabetes. Because of this history, she is at risk for having another child with macrosomia.        Fetal growth should be monitored serially, every 3 to 4 weeks starting at 24 to 26 weeks gestation.     Fetal growth was appropriate for the gestational age today at 29 weeks 4 days.     A baseline hemoglobin A1c was checked on 3/30/2022. Darío Brown was normal at 4.8%.     The patient had a baseline Glucola on 3/8/2022 that was normal at 128.  She should have repeat screening at 24 to 28 weeks gestation.     6.  Work exposures -- The patient previously indicated that she works as an [employer]. Ananth Burgess was counseled that she may be at increased risk for viral exposures such as CMV and parvovirus.       She was given orders to have baseline screening for these viruses.    Baseline screening was completed on 3/30/2022. Elfego Shaikh results included: CMV IgG positive/IgM negative, parvovirus IgG positive/IgM negative.  The results indicate past exposure to CMV and immunity to parvovirus.  The results were previously reviewed with the patient.     Precautions were reviewed.     7.  Family history of bleeding disorder -- The patient reported that her nephew (sister's son) has some type of bleeding disorder. Cha Wolff was unsure of the name of the condition.  She was encouraged to obtain additional information regarding her nephew's condition.       The patient previously reported that her nephew has an issue with factor V.  She was unsure if he has factor V Leiden or factor V deficiency.  She states that the condition was inherited from his father. Laith Flores patient reports that her sister was tested and does not have this condition.     Previously, the patient reported that her nephew has a the factor V Leiden gene mutation. He actually has a clotting disorder. Again, he inherited this condition from his father.   The patient's sister is not a carrier.     The patient was counseled that if her sister does not have this condition or gene, that I would not anticipate any increased risk for the patient or her child.       8.  Family history of thyroid disease -- The patient's family history is notable for thyroid disease in her mother. Joshua Lorenzana this family history, baseline thyroid function studies and a screening thyroid peroxidase antibody/antithyroglobulin antibody are recommended.     The patient completed baseline screening on 3/30/2022, her results included: TSH 2.9, free T4 1.19, free T3 3, TPO negative, antithyroglobulin antibody negative.  The results were reviewed with the patient.     9.  History of ovarian cyst -- The patient reported a history of an ovarian cyst.  She was unsure which side the cyst was on.  She was supposed to have follow-up with her primary OB/GYN but did not have the follow-up imaging.      The ovaries were evaluated again today and appeared normal bilaterally.  No cystic structures were seen in the adnexa.  The reassuring findings were reviewed with the patient.     10.  Vaccination in pregnancy -- The patient was  previously counseled regarding the recommendations for vaccination in pregnancy.  Counseling was reviewed.  The patient did not have any additional questions or concerns.     The patient was counseled regarding the recommendations for the Tdap vaccination in pregnancy. Risks and benefits were discussed. The vaccination is typically administered between 27 and 36 weeks gestation and recommended to confer protection against whooping cough to the . The patient plans to discuss this with her primary provider at her next visit.  The patient was also counseled that her partner in any individuals who will be in close contact with the  should also be vaccinated for whooping cough.     The patient was counseled regarding recommendation for the flu vaccination in pregnancy for both maternal and infant safety.  Risks and benefits were reviewed. Marylen Harden was encouraged to have this vaccination as an outpatient. --She received a flu vaccination     Counseling was provided regarding the recommendation for the Covid vaccination in pregnancy.  I advised the patient that the [facility] Obstetrics and Gynecology and The Society for Maternal Fetal Medicine recommend that all pregnant women be vaccinated for COVID-19.  \"Data have shown that COVID-19 infection puts pregnant people at increased risk of severe complications and even death; yet only about 22% of pregnant individuals have received 1 more doses of COVID-19 vaccine according to the [employer] for Disease Control and Prevention. \"     \" Recent data have shown that more than 95% of those were hospitalized and/or dying from COVID-19 are those who have remained unvaccinated.  Pregnant individuals who have decided to wait until after delivery to be vaccinated may be inadvertently exposing themselves to an increased risk of severe illness or death. \"      \" COVID-19 vaccination is the best testing to reduce maternal and fetal complications of MNTTP-45 infection among pregnant people,\" said Woody Mark MD, president of the Society for Maternal Fetal Medicine, sub-specialists.     The patient was counseled that in the event she opts not to have the Covid vaccination, she should alert her provider immediately if she test positive for Covid.  Pregnant women are on the priority list for treatment with monoclonal antibody.  This intervention has been shown to decrease the risk for hospitalization and complications related to Covid in pregnancy. --She received a Covid vaccination     The patient expressed verbal understanding of this counseling.      11.   Nausea and vomiting of pregnancy, improving  -- The patients reports that her nausea and vomiting symptoms are significantly improved.  She has gained an additional 2 pounds since her last visit.  She still has occasional symptoms of nausea.  She denies any signs of symptoms of dehydration.  Precautions were reviewed.       The patient was encouraged to eat small amounts of food every 2-3 hours during the day. Vitor Yuan was also encouraged to stay well-hydrated.     A repeat nutrition panel (CBC, CMP, magnesium, ferritin, folate, vitamin B12, vitamin D 25 [place]) was recommended.  Baseline testing was completed on 3/30/2022, the results are summarized above.       Repeat testing was completed on 5/17/2022, her results included: H/H 11.6/36.5, MCV 93.1, platelet count 793,171, potassium 4.3, creatinine 0.7, calcium 9.4, ALT 15, AST 16, magnesium 1.9, ferritin 21, folate 18.7, vitamin B12 464, vitamin D 30, TSH 2.97, free T4 1.04, free T3 2.5, TPO negative, uric acid 3.5, , urine protein creatinine ratio 0.1 urine culture negative.   --Additional recommendations are below     The patient was previously provided with an order for Pepcid, 20 mg twice daily, and Zofran, 4 mg every 8 hours as needed for nausea.  She was counseled to call and/or return with worsening symptoms.     12. Constipation, improving -- The patient previously had complaints of constipation.    She reports that her symptoms are improving somewhat. She was again counseled to stay well hydrated and increase fiber, fruit, and vegetable intake.  Increased fiber intake was encouraged.  She can use colace daily. A prescription was previously provided. She can use a gentle laxative as needed.  She can also use a probiotic as needed.  Precautions were reviewed.     13.  Dolichocephaly, resolved-- Today's ultrasound was reviewed with the patient.  The cephalic index was again normal at 75%.     Previously, at 22 weeks 4 days, there was dolichocephaly in that the cephalic index PB STMTY 17% (normal >75%).  The patient was counseled that there is concern for dolichocephaly given the cephalic index is <73%.  This generally indicates that at this time, the fetal head is longer than it is wide.  The fetal head shape varies during gestation and can be affected by factors such as presentation (more common with breech presentation), the amniotic fluid level, and having a multiple gestation. Additionally, it can be a normal variant (if mild). If the condition persists, it may be suggestive of a genetic condition or syndrome and/or craniosynostosis.  The fetal head measurements will be reassessed at her follow up ultrasound.      14. Proteinuria -- The patient was previously noted to have trace protein on a urine dip. She was normotensive with a blood pressure of 109/73 and she denied any signs or symptoms of preeclampsia. She denied any signs or symptoms of a urinary tract infection. She was given orders to have a urine culture and urine P/C ratio. If any abnormalities are detected, she will be contacted with results and follow-up recommendations. Preeclampsia precautions were reviewed with the patient. The patient's preeclampsia panel was negative. The patient's urine culture was positive for E. coli.   See below.     15. Low vitamin D -- The patient's vitamin D was low at 30 on 5/17/2022  --Recommend vitamin D3 1000 IU daily  --Monitor levels serially  --Monitor nutrition panel q4-6 weeks (CBC, CMP, magnesium, ferritin, folate, vitamin B12, vitamin D25OH)    --Repeat testing completed on 6/27/2022, results included: H/H11.6/35.3, MCV 9.5, PT/INR 1.3/1.1, APTT 27.5, potassium 3.7, creatinine 0.7, calcium 9, ALT 11, AST 14, magnesium 2.1, ferritin 25, folate >20, vitamin B12 458, vitamin D 37, , uric acid 3.3, TSH 1.47, free T4 0.97, free T3 2.2, TPO negative, hemoglobin A1c 4.5%, urinalysis concerning for infection, urine protein creatinine ratio 0.2, urine culture positive for E. Coli. --The patient's vitamin D improved. She was counseled to continue her vitamin D supplement. --Recommend repeat testing in 4 to 6 weeks, on/after 7/25/2022  --Follow up with PCP for long term monitoring and management     16. Low ferritin -- The patient's ferritin was low at 21 (considered low if <15 in absence of anemia or <40 in setting of anemia)  --Ferrous sulfate 325 mg BID prescribed  --Monitor levels serially  --Monitor nutrition panel q4-6 weeks (CBC, CMP, magnesium, ferritin, folate, vitamin B12, vitamin D25OH)    --Repeat testing completed 6/27/2022, the patient's ferritin level increased to 25. Her H/H was stable at 11.6/35.3.  --She was counseled to contact length  --Recommend repeat testing in 4 to 6 weeks, on/after 7/25/2022  --Follow up with PCP for long term monitoring and management    17. Elevated Glucola -- The patient's labs were reviewed. Her repleted at 145. A copy of this test was ordered for the patient.     18. Vaginal irritation -- The patient had complaints of vaginal itching and burning. She reports that her symptoms started on Sunday, June 26. She denied having any significant changes in her discharge. Sterile speculum exam was completed. Copious discharge was noted.   A genital culture was collected and sent to the lab. The patient was counseled to use external treatments for itching until the genital culture is resulted. 19.  Bacteriuria -- The patient's urine culture from 6/27/2022 was positive for E. coli. The results were available following the patient's consultation. During her consultation, she had complaints of dizziness, prescription for Macrobid was provided. Sensitivities were reviewed. The culture was sensitive to Macrobid. Infection precautions were reviewed with the patient. --I requested the patient return for a follow-up assessment in 3 weeks unless there is a clinical reason for her to return prior to that time. She is to call if she has any problems or questions prior to her next visit. Further evaluation and management will be dependent on her clinical presentation and the results of her testing. --The patient was advised to call if she has any increased vaginal discharge, vaginal bleeding, contractions, abdominal pain, back pain or any new significant symptomatology prior to her next visit. I advised her that these are signs and symptoms of cervical change and require follow-up assessment when they occur. Preeclampsia precautions were also reviewed with the patient. --The patient was also counseled to call and/or return with any concerns for decreased fetal activity. --The patient is to continue to follow with you in your office for ongoing obstetric care. --The total time spent on today's visit was 40 minutes. This included preparation for the visit (i.e. reviewing prior external notes and test results), performance of a medically appropriate history and examination, counseling, orders for medications, tests or other procedures, and coordination of care. Greater than 50% of the time was spent face-to-face with the patient. This time is exclusive of procedures performed. I answered all of  the patient's questions to her satisfaction.  I asked her to call if she had any additional questions prior to her next visit. --At the conclusion of the visit, the patient appeared to have a good understanding of the issues discussed. I answered all of her questions to her satisfaction. I asked her to call if she had any additional questions prior to her next visit. --Thank you for allowing me to participate in the care of this pleasant patient. Please don't hesitate to call me if you have any questions. Sincerely,      Keny Li MD, [address]  [phone]      *All or parts of this note may have been generated using a voice recognition program. There may be typo, grammar, or Word substitution errors that have escaped my review of this note.

## 2022-06-28 NOTE — PROGRESS NOTES
Pt here for pregnancy ultrasound  Pt denies any cramping/bleeding or lof  Pt c/o urinary tract symptoms and had labs and urine done yesterday  Pt states good fetal movement

## 2022-06-28 NOTE — PROGRESS NOTES
2022      Kajalsurekha Lopez, [address] [facility]  [address]     RE:  Jay Augustine  : 1980   AGE: 46 y.o. This report has been created using voice recognition software. It may contain errors which are inherent in voice recognition technology. Dear Dr. Case Magana:      I had the pleasure of meeting with Ms. Mcfarlane for a return consultation. As you know, Ms. Brendon Olmos  is a 46 y.o.  at 28w4d ([address]) who is being followed by our office for multiple medical issues. Today, Ms. Mcfarlane reports that she feels well. She notes good fetal movement and denies any symptoms of leaking of fluid, vaginal bleeding, and/or contractions. She had a fetal ultrasound that was notable for the following. There is a single intrauterine gestation in a cephalic presentation with a heart rate of 139 beats per minute. The placenta is anterior. The amniotic fluid index is 17.1 cm. The composite gestational age is 33w3d. The estimated fetal weight is at the 53rd percentile. BPP 8/8. Umbilical artery Doppler studies are normal.  MCA PSV normal.      PERTINENT PHYSICAL EXAMINATION:   /70   Pulse 91   Wt 173 lb 4 oz (78.6 kg)   LMP 2021   BMI 31.69 kg/m²     Urine dipstick:   Negative for Glucose    Negative for Albumin      A fetal ultrasound assessment was performed today. A report is enclosed for your review.     Assessment & Plan:  46 y.o.  at 28w4d ([address]) with:    1.  Pregnancy dating -- The patient's pregnancy dating was previously reviewed.  The patient reported that she had her Mirena removed on 2021.  She was tracking her periods.  Per the vickie she was using, she was having 25-day cycles.  She reported a last menstrual period of 2021. [facility], she had 2 periods in December. Sharon Guzman first was on 2021.  This would have been a 16-day cycle.     The patient's initial ultrasound was reviewed. Edgard Grant on her last menstrual period, she was 13 weeks 2 days, SHARA 9/24/2022.  Based on the ultrasound, she was 14 weeks 3 days, SHARA 9/16/2022.     There was an 8-day difference between dating by her LMP and the patient's initial ultrasound. Given there was greater than a 7-day discrepancy, the recommendation was made to use the 14-week ultrasound for dating. Dairl Dose is 9/16/2022 based on a 14-week ultrasound.     The patient returned for an anatomy ultrasound.  She was 19 weeks 3 days by her 14-week ultrasound.  The composite gestational age was 22 weeks 5 days.  Fetal growth was appropriate for the gestational age based on her 14-week ultrasound.     The patient returns at 22 weeks 4 days.  The composite gestational age was 25 weeks gestation and again agreed with dating by her 14-week ultrasound.     The patient returned at 25 weeks 5 days. Fetal growth was again appropriate for the gestational age based on dating by her 14-week ultrasound. The patient returns today at 28 weeks 4 days. Fetal growth was again appropriate for the gestational age based on her 14-week ultrasound.     The patient was scheduled to return in 3 weeks to reevaluate fetal growth.     2.  Advanced maternal age  -- The patient was previously counseled regarding the implications of advanced maternal age in pregnancy, specifically that of aneuploidy.     Counseling was reviewed.  The patient did not have any additional questions or concerns.     The patient previously completed screening with NIPT. Bello William results were available for review. [employer] fetal fraction was 9% and the results were low risk for aneuploidy.  Per the report, the fetus is female. [employer] genetic screening results were reviewed with the patient.  The patient declined any additional diagnostic testing.     Since the patient had early screening with NIPT, a maternal serum AFP is recommended between 15 and 22 weeks to screen for open neural tube defects.  A maternal serum AFP was normal at 1.02 MOM.     Again, recent studies have reported that there may be an increased risk for fetal loss in the setting of advanced maternal age. Christos Boss, increased surveillance is recommended.  I recommend monitoring fetal growth serially, every 4 weeks beginning at 24-26 weeks' gestation. Jose Finch should monitor fetal kick counts daily starting at 28 weeks' gestation.  At 36 weeks' gestation, she should be scheduled for a weekly non stress test or biophysical profile. I recommend delivery at 44 week's gestation.     Given there is an increased risk for hypertensive disorders of pregnancy in the setting of advanced maternal age, the possible utility of a low dose aspirin in reducing her risk for hypertensive disorders of pregnancy was reviewed.  The risks and benefits of low dose aspirin were discussed. Jose Finch was counseled to continue taking a daily low-dose aspirin for the remainder of pregnancy and 6 to 8 weeks postpartum.     3. History of  X3 -- The patient's first pregnancy was delivered via  secondary to a breech presentation.  She then had a 2 repeat C-sections.  The placenta is anterior and above the lower uterine segment. The lower uterine segment appears thin on ultrasound.   The appearance will be monitored on follow-up ultrasound.  She plans to have a repeat  for delivery.      4. History of anemia -- The patient reported that her prior pregnancies were complicated by anemia.  Secondary to this history, a baseline nutrition panel and thyroid function studies were recommended.       Testing was completed on 3/30/2022, her results included: H/H12.1/35.6, MCV 87, platelet count 234,985, potassium 3.8, creatinine 0.7, calcium 9.2, ALT 20, AST 18, ferritin 28, folate >20, vitamin B12 591, vitamin D 31, magnesium 1.9, TSH 2.9, free T4 1.19, free T3 3, TPO negative, antithyroglobulin antibody negative, hemoglobin A1c 4.8%, urinalysis negative, urine culture negative, urine protein creatinine ratio 0.1.  --Additional recommendations are below.     5.  History of macrosomia -- The patient's third child weighed 9 pounds 1 ounce at 39 weeks gestation. Sherri Juarez had a repeat  for delivery. She denies having a history of gestational diabetes. Because of this history, she is at risk for having another child with macrosomia.        Fetal growth should be monitored serially, every 3 to 4 weeks starting at 24 to 26 weeks gestation.     Fetal growth was appropriate for the gestational age today at 29 weeks 4 days.     A baseline hemoglobin A1c was checked on 3/30/2022. Nimco Leon was normal at 4.8%.     The patient had a baseline Glucola on 3/8/2022 that was normal at 128.  She should have repeat screening at 24 to 28 weeks gestation.     6.  Work exposures -- The patient previously indicated that she works as an [employer]. Sherri Juarez was counseled that she may be at increased risk for viral exposures such as CMV and parvovirus.       She was given orders to have baseline screening for these viruses.    Baseline screening was completed on 3/30/2022. [employer]  results included: CMV IgG positive/IgM negative, parvovirus IgG positive/IgM negative.  The results indicate past exposure to CMV and immunity to parvovirus.  The results were previously reviewed with the patient.     Precautions were reviewed.     7.  Family history of bleeding disorder -- The patient reported that her nephew (sister's son) has some type of bleeding disorder. Sherri Juarez was unsure of the name of the condition.  She was encouraged to obtain additional information regarding her nephew's condition.       The patient previously reported that her nephew has an issue with factor V.  She was unsure if he has factor V Leiden or factor V deficiency.  She states that the condition was inherited from his father. Janey Turner patient reports that her sister was tested and does not have this condition.     Previously, the patient reported that her nephew has a the factor V Leiden gene mutation. He actually has a clotting disorder. Again, he inherited this condition from his father. The patient's sister is not a carrier.     The patient was counseled that if her sister does not have this condition or gene, that I would not anticipate any increased risk for the patient or her child.       8.  Family history of thyroid disease -- The patient's family history is notable for thyroid disease in her mother. Francois Manriquez this family history, baseline thyroid function studies and a screening thyroid peroxidase antibody/antithyroglobulin antibody are recommended.     The patient completed baseline screening on 3/30/2022, her results included: TSH 2.9, free T4 1.19, free T3 3, TPO negative, antithyroglobulin antibody negative.  The results were reviewed with the patient.     9.  History of ovarian cyst -- The patient reported a history of an ovarian cyst.  She was unsure which side the cyst was on.  She was supposed to have follow-up with her primary OB/GYN but did not have the follow-up imaging.      The ovaries were evaluated again today and appeared normal bilaterally.  No cystic structures were seen in the adnexa.  The reassuring findings were reviewed with the patient.     10.  Vaccination in pregnancy -- The patient was  previously counseled regarding the recommendations for vaccination in pregnancy.  Counseling was reviewed.  The patient did not have any additional questions or concerns.     The patient was counseled regarding the recommendations for the Tdap vaccination in pregnancy. Risks and benefits were discussed. The vaccination is typically administered between 27 and 36 weeks gestation and recommended to confer protection against whooping cough to the .  The patient plans to discuss this with her primary provider at her next visit.  The patient was also counseled that her partner in any individuals who will be in close contact with the  should also be vaccinated for whooping cough.     The patient was counseled regarding recommendation for the flu vaccination in pregnancy for both maternal and infant safety.  Risks and benefits were reviewed. Ana M Lorenzana was encouraged to have this vaccination as an outpatient. --She received a flu vaccination     Counseling was provided regarding the recommendation for the Covid vaccination in pregnancy.  I advised the patient that the [facility] of Obstetrics and Gynecology and The Society for Maternal Fetal Medicine recommend that all pregnant women be vaccinated for COVID-19.  \"Data have shown that COVID-19 infection puts pregnant people at increased risk of severe complications and even death; yet only about 22% of pregnant individuals have received 1 more doses of COVID-19 vaccine according to the [employer] for Disease Control and Prevention. \"     \" Recent data have shown that more than 95% of those were hospitalized and/or dying from COVID-19 are those who have remained unvaccinated.  Pregnant individuals who have decided to wait until after delivery to be vaccinated may be inadvertently exposing themselves to an increased risk of severe illness or death. \"      \" COVID-19 vaccination is the best testing to reduce maternal and fetal complications of PFWDX-21 infection among pregnant people,\" said Magi Heath MD, president of the Society for Maternal Fetal Medicine, sub-specialists.     The patient was counseled that in the event she opts not to have the Covid vaccination, she should alert her provider immediately if she test positive for Covid.  Pregnant women are on the priority list for treatment with monoclonal antibody.  This intervention has been shown to decrease the risk for hospitalization and complications related to Covid in pregnancy.   --She received a Covid vaccination     The patient expressed verbal understanding of this counseling.      11.   Nausea and vomiting of pregnancy, improving  -- The patients reports that her nausea and vomiting symptoms are significantly improved.  She has gained an additional 2 pounds since her last visit.  She still has occasional symptoms of nausea.  She denies any signs of symptoms of dehydration.  Precautions were reviewed.       The patient was encouraged to eat small amounts of food every 2-3 hours during the day. [employer] was also encouraged to stay well-hydrated.     A repeat nutrition panel (CBC, CMP, magnesium, ferritin, folate, vitamin B12, vitamin D 25 [place]) was recommended.  Baseline testing was completed on 3/30/2022, the results are summarized above.       Repeat testing was completed on 5/17/2022, her results included: H/H 11.6/36.5, MCV 93.1, platelet count 710,838, potassium 4.3, creatinine 0.7, calcium 9.4, ALT 15, AST 16, magnesium 1.9, ferritin 21, folate 18.7, vitamin B12 464, vitamin D 30, TSH 2.97, free T4 1.04, free T3 2.5, TPO negative, uric acid 3.5, , urine protein creatinine ratio 0.1 urine culture negative. --Additional recommendations are below     The patient was previously provided with an order for Pepcid, 20 mg twice daily, and Zofran, 4 mg every 8 hours as needed for nausea.  She was counseled to call and/or return with worsening symptoms.     12. Constipation, improving -- The patient previously had complaints of constipation.    She reports that her symptoms are improving somewhat. She was again counseled to stay well hydrated and increase fiber, fruit, and vegetable intake.  Increased fiber intake was encouraged.  She can use colace daily. A prescription was previously provided.  She can use a gentle laxative as needed.  She can also use a probiotic as needed.  Precautions were reviewed.     13.  Dolichocephaly, resolved-- Today's ultrasound was reviewed with the patient.  The cephalic index was again normal at 75%.     Previously, at 22 weeks 4 days, there was dolichocephaly in that the cephalic index [id number] JIATF 56% (normal >75%).  The patient was counseled that there is concern for dolichocephaly given the cephalic index is <79%.  This generally indicates that at this time, the fetal head is longer than it is wide.  The fetal head shape varies during gestation and can be affected by factors such as presentation (more common with breech presentation), the amniotic fluid level, and having a multiple gestation. Additionally, it can be a normal variant (if mild). If the condition persists, it may be suggestive of a genetic condition or syndrome and/or craniosynostosis.  The fetal head measurements will be reassessed at her follow up ultrasound.      14. Proteinuria -- The patient was previously noted to have trace protein on a urine dip. She was normotensive with a blood pressure of 109/73 and she denied any signs or symptoms of preeclampsia. She denied any signs or symptoms of a urinary tract infection. She was given orders to have a urine culture and urine P/C ratio. If any abnormalities are detected, she will be contacted with results and follow-up recommendations. Preeclampsia precautions were reviewed with the patient. The patient's preeclampsia panel was negative. The patient's urine culture was positive for E. coli. See below.     15. Low vitamin D -- The patient's vitamin D was low at 30 on 5/17/2022  --Recommend vitamin D3 1000 IU daily  --Monitor levels serially  --Monitor nutrition panel q4-6 weeks (CBC, CMP, magnesium, ferritin, folate, vitamin B12, vitamin D25OH)    --Repeat testing completed on 6/27/2022, results included: H/H11.6/35.3, MCV 9.5, PT/INR 1.3/1.1, APTT 27.5, potassium 3.7, creatinine 0.7, calcium 9, ALT 11, AST 14, magnesium 2.1, ferritin 25, folate >20, vitamin B12 458, vitamin D 37, , uric acid 3.3, TSH 1.47, free T4 0.97, free T3 2.2, TPO negative, hemoglobin A1c 4.5%, urinalysis concerning for infection, urine protein creatinine ratio 0.2, urine culture positive for E. Coli. --The patient's vitamin D improved.   She was counseled to continue her vitamin D supplement. --Recommend repeat testing in 4 to 6 weeks, on/after 7/25/2022  --Follow up with PCP for long term monitoring and management     16. Low ferritin -- The patient's ferritin was low at 21 (considered low if <15 in absence of anemia or <40 in setting of anemia)  --Ferrous sulfate 325 mg BID prescribed  --Monitor levels serially  --Monitor nutrition panel q4-6 weeks (CBC, CMP, magnesium, ferritin, folate, vitamin B12, vitamin D25OH)    --Repeat testing completed 6/27/2022, the patient's ferritin level increased to 25. Her H/H was stable at 11.6/35.3.  --She was counseled to contact length  --Recommend repeat testing in 4 to 6 weeks, on/after 7/25/2022  --Follow up with PCP for long term monitoring and management    17. Elevated Glucola -- The patient's labs were reviewed. Her repleted at 145. A copy of this test was ordered for the patient.     18. Vaginal irritation -- The patient had complaints of vaginal itching and burning. She reports that her symptoms started on Sunday, June 26. She denied having any significant changes in her discharge. Sterile speculum exam was completed. Copious discharge was noted. A genital culture was collected and sent to the lab. The patient was counseled to use external treatments for itching until the genital culture is resulted. 19.  Bacteriuria -- The patient's urine culture from 6/27/2022 was positive for E. coli. The results were available following the patient's consultation. During her consultation, she had complaints of dizziness, prescription for Macrobid was provided. Sensitivities were reviewed. The culture was sensitive to Macrobid. Infection precautions were reviewed with the patient. --I requested the patient return for a follow-up assessment in 3 weeks unless there is a clinical reason for her to return prior to that time. She is to call if she has any problems or questions prior to her next visit. Further evaluation and management will be dependent on her clinical presentation and the results of her testing. --The patient was advised to call if she has any increased vaginal discharge, vaginal bleeding, contractions, abdominal pain, back pain or any new significant symptomatology prior to her next visit. I advised her that these are signs and symptoms of cervical change and require follow-up assessment when they occur. Preeclampsia precautions were also reviewed with the patient. --The patient was also counseled to call and/or return with any concerns for decreased fetal activity. --The patient is to continue to follow with you in your office for ongoing obstetric care. --The total time spent on today's visit was 40 minutes. This included preparation for the visit (i.e. reviewing prior external notes and test results), performance of a medically appropriate history and examination, counseling, orders for medications, tests or other procedures, and coordination of care. Greater than 50% of the time was spent face-to-face with the patient. This time is exclusive of procedures performed. I answered all of  the patient's questions to her satisfaction. I asked her to call if she had any additional questions prior to her next visit. --At the conclusion of the visit, the patient appeared to have a good understanding of the issues discussed. I answered all of her questions to her satisfaction. I asked her to call if she had any additional questions prior to her next visit. --Thank you for allowing me to participate in the care of this pleasant patient. Please don't hesitate to call me if you have any questions.       Sincerely,      Tal Granger MD, [address]  [phone]      *All or parts of this note may have been generated using a voice recognition program. There may be typo, grammar, or Word substitution errors that have escaped my review of this note.

## 2022-06-30 ENCOUNTER — TELEPHONE (OUTPATIENT)
Dept: OBGYN CLINIC | Age: 42
End: 2022-06-30

## 2022-06-30 DIAGNOSIS — B37.9 YEAST INFECTION: Primary | ICD-10-CM

## 2022-06-30 RX ORDER — MICONAZOLE NITRATE 1200MG-2%
1 KIT VAGINAL DAILY
Qty: 1 KIT | Refills: 0 | Status: ON HOLD
Start: 2022-06-30 | End: 2022-09-09 | Stop reason: HOSPADM

## 2022-06-30 NOTE — TELEPHONE ENCOUNTER
Pt returned call and informed of Dr Crystal Chaparro recommendations.   She verbalized understanding

## 2022-07-01 LAB
ORGANISM: ABNORMAL
URINE CULTURE, ROUTINE: ABNORMAL

## 2022-07-02 LAB
GENITAL CULTURE, ROUTINE: ABNORMAL
GENITAL CULTURE, ROUTINE: ABNORMAL
ORGANISM: ABNORMAL
THYROID PEROXIDASE (TPO) ABS: <4 IU/ML (ref 0–25)

## 2022-07-05 ENCOUNTER — TELEPHONE (OUTPATIENT)
Dept: OBGYN CLINIC | Age: 42
End: 2022-07-05

## 2022-07-05 NOTE — TELEPHONE ENCOUNTER
I left message for Efren Benites to call me back at [facility] for Dr. Jhonny Sepulveda  to see if her symptoms are improving with the macrobid from her UTI.

## 2022-07-06 ENCOUNTER — TELEPHONE (OUTPATIENT)
Dept: OBGYN CLINIC | Age: 42
End: 2022-07-06

## 2022-07-06 NOTE — TELEPHONE ENCOUNTER
Cas Tse called back and let me know that is feeling better and her symptoms are improved from her urinary tract infection. I will let Dr. Joce Dale know.

## 2022-07-06 NOTE — TELEPHONE ENCOUNTER
Message left with Tiana Murguia to check to see if symptoms are improving on macrobid teodora Dr. Cody Burkitt.   Message left for patient to call office back

## 2022-07-11 PROBLEM — N89.8 VAGINAL IRRITATION: Status: ACTIVE | Noted: 2022-07-11

## 2022-07-11 PROBLEM — R30.0 DYSURIA: Status: ACTIVE | Noted: 2022-07-11

## 2022-07-11 PROBLEM — R82.71 BACTERIURIA DURING PREGNANCY IN THIRD TRIMESTER: Status: ACTIVE | Noted: 2022-07-11

## 2022-07-11 PROBLEM — O99.891 BACTERIURIA DURING PREGNANCY IN THIRD TRIMESTER: Status: ACTIVE | Noted: 2022-07-11

## 2022-07-13 ENCOUNTER — HOSPITAL ENCOUNTER (OUTPATIENT)
Age: 42
Discharge: HOME | End: 2022-07-13
Payer: COMMERCIAL

## 2022-07-13 ENCOUNTER — ROUTINE PRENATAL (OUTPATIENT)
Dept: OBGYN | Age: 42
End: 2022-07-13
Payer: COMMERCIAL

## 2022-07-13 VITALS
DIASTOLIC BLOOD PRESSURE: 72 MMHG | BODY MASS INDEX: 31.46 KG/M2 | SYSTOLIC BLOOD PRESSURE: 126 MMHG | WEIGHT: 172 LBS | HEART RATE: 101 BPM

## 2022-07-13 DIAGNOSIS — Z3A.30 30 WEEKS GESTATION OF PREGNANCY: Primary | ICD-10-CM

## 2022-07-13 LAB
GLUCOSE TOLERANCE TEST 1 HOUR: 171 MG/DL
GLUCOSE TOLERANCE TEST 2 HOUR: 121 MG/DL
GLUCOSE TOLERANCE TEST FASTING: 85 MG/DL
GLUCOSE URINE, POC: NEGATIVE
PROTEIN UA: POSITIVE

## 2022-07-13 PROCEDURE — 36415 COLL VENOUS BLD VENIPUNCTURE: CPT

## 2022-07-13 PROCEDURE — 81002 URINALYSIS NONAUTO W/O SCOPE: CPT | Performed by: OBSTETRICS & GYNECOLOGY

## 2022-07-13 PROCEDURE — 86592 SYPHILIS TEST NON-TREP QUAL: CPT

## 2022-07-13 PROCEDURE — G8427 DOCREV CUR MEDS BY ELIG CLIN: HCPCS | Performed by: OBSTETRICS & GYNECOLOGY

## 2022-07-13 PROCEDURE — 90715 TDAP VACCINE 7 YRS/> IM: CPT | Performed by: OBSTETRICS & GYNECOLOGY

## 2022-07-13 PROCEDURE — 99212 OFFICE O/P EST SF 10 MIN: CPT | Performed by: OBSTETRICS & GYNECOLOGY

## 2022-07-13 PROCEDURE — G8419 CALC BMI OUT NRM PARAM NOF/U: HCPCS | Performed by: OBSTETRICS & GYNECOLOGY

## 2022-07-13 PROCEDURE — 82951 GLUCOSE TOLERANCE TEST (GTT): CPT

## 2022-07-13 PROCEDURE — 1036F TOBACCO NON-USER: CPT | Performed by: OBSTETRICS & GYNECOLOGY

## 2022-07-13 PROCEDURE — 99213 OFFICE O/P EST LOW 20 MIN: CPT | Performed by: OBSTETRICS & GYNECOLOGY

## 2022-07-13 RX ORDER — DOCUSATE SODIUM 100 MG/1
CAPSULE, LIQUID FILLED ORAL
COMMUNITY
Start: 2022-07-05

## 2022-07-13 NOTE — PATIENT INSTRUCTIONS
Patient Education        Tdap (Tetanus, Diphtheria, Pertussis) Vaccine: What You Need to Know  Why get vaccinated? Tdap vaccine can prevent tetanus, diphtheria, and pertussis. Diphtheria and pertussis spread from person to person. Tetanus enters the bodythrough cuts or wounds.  TETANUS (T) causes painful stiffening of the muscles. Tetanus can lead to serious health problems, including being unable to open the mouth, having trouble swallowing and breathing, or death.  DIPHTHERIA (D) can lead to difficulty breathing, heart failure, paralysis, or death.  PERTUSSIS (aP), also known as \"whooping cough,\" can cause uncontrollable, violent coughing that makes it hard to breathe, eat, or drink. Pertussis can be extremely serious especially in babies and young children, causing pneumonia, convulsions, brain damage, or death. In teens and adults, it can cause weight loss, loss of bladder control, passing out, and rib fractures from severe coughing. Tdap vaccine  Tdap is only for children 7 years and older, adolescents, and adults. Adolescents should receive a single dose of Tdap, preferably at age 6 or 15 years. Pregnant people should get a dose of Tdap during every pregnancy, preferably during the early part of the third trimester, to help protect the  from pertussis. Infants are most at risk for severe, life-threatening complications frompertussis. Adults who have never received Tdap should get a dose of Tdap. Also, adults should receive a booster dose of either Tdap or Td (a different vaccine that protects against tetanus and diphtheria but not pertussis) every 10 years, or after 5 years in the case of a severe or dirty wound or burn. Tdap may be given at the same time as other vaccines.   Talk with your health care provider  Tell your vaccination provider if the person getting the vaccine:   Has had an allergic reaction after a previous dose of any vaccine that protects against tetanus, diphtheria, or pertussis, or has any severe, life-threatening allergies   Has had a coma, decreased level of consciousness, or prolonged seizures within 7 days after a previous dose of any pertussis vaccine (DTP, DTaP, or Tdap)   Has seizures or another nervous system problem   Has ever had Guillain-Barré Syndrome (also called \"GBS\")   Has had severe pain or swelling after a previous dose of any vaccine that protects against tetanus or diphtheria  In some cases, your health care provider may decide to postpone Tdapvaccination until a future visit. People with minor illnesses, such as a cold, may be vaccinated. People who are moderately or severely ill should usually wait until they recover beforegetting Tdap vaccine. Your health care provider can give you more information. Risks of a vaccine reaction   Pain, redness, or swelling where the shot was given, mild fever, headache, feeling tired, and nausea, vomiting, diarrhea, or stomachache sometimes happen after Tdap vaccination. People sometimes faint after medical procedures, including vaccination. Tellyour provider if you feel dizzy or have vision changes or ringing in the ears. As with any medicine, there is a very remote chance of a vaccine causing asevere allergic reaction, other serious injury, or death. What if there is a serious problem? An allergic reaction could occur after the vaccinated person leaves the clinic. If you see signs of a severe allergic reaction (hives, swelling of the face and throat, difficulty breathing, a fast heartbeat, dizziness, or weakness), call 9-1-1 and get the person to the nearest hospital.  For other signs that concern you, call your health care provider. Adverse reactions should be reported to the Vaccine Adverse Event Reporting System (VAERS). Your health care provider will usually file this report, or you can do it yourself. Visit the VAERS website at www.vaers. hhs.gov or call 0-757.218.5534.  VAERS is only for reporting reactions, and [facility] staff members do not give medical advice. The National Vaccine Injury Compensation Program  The National Vaccine Injury Compensation Program (VICP) is a federal program that was created to compensate people who may have been injured by certain vaccines. Claims regarding alleged injury or death due to vaccination have a time limit for filing, which may be as short as two years. Visit the VICP website at www.[facility].gov/vaccinecompensation or call 3-419.837.8677 to learn about the program and about filing a claim. How can I learn more?  Ask your health care provider.  Call your local or state health department.  Visit the website of the Food and Drug Administration (FDA) for vaccine package inserts and additional information at www.fda.gov/vaccines-blood-biologics/vaccines.  Contact the Centers for Disease Control and Prevention (CDC):  ? Call 2-211.931.7941 (1-800-CDC-INFO) or  ? Visit CDC's website at www.cdc.gov/vaccines. Vaccine Information Statement  Tdap (Tetanus, Diphtheria, Pertussis) Vaccine  8/6/2021  42 . [facility] [id number]  [facility] for Disease Control and Prevention  Many vaccine information statements are available in Sinhala and other languages. See www.immunize.org/vis  Hojas de información sobre vacunas están disponibles en español y en muchos otros idiomas. Visite www.immunize.org/vis  Care instructions adapted under license by [facility] [place] ([facility]). If you have questions about a medical condition or this instruction, always ask your healthcare professional. [place] [address] any warranty or liability for your use of this information.

## 2022-07-13 NOTE — PROGRESS NOTES
SUBJECTIVE:   46 y.o. [id number] female here for routine OB appointment. Pt was stung by a bee, okay for benadryl. Good FM. No LOF, VB, ctx. 28 week labs reviewed. Pt failed 1 hr, passed 2 hour. Tdap today. F/u 2 weeks. Needs scheduled for c/s.    OB History    Para Term  AB Living   4 3 3     3   SAB IAB Ectopic Molar Multiple Live Births           0 3      # Outcome Date GA Lbr Jose Francisco/2nd Weight Sex Delivery Anes PTL Lv   4 Current            3 Term 02/18/15 39w0d  9 lb 1 oz (4.111 kg) M CS-LTranv Spinal N MACKENZIE   2 Term 06/10/11 38w0d  7 lb 3 oz (3.26 kg) M CS-LTranv Spinal N MACKENZIE   1 Term 04/15/05 40w0d  7 lb 3.5 oz (3.274 kg) F CS-LTranv Spinal N MACKENZIE       Past Medical History:   Diagnosis Date    Abnormal Pap smear of cervix     Depression     Postpartum depression         Past Surgical History:   Procedure Laterality Date     SECTION      HERNIA REPAIR      TONSILLECTOMY AND ADENOIDECTOMY      TYMPANOSTOMY TUBE PLACEMENT          Family History   Problem Relation Age of Onset    Hypertension Father     Diabetes Father    [facility] [de-identified] Abortions Mother         Social History     Tobacco History     Smoking Status  Former Smoker Quit date  2002    Smokeless Tobacco Use  Never Used          Alcohol History     Alcohol Use Status  Not Currently Comment  occ          Drug Use     Drug Use Status  Never          Sexual Activity     Sexually Active  Yes Partners  Male                  Current Outpatient Medications:     bisacodyl (DULCOLAX) 5 MG EC tablet, Take 5 mg by mouth daily as needed for Constipation, Disp: , Rfl:     ferrous sulfate (IRON 325) 325 (65 Fe) MG tablet, Take 1 tablet by mouth 2 times daily, Disp: 60 tablet, Rfl: 5    ondansetron (ZOFRAN) 4 MG tablet, Take 1 tablet by mouth every 8 hours as needed for Nausea or Vomiting, Disp: 40 tablet, Rfl: 0    aspirin (ASPIRIN 81) 81 MG chewable tablet, Take 1 tablet by mouth daily, Disp: 30 tablet, Rfl: 6    Miconazole Nitrate-Wipes (MONISTAT 7 COMPLETE THERAPY) 100-2 MG-% KIT, Place 1 suppository vaginally daily (Patient not taking: Reported on 2022), Disp: 1 kit, Rfl: 0    vitamin D3 (CHOLECALCIFEROL) 25 MCG (1000 UT) TABS tablet, Take 2 tablets by mouth daily, Disp: 60 tablet, Rfl: 5    famotidine (PEPCID) 20 MG tablet, Take 1 tablet by mouth 2 times daily (Patient not taking: Reported on 2022), Disp: 60 tablet, Rfl: 3    Prenatal Vit-Fe Fumarate-FA (PRENATAL VITAMIN PO), Take 1 tablet by mouth daily, Disp: , Rfl:     naproxen (NAPROSYN) 500 MG tablet, Take 1 tablet by mouth 2 times daily as needed for Pain (Patient not taking: Reported on 3/3/2022), Disp: 28 tablet, Rfl: 0    ibuprofen (IBU) 600 MG tablet, Take 1 tablet by mouth every 8 hours as needed for Pain., Disp: 20 tablet, Rfl: 0    traMADol (ULTRAM) 50 MG tablet, Take 50 mg by mouth every 6 hours as needed. (Patient not taking: Reported on 3/3/2022), Disp: , Rfl:     tamsulosin (FLOMAX) 0.4 MG capsule, Take 1 capsule by mouth daily for 30 days. , Disp: 30 capsule, Rfl: 0    ondansetron (ZOFRAN) 4 MG tablet, Take 1 tablet by mouth every 8 hours as needed for Nausea. (Patient not taking: Reported on 3/3/2022), Disp: 20 tablet, Rfl: 0     No Known Allergies     OBJECTIVE:   /72 (Position: Sitting)   Pulse (!) 101   Wt 172 lb (78 kg)   LMP 2021   BMI 31.46 kg/m²     Mother's Prenatal Vitals  BP: 126/72  Weight: 172 lb (78 kg)  Heart Rate: (!) 101      ASSESSMENT:   1Priyanka Scruggs is a 46 y.o. female  2. [id number]  3. 30w5d  4. Previous  section x3  5. AMA  6. History of fetal macrosomia  7. Elevated BP at beginning of pregnancy  8.  Dolichocephaly, resolved    Patient Active Problem List    Diagnosis Date Noted    Dysuria 2022     Priority: Medium    Vaginal irritation 2022     Priority: Medium    Bacteriuria during pregnancy in third trimester 2022     Priority: Medium    Urine protein increased 2022 Priority: Medium    Low ferritin 06/17/2022     Priority: Medium    Low vitamin D level 06/17/2022     Priority: Medium    Constipation 04/25/2022     Priority: Medium    Non-intractable vomiting 04/25/2022     Priority: Medium    19 weeks gestation of pregnancy 04/25/2022     Priority: Medium    BMI 30.0-30.9,adult 04/25/2022     Priority: Medium    Occupational exposure in workplace 03/25/2022    BMI 29.0-29.9,adult 03/25/2022    Multigravida of advanced maternal age in first trimester 03/25/2022    History of macrosomia in infant in prior pregnancy, currently pregnant in first trimester 03/25/2022    History of anemia 03/25/2022        Diagnosis Orders   1. 30 weeks gestation of pregnancy         PLAN:     No orders of the defined types were placed in this encounter. Return in about 2 weeks (around 7/27/2022) for Prenatal visit, continue NSTs twice weekly.       Electronically signed by Mel Garvey DO on 6/15/22

## 2022-07-13 NOTE — PROGRESS NOTES
Patient alert and pleasant with no complaints. Here today for prenatal visit. Fetal heart tones obtained without difficulty. Urine for glucose negativeand protein obtained with trace results. TDAP IM given left deltoid without difficulty   Discharge instructions have been discussed with the patient. Patient advised to call our office with any questions or concerns. Voiced understanding.

## 2022-07-14 LAB — RPR: NORMAL

## 2022-07-19 ENCOUNTER — ROUTINE PRENATAL (OUTPATIENT)
Dept: OBGYN CLINIC | Age: 42
End: 2022-07-19
Payer: COMMERCIAL

## 2022-07-19 ENCOUNTER — ANCILLARY PROCEDURE (OUTPATIENT)
Dept: OBGYN CLINIC | Age: 42
End: 2022-07-19
Payer: COMMERCIAL

## 2022-07-19 VITALS
SYSTOLIC BLOOD PRESSURE: 120 MMHG | DIASTOLIC BLOOD PRESSURE: 74 MMHG | HEART RATE: 86 BPM | BODY MASS INDEX: 31.86 KG/M2 | WEIGHT: 174.2 LBS

## 2022-07-19 DIAGNOSIS — R79.89 LOW VITAMIN D LEVEL: ICD-10-CM

## 2022-07-19 DIAGNOSIS — O09.291 HISTORY OF MACROSOMIA IN INFANT IN PRIOR PREGNANCY, CURRENTLY PREGNANT IN FIRST TRIMESTER: ICD-10-CM

## 2022-07-19 DIAGNOSIS — Z3A.31 31 WEEKS GESTATION OF PREGNANCY: Primary | ICD-10-CM

## 2022-07-19 DIAGNOSIS — Z86.2 HISTORY OF ANEMIA: ICD-10-CM

## 2022-07-19 DIAGNOSIS — N89.8 VAGINAL IRRITATION: ICD-10-CM

## 2022-07-19 DIAGNOSIS — R79.0 LOW FERRITIN: ICD-10-CM

## 2022-07-19 LAB
GLUCOSE URINE, POC: NEGATIVE
PROTEIN UA: NEGATIVE

## 2022-07-19 PROCEDURE — G8419 CALC BMI OUT NRM PARAM NOF/U: HCPCS | Performed by: OBSTETRICS & GYNECOLOGY

## 2022-07-19 PROCEDURE — 76816 OB US FOLLOW-UP PER FETUS: CPT | Performed by: OBSTETRICS & GYNECOLOGY

## 2022-07-19 PROCEDURE — 81002 URINALYSIS NONAUTO W/O SCOPE: CPT | Performed by: OBSTETRICS & GYNECOLOGY

## 2022-07-19 PROCEDURE — 1036F TOBACCO NON-USER: CPT | Performed by: OBSTETRICS & GYNECOLOGY

## 2022-07-19 PROCEDURE — 76819 FETAL BIOPHYS PROFIL W/O NST: CPT | Performed by: OBSTETRICS & GYNECOLOGY

## 2022-07-19 PROCEDURE — 76820 UMBILICAL ARTERY ECHO: CPT | Performed by: OBSTETRICS & GYNECOLOGY

## 2022-07-19 PROCEDURE — 76821 MIDDLE CEREBRAL ARTERY ECHO: CPT | Performed by: OBSTETRICS & GYNECOLOGY

## 2022-07-19 PROCEDURE — G8428 CUR MEDS NOT DOCUMENT: HCPCS | Performed by: OBSTETRICS & GYNECOLOGY

## 2022-07-19 PROCEDURE — 99213 OFFICE O/P EST LOW 20 MIN: CPT | Performed by: OBSTETRICS & GYNECOLOGY

## 2022-07-19 PROCEDURE — 99214 OFFICE O/P EST MOD 30 MIN: CPT | Performed by: OBSTETRICS & GYNECOLOGY

## 2022-07-19 NOTE — PROGRESS NOTES
2022      [facility], [address]  [place],  [address]     RE:  Kathrine Tse  : 1980   AGE: 46 y.o. This report has been created using voice recognition software. It may contain errors which are inherent in voice recognition technology. Dear Dr. Li Erm:      I had the pleasure of meeting with Ms. Mcfarlane for a return consultation. As you know, Ms. Brenda Phillips  is a 46 y.o. [id number] at 31w4d ([address]) who is being followed by our office for multiple medical issues. Today, Ms. Mcfarlane reports that she feels well. She notes good fetal movement and denies any symptoms of leaking of fluid, vaginal bleeding, and/or contractions. She had a fetal ultrasound that was notable for the following. There is a single intrauterine gestation in a cephalic presentation with a heart rate of 134 beats per minute. The placenta is anterior. The amniotic fluid index is 11.7 cm. The composite gestational age is 30w7d. The estimated fetal weight is at the 54th percentile. BPP 8/8. Umbilical artery Doppler studies are normal.  MCA PSV normal.  MCA PI normal.  CPR PI normal.      PERTINENT PHYSICAL EXAMINATION:   /74 (Position: Sitting)   Pulse 86   Wt 174 lb 3.2 oz (79 kg)   LMP 2021   BMI 31.86 kg/m²     Urine dipstick:   Negative for Glucose    Negative for Albumin      A fetal ultrasound assessment was performed today. A report is enclosed for your review. Assessment & Plan:  46 y.o.  at 31w4d ([address]) with:    1. Pregnancy dating -- The patient's pregnancy dating was previously reviewed. The patient reported that she had her Mirena removed on 2021. She was tracking her periods. Per the vickie she was using, she was having 25-day cycles. She reported a last menstrual period of 2021. However, she had 2 periods in December. Her first was on 2021. This would have been a 16-day cycle.      The patient's initial ultrasound was reviewed. Based on her last menstrual period, she was 13 weeks 2 days, SHARA 9/24/2022. Based on the ultrasound, she was 14 weeks 3 days, SHARA 9/16/2022. There was an 8-day difference between dating by her LMP and the patient's initial ultrasound. Given there was greater than a 7-day discrepancy, the recommendation was made to use the 14-week ultrasound for dating. Thus, her SHARA is 9/16/2022 based on a 14-week ultrasound. The patient returned for an anatomy ultrasound. She was 19 weeks 3 days by her 14-week ultrasound. The composite gestational age was 22 weeks 5 days. Fetal growth was appropriate for the gestational age based on her 14-week ultrasound. The patient returns at 22 weeks 4 days. The composite gestational age was 25 weeks gestation and again agreed with dating by her 14-week ultrasound. The patient returned at 25 weeks 5 days. Fetal growth was again appropriate for the gestational age based on dating by her 14-week ultrasound. The patient returned at 28 weeks 4 days. Fetal growth was again appropriate for the gestational age based on dating by her 14-week ultrasound. The patient returns today at 31 weeks 4 days. Fetal growth was again appropriate for the gestational age. There is a slight lag in the head circumference. Fetal growth will be reevaluated in 3 weeks. 2.  Advanced maternal age  -- The patient was previously counseled regarding the implications of advanced maternal age in pregnancy, specifically that of aneuploidy. Counseling was reviewed. The patient did not have any additional questions or concerns. The patient previously completed screening with NIPT. Her results were previously reviewed. The fetal fraction was 9% and the results were low risk for aneuploidy. Per the report, the fetus is female. The genetic screening results were reviewed with the patient. The patient declined any additional diagnostic testing.      Since the patient had early screening with NIPT, a maternal serum AFP is recommended between 15 and 22 weeks to screen for open neural tube defects. A maternal serum AFP was normal at 1.02 MOM. Again, recent studies have reported that there may be an increased risk for fetal loss in the setting of advanced maternal age. Thus, increased surveillance is recommended. I recommend monitoring fetal growth serially, every 4 weeks beginning at 24-26 weeks' gestation. She should monitor fetal kick counts daily starting at 28 weeks' gestation. At 36 weeks' gestation, she should be scheduled for a weekly non stress test or biophysical profile. I recommend delivery at 44 week's gestation. Given there is an increased risk for hypertensive disorders of pregnancy in the setting of advanced maternal age, the possible utility of a low dose aspirin in reducing her risk for hypertensive disorders of pregnancy was reviewed. The risks and benefits of low dose aspirin were discussed. She was counseled to continue taking a daily low-dose aspirin for the remainder of pregnancy and 6 to 8 weeks postpartum. 3. History of  X3 -- The patient's first pregnancy was delivered via  secondary to a breech presentation. She then had a 2 repeat C-sections. The placenta is anterior and above the lower uterine segment. The lower uterine segment appears thin on ultrasound. The appearance will be monitored on follow-up ultrasound. She plans to have a repeat  for delivery. 4. History of anemia -- The patient reported that her prior pregnancies were complicated by anemia. Secondary to this history, a baseline nutrition panel and thyroid function studies were recommended.        Testing was completed on 3/30/2022, her results included: H/H12.1/35.6, MCV 87, platelet count 923,032, potassium 3.8, creatinine 0.7, calcium 9.2, ALT 20, AST 18, ferritin 28, folate >20, vitamin B12 591, vitamin D 31, magnesium 1.9, TSH 2.9, free T4 1.19, free T3 3, TPO negative, antithyroglobulin antibody negative, hemoglobin A1c 4.8%, urinalysis negative, urine culture negative, urine protein creatinine ratio 0.1.  --Additional recommendations are below. 5.  History of macrosomia -- The patient's third child weighed 9 pounds 1 ounce at 39 weeks gestation. She had a repeat  for delivery. She denies having a history of gestational diabetes. Because of this history, she is at risk for having another child with macrosomia. Fetal growth should be monitored serially, every 3 to 4 weeks starting at 24 to 26 weeks gestation. Fetal growth was appropriate for the gestational age today at 34 weeks 4 days. A baseline hemoglobin A1c was checked on 3/30/2022. It was normal at 4.8%. The patient had a baseline Glucola on 3/8/2022 that was normal at 128. She should have repeat screening at 24 to 28 weeks gestation. 6.  Work exposures -- The patient previously indicated that she works as an [employer]. She was counseled that she may be at increased risk for viral exposures such as CMV and parvovirus. She was given orders to have baseline screening for these viruses. Baseline screening was completed on 3/30/2022. Her results included: CMV IgG positive/IgM negative, parvovirus IgG positive/IgM negative. The results indicate past exposure to CMV and immunity to parvovirus. The results were previously reviewed with the patient. Precautions were reviewed. 7.  Family history of bleeding disorder -- The patient reported that her nephew (sister's son) has some type of bleeding disorder. She was unsure of the name of the condition. She was encouraged to obtain additional information regarding her nephew's condition. The patient previously reported that her nephew has an issue with factor V. She was unsure if he has factor V Leiden or factor V deficiency. She states that the condition was inherited from his father.   The patient reports that her sister was tested and does not have this condition. Subsequently, the patient reported that her nephew has a the factor V Leiden gene mutation. He actually has a clotting disorder. Again, he inherited this condition from his father. The patient's sister does not have the factor V Leiden mutation. The patient was counseled that if her sister does not have this condition or gene, that I would not anticipate any increased risk for the patient or her child. 8.  Family history of thyroid disease -- The patient's family history is notable for thyroid disease in her mother. Given this family history, baseline thyroid function studies and a screening thyroid peroxidase antibody/antithyroglobulin antibody were recommended. The patient completed baseline screening on 3/30/2022, her results included: TSH 2.9, free T4 1.19, free T3 3, TPO negative, antithyroglobulin antibody negative. The results were previously reviewed with the patient. 9.  History of ovarian cyst -- The patient reported a history of an ovarian cyst.  She was unsure which side the cyst was on. She was supposed to have follow-up with her primary OB/GYN but did not have the follow-up imaging. The ovaries were evaluated previously and appeared normal bilaterally. No cystic structures were seen in the adnexa. The reassuring findings were reviewed with the patient. 10.  Vaccination in pregnancy -- The patient was  previously counseled regarding the recommendations for vaccination in pregnancy. Counseling was reviewed. The patient did not have any additional questions or concerns. The patient was counseled regarding the recommendations for the Tdap vaccination in pregnancy. Risks and benefits were discussed. The vaccination is typically administered between 27 and 36 weeks gestation and recommended to confer protection against whooping cough to the .  The patient plans to discuss this with her primary provider at her next visit. The patient was also counseled that her partner in any individuals who will be in close contact with the  should also be vaccinated for whooping cough. The patient was counseled regarding recommendation for the flu vaccination in pregnancy for both maternal and infant safety. Risks and benefits were reviewed. She was encouraged to have this vaccination as an outpatient. --She received a flu vaccination     Counseling was provided regarding the recommendation for the Covid vaccination in pregnancy. I advised the patient that the [facility] ([place], [place]) [facility] and The Society for Maternal Fetal Medicine recommend that all pregnant women be vaccinated for COVID-19. \"Data have shown that COVID-19 infection puts pregnant people at increased risk of severe complications and even death; yet only about 22% of pregnant individuals have received 1 more doses of COVID-19 vaccine according to the [employer] for Disease Control and Prevention. \"     \" Recent data have shown that more than 95% of those were hospitalized and/or dying from COVID-19 are those who have remained unvaccinated. Pregnant individuals who have decided to wait until after delivery to be vaccinated may be inadvertently exposing themselves to an increased risk of severe illness or death. \"     \" COVID-19 vaccination is the best testing to reduce maternal and fetal complications of FUXHF-88 infection among pregnant people,\" said Homer Deleon MD, president of the Society for Maternal Fetal Medicine, sub-specialists. The patient was counseled that in the event she opts not to have the Covid vaccination, she should alert her provider immediately if she test positive for Covid. Pregnant women are on the priority list for treatment with monoclonal antibody.   This intervention has been shown to decrease the risk for hospitalization and complications related to Covid in pregnancy. --She received a Covid vaccination     The patient expressed verbal understanding of this counseling. 11.   Nausea and vomiting of pregnancy, improving  -- The patients reports that her nausea and vomiting symptoms are significantly improved. She has gained an additional 1 pound since her last visit. She still has occasional symptoms of nausea. She denies any signs of symptoms of dehydration. Precautions were reviewed. The patient was again encouraged to eat small amounts of food every 2-3 hours during the day. He was also encouraged to stay well-hydrated. A nutrition panel (CBC, CMP, magnesium, ferritin, folate, vitamin B12, vitamin D 25 [place]) was recommended. Baseline testing was completed on 3/30/2022, the results are summarized above. Repeat testing was completed on 5/17/2022, her results included: H/H 11.6/36.5, MCV 93.1, platelet count 449,857, potassium 4.3, creatinine 0.7, calcium 9.4, ALT 15, AST 16, magnesium 1.9, ferritin 21, folate 18.7, vitamin B12 464, vitamin D 30, TSH 2.97, free T4 1.04, free T3 2.5, TPO negative, uric acid 3.5, , urine protein creatinine ratio 0.1 urine culture negative. --Additional recommendations are below     The patient was previously provided with an order for Pepcid, 20 mg twice daily, and Zofran, 4 mg every 8 hours as needed for nausea. She was counseled to call and/or return with worsening symptoms. 12. Constipation, improving -- The patient previously had complaints of constipation. She reports that her symptoms are significantly better. She was again counseled to stay well hydrated and increase fiber, fruit, and vegetable intake. Increased fiber intake was encouraged. She can use colace daily. A prescription was previously provided. She can use a gentle laxative as needed. She can also use a probiotic as needed. Precautions were reviewed.      13.  Dolichocephaly, resolved-- Today's ultrasound was reviewed with the patient. The cephalic index was again normal at 81%. Previously, at 22 weeks 4 days, there was dolichocephaly in that the cephalic index is again 29% (normal >75%). The patient was counseled that there is concern for dolichocephaly given the cephalic index is <20%. This generally indicates that at this time, the fetal head is longer than it is wide. The fetal head shape varies during gestation and can be affected by factors such as presentation (more common with breech presentation), the amniotic fluid level, and having a multiple gestation. Additionally, it can be a normal variant (if mild). If the condition persists, it may be suggestive of a genetic condition or syndrome and/or craniosynostosis. The fetal head measurements will be reassessed at her follow up ultrasound. 14.  Proteinuria -- The patient was previously noted to have trace protein on a urine dip. She was normotensive with a blood pressure of 109/73 and she denied any signs or symptoms of preeclampsia. She denied any signs or symptoms of a urinary tract infection. She was given orders to have a urine culture and urine P/C ratio. If any abnormalities are detected, she will be contacted with results and follow-up recommendations. Preeclampsia precautions were reviewed with the patient. The patient's preeclampsia panel was negative on 6/27/2022. The patient's urine culture was positive for E. coli. See below.      15.  Low vitamin D -- The patient's vitamin D was low at 30 on 5/17/2022  --Recommend vitamin D3 1000 IU daily  --Monitor levels serially  --Monitor nutrition panel q4-6 weeks (CBC, CMP, magnesium, ferritin, folate, vitamin B12, vitamin D25OH)     --Repeat testing completed on 6/27/2022, results included: H/H11.6/35.3, MCV 9.5, PT/INR 1.3/1.1, APTT 27.5, potassium 3.7, creatinine 0.7, calcium 9, ALT 11, AST 14, magnesium 2.1, ferritin 25, folate >20, vitamin B12 458, vitamin D 37, , uric acid 3.3, TSH 1.47, free T4 0.97, free T3 2.2, TPO negative, hemoglobin A1c 4.5%, urinalysis concerning for infection, urine protein creatinine ratio 0.2, urine culture positive for E. Coli. --The patient's vitamin D improved. She was counseled to continue her vitamin D supplement. --Recommend repeat testing in 4 to 6 weeks, on/after 7/25/2022  --Follow up with PCP for long term monitoring and management     16. Low ferritin -- The patient's ferritin was low at 21 (considered low if <15 in absence of anemia or <40 in setting of anemia)  --Ferrous sulfate 325 mg BID prescribed  --Monitor levels serially  --Monitor nutrition panel q4-6 weeks (CBC, CMP, magnesium, ferritin, folate, vitamin B12, vitamin D25OH)     --Repeat testing completed 6/27/2022, the patient's ferritin level increased to 25. Her H/H was stable at 11.6/35.3.  --She was counseled to continue her iron supplement  --Recommend repeat testing in 4 to 6 weeks, on/after 7/25/2022  --Follow up with PCP for long term monitoring and management     17. Elevated Glucola/normal 2-hour OGTT -- The patient's labs were reviewed. Her Glucola was elevated at 145 on 6/27/2022. A 2-hour oral glucose tolerance test was ordered for the patient. She completed testing on 7/13/2022 and her results were normal.  Her test results included: 85/171/121. 18.  Vaginal irritation/yeast infection, resolved -- The patient had complaints of vaginal itching and burning on 6/28/2022. She reported that her symptoms started on Sunday, June 26. She denied having any significant changes in her discharge. A genital culture was done at her visit. It was positive for yeast.  She was previously contacted and prescribed Monistat 7. The patient reports that her symptoms have improved. 19.  Bacteriuria, resolved -- The patient's urine culture from 6/27/2022 was positive for E. coli. The patient was symptomatic with dysuria during her visit on 6/28/2022. She was treated with Macrobid.   The culture was sensitive to Macrobid. The patient reports that her symptoms have resolved. --Recommend repeat urine culture with next of labs    20. Lagging HC -- The ultrasound was reviewed with the patient. The head circumference was at the 6th percentile and measuring at 29 weeks gestation. The intracranial anatomy otherwise appeared normal.  The transverse versus cerebellar diameter was at the 83rd percentile. Patient was counseled that this is likely constitutional.  The head size will be monitored serially on follow-up ultrasound. The patient reports that her oldest daughter also had a small head circumference. --The patient was scheduled to follow-up on 7/25/2022 for a nonstress test.  She should also have fetal testing during her visit with Dr. Renate Walsh on 7/28/2022. --The patient was scheduled to return in 2 weeks for a visit. She was scheduled for twice-weekly fetal testing. --I requested the patient return for a follow-up assessment in 1 week unless there is a clinical reason for her to return prior to that time. She is to call if she has any problems or questions prior to her next visit. Further evaluation and management will be dependent on her clinical presentation and the results of her testing. --The patient was advised to call if she has any increased vaginal discharge, vaginal bleeding, contractions, abdominal pain, back pain or any new significant symptomatology prior to her next visit. I advised her that these are signs and symptoms of cervical change and require follow-up assessment when they occur. Preeclampsia precautions were also reviewed with the patient. --The patient was also counseled to call and/or return with any concerns for decreased fetal activity. --The patient is to continue to follow with you in your office for ongoing obstetric care. --The total time spent on today's visit was 30 minutes.   This included preparation for the visit (i.e. reviewing prior external notes and test results), performance of a medically appropriate history and examination, counseling, orders for medications, tests or other procedures, and coordination of care. Greater than 50% of the time was spent face-to-face with the patient. This time is exclusive of procedures performed. I answered all of  the patient's questions to her satisfaction. I asked her to call if she had any additional questions prior to her next visit. --At the conclusion of the visit, the patient appeared to have a good understanding of the issues discussed. I answered all of her questions to her satisfaction. I asked her to call if she had any additional questions prior to her next visit. --Thank you for allowing me to participate in the care of this pleasant patient. Please don't hesitate to call me if you have any questions. Sincerely,      Rose Marie Alvarez MD, [facility] [address]  [phone]      *All or parts of this note may have been generated using a voice recognition program. There may be typo, grammar, or Word substitution errors that have escaped my review of this note.

## 2022-07-19 NOTE — LETTER
[facility] Maternal Fetal Medicine  [address] [facility] [address]  Phone: [phone]  Fax: [phone]           Aleksandr Keating MD      2022     Patient: Toan Herrera   MR Number: [id number]   YOB: 1980   Date of Visit: 2022       Dear Dr. Timbo Pltat:    Thank you for referring Toan Herrera to me for evaluation/treatment. Below are the relevant portions of my assessment and plan of care. If you have questions, please do not hesitate to call me. I look forward to following Shoshana Spurling along with you. Sincerely,        Aleksandr Keating MD    CC providers:  Jimbo Pope MD  [address]  [place] [place]       2022      Jimbo Pope MD  [address]  [facility],  [address] [place] [address]     RE:  Olga Dwyer  : 1980   AGE: 46 y.o. This report has been created using voice recognition software. It may contain errors which are inherent in voice recognition technology. Dear Dr. Timbo Platt:      I had the pleasure of meeting with Ms. Mcfarlane for a return consultation. As you know, Ms. Mariya Gutierrez  is a 46 y.o. [id number] at 31w4d ([address]) who is being followed by our office for multiple medical issues. Today, Ms. Mcfarlane reports that she feels well. She notes good fetal movement and denies any symptoms of leaking of fluid, vaginal bleeding, and/or contractions. She had a fetal ultrasound that was notable for the following. There is a single intrauterine gestation in a cephalic presentation with a heart rate of 134 beats per minute. The placenta is anterior. The amniotic fluid index is 11.7 cm. The composite gestational age is 30w7d. The estimated fetal weight is at the 54th percentile. BPP 8/8.  Umbilical artery Doppler studies are normal.  MCA PSV normal.  MCA PI normal.  CPR PI normal.      PERTINENT PHYSICAL EXAMINATION:   /74 (Position: Sitting)   Pulse 86   Wt 174 lb 3.2 oz (79 kg)   LMP 2021   BMI 31.86 kg/m²     Urine dipstick:   Negative for Glucose    Negative for Albumin      A fetal ultrasound assessment was performed today. A report is enclosed for your review. Assessment & Plan:  46 y.o.  at 31w4d ([address]) with:    1. Pregnancy dating -- The patient's pregnancy dating was previously reviewed. The patient reported that she had her Mirena removed on 2021. She was tracking her periods. Per the vickie she was using, she was having 25-day cycles. She reported a last menstrual period of 2021. However, she had 2 periods in December. Her first was on 2021. This would have been a 16-day cycle. The patient's initial ultrasound was reviewed. Based on her last menstrual period, she was 13 weeks 2 days, SHARA 2022. Based on the ultrasound, she was 14 weeks 3 days, SHARA 2022. There was an 8-day difference between dating by her LMP and the patient's initial ultrasound. Given there was greater than a 7-day discrepancy, the recommendation was made to use the 14-week ultrasound for dating. Thus, her SHARA is 2022 based on a 14-week ultrasound. The patient returned for an anatomy ultrasound. She was 19 weeks 3 days by her 14-week ultrasound. The composite gestational age was 22 weeks 5 days. Fetal growth was appropriate for the gestational age based on her 14-week ultrasound. The patient returns at 22 weeks 4 days. The composite gestational age was 25 weeks gestation and again agreed with dating by her 14-week ultrasound. The patient returned at 25 weeks 5 days. Fetal growth was again appropriate for the gestational age based on dating by her 14-week ultrasound. The patient returned at 28 weeks 4 days. Fetal growth was again appropriate for the gestational age based on dating by her 14-week ultrasound. The patient returns today at 31 weeks 4 days. Fetal growth was again appropriate for the gestational age. There is a slight lag in the head circumference. Fetal growth will be reevaluated in 3 weeks. 2.  Advanced maternal age  -- The patient was previously counseled regarding the implications of advanced maternal age in pregnancy, specifically that of aneuploidy. Counseling was reviewed. The patient did not have any additional questions or concerns. The patient previously completed screening with NIPT. Her results were previously reviewed. The fetal fraction was 9% and the results were low risk for aneuploidy. Per the report, the fetus is female. The genetic screening results were reviewed with the patient. The patient declined any additional diagnostic testing. Since the patient had early screening with NIPT, a maternal serum AFP is recommended between 15 and 22 weeks to screen for open neural tube defects. A maternal serum AFP was normal at 1.02 MOM. Again, recent studies have reported that there may be an increased risk for fetal loss in the setting of advanced maternal age. Thus, increased surveillance is recommended. I recommend monitoring fetal growth serially, every 4 weeks beginning at 24-26 weeks' gestation. She should monitor fetal kick counts daily starting at 28 weeks' gestation. At 36 weeks' gestation, she should be scheduled for a weekly non stress test or biophysical profile. I recommend delivery at 44 week's gestation. Given there is an increased risk for hypertensive disorders of pregnancy in the setting of advanced maternal age, the possible utility of a low dose aspirin in reducing her risk for hypertensive disorders of pregnancy was reviewed. The risks and benefits of low dose aspirin were discussed. She was counseled to continue taking a daily low-dose aspirin for the remainder of pregnancy and 6 to 8 weeks postpartum. 3. History of  X3 -- The patient's first pregnancy was delivered via  secondary to a breech presentation.   She then had a 2 repeat C-sections. The placenta is anterior and above the lower uterine segment. The lower uterine segment appears thin on ultrasound. The appearance will be monitored on follow-up ultrasound. She plans to have a repeat  for delivery. 4. History of anemia -- The patient reported that her prior pregnancies were complicated by anemia. Secondary to this history, a baseline nutrition panel and thyroid function studies were recommended. Testing was completed on 3/30/2022, her results included: H/H12.1/35.6, MCV 87, platelet count 483,267, potassium 3.8, creatinine 0.7, calcium 9.2, ALT 20, AST 18, ferritin 28, folate >20, vitamin B12 591, vitamin D 31, magnesium 1.9, TSH 2.9, free T4 1.19, free T3 3, TPO negative, antithyroglobulin antibody negative, hemoglobin A1c 4.8%, urinalysis negative, urine culture negative, urine protein creatinine ratio 0.1.  --Additional recommendations are below. 5.  History of macrosomia -- The patient's third child weighed 9 pounds 1 ounce at 39 weeks gestation. She had a repeat  for delivery. She denies having a history of gestational diabetes. Because of this history, she is at risk for having another child with macrosomia. Fetal growth should be monitored serially, every 3 to 4 weeks starting at 24 to 26 weeks gestation. Fetal growth was appropriate for the gestational age today at 34 weeks 4 days. A baseline hemoglobin A1c was checked on 3/30/2022. It was normal at 4.8%. The patient had a baseline Glucola on 3/8/2022 that was normal at 128. She should have repeat screening at 24 to 28 weeks gestation. 6.  Work exposures -- The patient previously indicated that she works as an [employer]. She was counseled that she may be at increased risk for viral exposures such as CMV and parvovirus. She was given orders to have baseline screening for these viruses. Baseline screening was completed on 3/30/2022.   Her results included: CMV IgG positive/IgM negative, parvovirus IgG positive/IgM negative. The results indicate past exposure to CMV and immunity to parvovirus. The results were previously reviewed with the patient. Precautions were reviewed. 7.  Family history of bleeding disorder -- The patient reported that her nephew (sister's son) has some type of bleeding disorder. She was unsure of the name of the condition. She was encouraged to obtain additional information regarding her nephew's condition. The patient previously reported that her nephew has an issue with factor V. She was unsure if he has factor V Leiden or factor V deficiency. She states that the condition was inherited from his father. The patient reports that her sister was tested and does not have this condition. Subsequently, the patient reported that her nephew has a the factor V Leiden gene mutation. He actually has a clotting disorder. Again, he inherited this condition from his father. The patient's sister does not have the factor V Leiden mutation. The patient was counseled that if her sister does not have this condition or gene, that I would not anticipate any increased risk for the patient or her child. 8.  Family history of thyroid disease -- The patient's family history is notable for thyroid disease in her mother. Given this family history, baseline thyroid function studies and a screening thyroid peroxidase antibody/antithyroglobulin antibody were recommended. The patient completed baseline screening on 3/30/2022, her results included: TSH 2.9, free T4 1.19, free T3 3, TPO negative, antithyroglobulin antibody negative. The results were previously reviewed with the patient. 9.  History of ovarian cyst -- The patient reported a history of an ovarian cyst.  She was unsure which side the cyst was on. She was supposed to have follow-up with her primary OB/GYN but did not have the follow-up imaging.        The ovaries were evaluated previously and appeared normal bilaterally. No cystic structures were seen in the adnexa. The reassuring findings were reviewed with the patient. 10.  Vaccination in pregnancy -- The patient was  previously counseled regarding the recommendations for vaccination in pregnancy. Counseling was reviewed. The patient did not have any additional questions or concerns. The patient was counseled regarding the recommendations for the Tdap vaccination in pregnancy. Risks and benefits were discussed. The vaccination is typically administered between 27 and 36 weeks gestation and recommended to confer protection against whooping cough to the . The patient plans to discuss this with her primary provider at her next visit. The patient was also counseled that her partner in any individuals who will be in close contact with the  should also be vaccinated for whooping cough. The patient was counseled regarding recommendation for the flu vaccination in pregnancy for both maternal and infant safety. Risks and benefits were reviewed. She was encouraged to have this vaccination as an outpatient. --She received a flu vaccination     Counseling was provided regarding the recommendation for the Covid vaccination in pregnancy. I advised the patient that the [employer] of Obstetrics and Gynecology and The Society for Maternal Fetal Medicine recommend that all pregnant women be vaccinated for COVID-19. \"Data have shown that COVID-19 infection puts pregnant people at increased risk of severe complications and even death; yet only about 22% of pregnant individuals have received 1 more doses of COVID-19 vaccine according to the [employer] for Disease Control and Prevention. \"     \" Recent data have shown that more than 95% of those were hospitalized and/or dying from COVID-19 are those who have remained unvaccinated.   Pregnant individuals who have decided to wait until after delivery to be vaccinated may be inadvertently exposing themselves to an increased risk of severe illness or death. \"     \" COVID-19 vaccination is the best testing to reduce maternal and fetal complications of MHZHH-05 infection among pregnant people,\" said Starr Quiroga MD, president of the Society for Maternal Fetal Medicine, sub-specialists. The patient was counseled that in the event she opts not to have the Covid vaccination, she should alert her provider immediately if she test positive for Covid. Pregnant women are on the priority list for treatment with monoclonal antibody. This intervention has been shown to decrease the risk for hospitalization and complications related to Covid in pregnancy. --She received a Covid vaccination     The patient expressed verbal understanding of this counseling. 11.   Nausea and vomiting of pregnancy, improving  -- The patients reports that her nausea and vomiting symptoms are significantly improved. She has gained an additional 1 pound since her last visit. She still has occasional symptoms of nausea. She denies any signs of symptoms of dehydration. Precautions were reviewed. The patient was again encouraged to eat small amounts of food every 2-3 hours during the day. He was also encouraged to stay well-hydrated. A nutrition panel (CBC, CMP, magnesium, ferritin, folate, vitamin B12, vitamin D 25 [place]) was recommended. Baseline testing was completed on 3/30/2022, the results are summarized above. Repeat testing was completed on 5/17/2022, her results included: H/H 11.6/36.5, MCV 93.1, platelet count 318,964, potassium 4.3, creatinine 0.7, calcium 9.4, ALT 15, AST 16, magnesium 1.9, ferritin 21, folate 18.7, vitamin B12 464, vitamin D 30, TSH 2.97, free T4 1.04, free T3 2.5, TPO negative, uric acid 3.5, , urine protein creatinine ratio 0.1 urine culture negative.   --Additional recommendations are below     The patient was previously provided with an order for Pepcid, 20 mg twice daily, and Zofran, 4 mg every 8 hours as needed for nausea. She was counseled to call and/or return with worsening symptoms. 12. Constipation, improving -- The patient previously had complaints of constipation. She reports that her symptoms are significantly better. She was again counseled to stay well hydrated and increase fiber, fruit, and vegetable intake. Increased fiber intake was encouraged. She can use colace daily. A prescription was previously provided. She can use a gentle laxative as needed. She can also use a probiotic as needed. Precautions were reviewed. 13.  Dolichocephaly, resolved-- Today's ultrasound was reviewed with the patient. The cephalic index was again normal at 81%. Previously, at 22 weeks 4 days, there was dolichocephaly in that the cephalic index is again 87% (normal >75%). The patient was counseled that there is concern for dolichocephaly given the cephalic index is <28%. This generally indicates that at this time, the fetal head is longer than it is wide. The fetal head shape varies during gestation and can be affected by factors such as presentation (more common with breech presentation), the amniotic fluid level, and having a multiple gestation. Additionally, it can be a normal variant (if mild). If the condition persists, it may be suggestive of a genetic condition or syndrome and/or craniosynostosis. The fetal head measurements will be reassessed at her follow up ultrasound. 14.  Proteinuria -- The patient was previously noted to have trace protein on a urine dip. She was normotensive with a blood pressure of 109/73 and she denied any signs or symptoms of preeclampsia. She denied any signs or symptoms of a urinary tract infection. She was given orders to have a urine culture and urine P/C ratio. If any abnormalities are detected, she will be contacted with results and follow-up recommendations. Preeclampsia precautions were reviewed with the patient. The patient's preeclampsia panel was negative on 6/27/2022. The patient's urine culture was positive for E. coli. See below. 15.  Low vitamin D -- The patient's vitamin D was low at 30 on 5/17/2022  --Recommend vitamin D3 1000 IU daily  --Monitor levels serially  --Monitor nutrition panel q4-6 weeks (CBC, CMP, magnesium, ferritin, folate, vitamin B12, vitamin D25OH)     --Repeat testing completed on 6/27/2022, results included: H/H11.6/35.3, MCV 9.5, PT/INR 1.3/1.1, APTT 27.5, potassium 3.7, creatinine 0.7, calcium 9, ALT 11, AST 14, magnesium 2.1, ferritin 25, folate >20, vitamin B12 458, vitamin D 37, , uric acid 3.3, TSH 1.47, free T4 0.97, free T3 2.2, TPO negative, hemoglobin A1c 4.5%, urinalysis concerning for infection, urine protein creatinine ratio 0.2, urine culture positive for E. Coli. --The patient's vitamin D improved. She was counseled to continue her vitamin D supplement. --Recommend repeat testing in 4 to 6 weeks, on/after 7/25/2022  --Follow up with PCP for long term monitoring and management     16. Low ferritin -- The patient's ferritin was low at 21 (considered low if <15 in absence of anemia or <40 in setting of anemia)  --Ferrous sulfate 325 mg BID prescribed  --Monitor levels serially  --Monitor nutrition panel q4-6 weeks (CBC, CMP, magnesium, ferritin, folate, vitamin B12, vitamin D25OH)     --Repeat testing completed 6/27/2022, the patient's ferritin level increased to 25. Her H/H was stable at 11.6/35.3.  --She was counseled to continue her iron supplement  --Recommend repeat testing in 4 to 6 weeks, on/after 7/25/2022  --Follow up with PCP for long term monitoring and management     17. Elevated Glucola/normal 2-hour OGTT -- The patient's labs were reviewed. Her Glucola was elevated at 145 on 6/27/2022. A 2-hour oral glucose tolerance test was ordered for the patient.   She completed testing on 7/13/2022 and her results were normal.  Her test results included: 85/171/121. 18.  Vaginal irritation/yeast infection, resolved -- The patient had complaints of vaginal itching and burning on 6/28/2022. She reported that her symptoms started on Sunday, June 26. She denied having any significant changes in her discharge. A genital culture was done at her visit. It was positive for yeast.  She was previously contacted and prescribed Monistat 7. The patient reports that her symptoms have improved. 19.  Bacteriuria, resolved -- The patient's urine culture from 6/27/2022 was positive for E. coli. The patient was symptomatic with dysuria during her visit on 6/28/2022. She was treated with Macrobid. The culture was sensitive to Macrobid. The patient reports that her symptoms have resolved. --Recommend repeat urine culture with next of labs    20. Lagging HC -- The ultrasound was reviewed with the patient. The head circumference was at the 6th percentile and measuring at 29 weeks gestation. The intracranial anatomy otherwise appeared normal.  The transverse versus cerebellar diameter was at the 83rd percentile. Patient was counseled that this is likely constitutional.  The head size will be monitored serially on follow-up ultrasound. The patient reports that her oldest daughter also had a small head circumference. --The patient was scheduled to follow-up on 7/25/2022 for a nonstress test.  She should also have fetal testing during her visit with Dr. Guillermo Milan on 7/28/2022. --The patient was scheduled to return in 2 weeks for a visit. She was scheduled for twice-weekly fetal testing. --I requested the patient return for a follow-up assessment in 1 week unless there is a clinical reason for her to return prior to that time. She is to call if she has any problems or questions prior to her next visit.  Further evaluation and management will be dependent on her clinical presentation and the results of her testing. --The patient was advised to call if she has any increased vaginal discharge, vaginal bleeding, contractions, abdominal pain, back pain or any new significant symptomatology prior to her next visit. I advised her that these are signs and symptoms of cervical change and require follow-up assessment when they occur. Preeclampsia precautions were also reviewed with the patient. --The patient was also counseled to call and/or return with any concerns for decreased fetal activity. --The patient is to continue to follow with you in your office for ongoing obstetric care. --The total time spent on today's visit was 30 minutes. This included preparation for the visit (i.e. reviewing prior external notes and test results), performance of a medically appropriate history and examination, counseling, orders for medications, tests or other procedures, and coordination of care. Greater than 50% of the time was spent face-to-face with the patient. This time is exclusive of procedures performed. I answered all of  the patient's questions to her satisfaction. I asked her to call if she had any additional questions prior to her next visit. --At the conclusion of the visit, the patient appeared to have a good understanding of the issues discussed. I answered all of her questions to her satisfaction. I asked her to call if she had any additional questions prior to her next visit. --Thank you for allowing me to participate in the care of this pleasant patient. Please don't hesitate to call me if you have any questions. Sincerely,      Brant Abdul MD, [address]  [phone]      *All or parts of this note may have been generated using a voice recognition program. There may be typo, grammar, or Word substitution errors that have escaped my review of this note.

## 2022-07-25 ENCOUNTER — ROUTINE PRENATAL (OUTPATIENT)
Dept: OBGYN CLINIC | Age: 42
End: 2022-07-25
Payer: COMMERCIAL

## 2022-07-25 VITALS
BODY MASS INDEX: 32.01 KG/M2 | WEIGHT: 175 LBS | SYSTOLIC BLOOD PRESSURE: 119 MMHG | HEART RATE: 86 BPM | DIASTOLIC BLOOD PRESSURE: 79 MMHG

## 2022-07-25 DIAGNOSIS — O34.219 PREVIOUS CESAREAN DELIVERY, ANTEPARTUM CONDITION OR COMPLICATION: ICD-10-CM

## 2022-07-25 DIAGNOSIS — O09.523 ELDERLY MULTIGRAVIDA, THIRD TRIMESTER: Primary | ICD-10-CM

## 2022-07-25 DIAGNOSIS — O99.213 OBESITY AFFECTING PREGNANCY IN THIRD TRIMESTER: ICD-10-CM

## 2022-07-25 DIAGNOSIS — Z3A.32 32 WEEKS GESTATION OF PREGNANCY: ICD-10-CM

## 2022-07-25 PROCEDURE — 99213 OFFICE O/P EST LOW 20 MIN: CPT | Performed by: OBSTETRICS & GYNECOLOGY

## 2022-07-25 PROCEDURE — 99999 PR OFFICE/OUTPT VISIT,PROCEDURE ONLY: CPT | Performed by: OBSTETRICS & GYNECOLOGY

## 2022-07-25 PROCEDURE — 59025 FETAL NON-STRESS TEST: CPT | Performed by: OBSTETRICS & GYNECOLOGY

## 2022-07-25 NOTE — LETTER
NON STRESS TEST INTERPRETATION    22    RE:  Sugar Saenz   : 1980   AGE: 46 y.o. GESTATIONAL AGE:  32w3d    DIAGNOSIS:    Advanced maternal age     [de-identified] previous  deliveries.     INDICATION:  Advanced maternal age       TIME ON:  8:59 AM      TIME OFF:  9:20 AM      RESULT:   REACTIVE      FHR Baseline Rate:   140 bpm    PERIODIC CHANGES:    · Accelerations present, variability moderate, no decelerations noted    COMMENTS:      She is to continue having NST's every 3-4 days, and BPP with umbilical artery doppler studies once per week      Rodriguez Hammer MD      Current encounter billing:  [place] OFFICE/OUTPT VISIT,PROCEDURE ONLY [[id number] [place] FETAL NON-STRESS TEST

## 2022-07-25 NOTE — PATIENT INSTRUCTIONS
Please arrive for your scheduled appointment at least 15 minutes early with your actual insurance card+ a photo ID. Also if you need any refills ordered or have questions, it may take up 48 hours to reply. Please allow ample time for your refills. Call me when you use last refill. Thank you for your cooperation. You might be having an NST at your next appt. Please eat a large snack or breakfast before coming to office. Thank you Call your primary obstetrician with bleeding, leaking of fluid, abdominal tenderness, headache, blurry vision, epigastric pain and increased urinary frequency. Do kick counts after dinner. Call your primary obstetrician if less than 10 kicks in 2 hours after dinner. Call your primary obstetrician with bleeding, leaking of fluid, abdominal tenderness, headache, blurry vision, epigastric pain and increased urinary frequency. if you are sick, not feeling well or have an infectious process going on please reschedule your appointment by calling [phone]. Also if any family members are not feeling well, please do not bring them to your appointment. We appreciate your cooperation. We are doing this in order to protect our pregnant mothers+ their babies. if you are sick, not feeling well or have an infectious process going on please reschedule your appointment by calling [phone]. Also if any family members are not feeling well, please do not bring them to your appointment. We appreciate your cooperation. We are doing this in order to protect our pregnant mothers+ their babies.

## 2022-07-28 ENCOUNTER — ROUTINE PRENATAL (OUTPATIENT)
Dept: OBGYN | Age: 42
End: 2022-07-28
Payer: COMMERCIAL

## 2022-07-28 VITALS
DIASTOLIC BLOOD PRESSURE: 62 MMHG | WEIGHT: 175 LBS | BODY MASS INDEX: 32.01 KG/M2 | HEART RATE: 99 BPM | SYSTOLIC BLOOD PRESSURE: 119 MMHG

## 2022-07-28 DIAGNOSIS — Z34.93 PRENATAL CARE IN THIRD TRIMESTER: Primary | ICD-10-CM

## 2022-07-28 DIAGNOSIS — O09.293 HISTORY OF MACROSOMIA IN INFANT IN PRIOR PREGNANCY, CURRENTLY PREGNANT IN THIRD TRIMESTER: ICD-10-CM

## 2022-07-28 DIAGNOSIS — O16.3 ELEVATED BLOOD PRESSURE AFFECTING PREGNANCY IN THIRD TRIMESTER, ANTEPARTUM: ICD-10-CM

## 2022-07-28 DIAGNOSIS — Z98.891 HISTORY OF CESAREAN DELIVERY: ICD-10-CM

## 2022-07-28 DIAGNOSIS — O09.523 MULTIGRAVIDA OF ADVANCED MATERNAL AGE IN THIRD TRIMESTER: ICD-10-CM

## 2022-07-28 LAB
GLUCOSE URINE, POC: NEGATIVE
PROTEIN UA: NEGATIVE

## 2022-07-28 PROCEDURE — G8427 DOCREV CUR MEDS BY ELIG CLIN: HCPCS | Performed by: OBSTETRICS & GYNECOLOGY

## 2022-07-28 PROCEDURE — 59025 FETAL NON-STRESS TEST: CPT | Performed by: OBSTETRICS & GYNECOLOGY

## 2022-07-28 PROCEDURE — 81002 URINALYSIS NONAUTO W/O SCOPE: CPT | Performed by: OBSTETRICS & GYNECOLOGY

## 2022-07-28 PROCEDURE — 99213 OFFICE O/P EST LOW 20 MIN: CPT | Performed by: OBSTETRICS & GYNECOLOGY

## 2022-07-28 PROCEDURE — G8419 CALC BMI OUT NRM PARAM NOF/U: HCPCS | Performed by: OBSTETRICS & GYNECOLOGY

## 2022-07-28 PROCEDURE — 1036F TOBACCO NON-USER: CPT | Performed by: OBSTETRICS & GYNECOLOGY

## 2022-07-28 NOTE — PROGRESS NOTES
NON STRESS TEST INTERPRETATION    22    RE:  Yoanna Rodriguez   : 1980   AGE: 46 y.o.     GESTATIONAL AGE:  32w6d      INDICATION:  AMA, 3 prior  sections     TIME ON:  852      TIME OFF:  936      RESULT:   REACTIVE      FHR Baseline Rate:   130 bpm    PERIODIC CHANGES:    Accelerations present, variability moderate, no decelerations noted    Miranda Banks MD

## 2022-07-28 NOTE — PROGRESS NOTES
Florentin Ballard    Patient presents for routine prenatal visit today at [address] [place] [address]. Patient is following with MFM. Dolichocephaly has resolved. Next appointment is 22. Patient with three prior  sections. Repeat scheduled for 22 at 8:30am with Dr. Shayy Guerrero. Advised to arrive at 6:30am.     Denies complaints  Denies VB, or cramping  Feeling movement at this time. Reviewed above. Fetal Heart Rate: 125    Counseled on importance of TDAP for PT and S.O and other caregivers of infant. Received this 22. All questions and concerns addressed at this time      ICD-10-CM    1. Prenatal care in third trimester  Z34.93       2. History of  delivery  Z98.891 Fetal nonstress test      3. BMI 30.0-30.9,adult  Z68.30       4. Multigravida of advanced maternal age in third trimester  O09.523 Fetal nonstress test      5. History of macrosomia in infant in prior pregnancy, currently pregnant in third trimester  O09.293 Fetal nonstress test      6. Elevated blood pressure affecting pregnancy in third trimester, antepartum  O16.3 Fetal nonstress test           Return in about 2 weeks (around 2022) for OB visit, NST.     Dru Hernandez MD

## 2022-08-01 ENCOUNTER — ROUTINE PRENATAL (OUTPATIENT)
Dept: OBGYN CLINIC | Age: 42
End: 2022-08-01
Payer: COMMERCIAL

## 2022-08-01 ENCOUNTER — ANCILLARY PROCEDURE (OUTPATIENT)
Dept: OBGYN CLINIC | Age: 42
End: 2022-08-01
Payer: COMMERCIAL

## 2022-08-01 VITALS
DIASTOLIC BLOOD PRESSURE: 75 MMHG | BODY MASS INDEX: 32.15 KG/M2 | SYSTOLIC BLOOD PRESSURE: 118 MMHG | HEART RATE: 92 BPM | WEIGHT: 175.8 LBS

## 2022-08-01 DIAGNOSIS — R79.89 LOW VITAMIN D LEVEL: ICD-10-CM

## 2022-08-01 DIAGNOSIS — O09.523 ELDERLY MULTIGRAVIDA, THIRD TRIMESTER: ICD-10-CM

## 2022-08-01 DIAGNOSIS — R80.9 URINE PROTEIN INCREASED: ICD-10-CM

## 2022-08-01 DIAGNOSIS — R73.09 ABNORMAL GLUCOSE TOLERANCE TEST: ICD-10-CM

## 2022-08-01 DIAGNOSIS — Z86.2 HISTORY OF ANEMIA: ICD-10-CM

## 2022-08-01 DIAGNOSIS — O09.291 HISTORY OF MACROSOMIA IN INFANT IN PRIOR PREGNANCY, CURRENTLY PREGNANT IN FIRST TRIMESTER: Primary | ICD-10-CM

## 2022-08-01 DIAGNOSIS — R79.0 LOW FERRITIN: ICD-10-CM

## 2022-08-01 LAB
GLUCOSE URINE, POC: NEGATIVE
PROTEIN UA: NEGATIVE

## 2022-08-01 PROCEDURE — 81002 URINALYSIS NONAUTO W/O SCOPE: CPT | Performed by: OBSTETRICS & GYNECOLOGY

## 2022-08-01 PROCEDURE — G8427 DOCREV CUR MEDS BY ELIG CLIN: HCPCS | Performed by: OBSTETRICS & GYNECOLOGY

## 2022-08-01 PROCEDURE — G8419 CALC BMI OUT NRM PARAM NOF/U: HCPCS | Performed by: OBSTETRICS & GYNECOLOGY

## 2022-08-01 PROCEDURE — 1036F TOBACCO NON-USER: CPT | Performed by: OBSTETRICS & GYNECOLOGY

## 2022-08-01 PROCEDURE — 76821 MIDDLE CEREBRAL ARTERY ECHO: CPT | Performed by: OBSTETRICS & GYNECOLOGY

## 2022-08-01 PROCEDURE — 76815 OB US LIMITED FETUS(S): CPT | Performed by: OBSTETRICS & GYNECOLOGY

## 2022-08-01 PROCEDURE — 99214 OFFICE O/P EST MOD 30 MIN: CPT | Performed by: OBSTETRICS & GYNECOLOGY

## 2022-08-01 PROCEDURE — 76818 FETAL BIOPHYS PROFILE W/NST: CPT | Performed by: OBSTETRICS & GYNECOLOGY

## 2022-08-01 PROCEDURE — 99213 OFFICE O/P EST LOW 20 MIN: CPT | Performed by: OBSTETRICS & GYNECOLOGY

## 2022-08-01 PROCEDURE — 76820 UMBILICAL ARTERY ECHO: CPT | Performed by: OBSTETRICS & GYNECOLOGY

## 2022-08-01 NOTE — LETTER
[facility]  [address] [facility] [address]  Phone: [phone]  Fax: [phone]           John Lantigua MD      2022     Patient: Zachary Persaud   MR Number: [id number]   YOB: 1980   Date of Visit: 2022       Dear Dr. Camilla Curling:    Thank you for referring Zachary Persaud to me for evaluation/treatment. Below are the relevant portions of my assessment and plan of care. If you have questions, please do not hesitate to call me. I look forward to following Tiana Shantal along with you. Sincerely,        John Lantigua MD    CC providers:  Joel Monsalve MD  [address] [facility]  [address] [facility]       2022      Joel Monsalve, [address] [facility],  [address]     RE:  Korina Love  : 1980   AGE: 46 y.o. This report has been created using voice recognition software. It may contain errors which are inherent in voice recognition technology. Dear Dr. Camilla Curling:      I had the pleasure of meeting with Ms. Mcfarlane for a return consultation. As you know, Ms. Danna Garner  is a 46 y.o.  at 33w3d ([address]) who is being followed by our office for multiple medical issues. Today, Ms. Mcfarlane reports that she feels well. She notes good fetal movement and denies any symptoms of leaking of fluid, vaginal bleeding, and/or contractions. She had a fetal ultrasound that was notable for the following. There is a single intrauterine gestation in a cephalic presentation with a heart rate of 145 beats per minute. The placenta is anterior. The amniotic fluid index is 14 cm. BPP 10/10.   Umbilical artery PI normal.  MCA PSV normal.  CPR PI normal.      PERTINENT PHYSICAL EXAMINATION:   /75 (Position: Sitting)   Pulse 92   Wt 175 lb 12.8 oz (79.7 kg)   LMP 2021   BMI 32.15 kg/m²     Urine dipstick:   Negative for Glucose    Negative for Albumin      A fetal ultrasound assessment was performed today. A report is enclosed for your review. Assessment & Plan:  46 y.o.  at 33w3d ([address]) with:    1. Pregnancy dating -- The patient's pregnancy dating was previously reviewed. The patient reported that she had her Mirena removed on 2021. She was tracking her periods. Per the vickie she was using, she was having 25-day cycles. She reported a last menstrual period of 2021. However, she had 2 periods in December. Her first was on 2021. This would have been a 16-day cycle. The patient's initial ultrasound was reviewed. Based on her last menstrual period, she was 13 weeks 2 days, SHARA 2022. Based on the ultrasound, she was 14 weeks 3 days, SHARA 2022. There was an 8-day difference between dating by her LMP and the patient's initial ultrasound. Given there was greater than a 7-day discrepancy, the recommendation was made to use the 14-week ultrasound for dating. Thus, her SHARA is 2022 based on a 14-week ultrasound. The patient returned for an anatomy ultrasound. She was 19 weeks 3 days by her 14-week ultrasound. The composite gestational age was 22 weeks 5 days. Fetal growth was appropriate for the gestational age based on her 14-week ultrasound. The patient returns at 22 weeks 4 days. The composite gestational age was 25 weeks gestation and again agreed with dating by her 14-week ultrasound. The patient returned at 25 weeks 5 days. Fetal growth was again appropriate for the gestational age based on dating by her 14-week ultrasound. The patient returned at 28 weeks 4 days. Fetal growth was again appropriate for the gestational age based on dating by her 14-week ultrasound. The patient returned at 31 weeks 4 days. Fetal growth was again appropriate for the gestational age. There was a slight lag in the head circumference. Fetal growth will be reevaluated in 1 week.      2.  Advanced maternal age  -- The patient was previously counseled regarding the implications of advanced maternal age in pregnancy, specifically that of aneuploidy. Counseling was reviewed. The patient did not have any additional questions or concerns. The patient previously completed screening with NIPT. Her results were previously reviewed. The fetal fraction was 9% and the results were low risk for aneuploidy. Per the report, the fetus is female. The genetic screening results were reviewed with the patient. The patient declined any additional diagnostic testing. Since the patient had early screening with NIPT, a maternal serum AFP is recommended between 15 and 22 weeks to screen for open neural tube defects. A maternal serum AFP was normal at 1.02 MOM. Again, recent studies have reported that there may be an increased risk for fetal loss in the setting of advanced maternal age. Thus, increased surveillance is recommended. I recommend monitoring fetal growth serially, every 4 weeks beginning at 24-26 weeks' gestation. She should monitor fetal kick counts daily starting at 28 weeks' gestation. At 36 weeks' gestation, she should be scheduled for a weekly non stress test or biophysical profile. I recommend delivery at 44 week's gestation. Given there is an increased risk for hypertensive disorders of pregnancy in the setting of advanced maternal age, the possible utility of a low dose aspirin in reducing her risk for hypertensive disorders of pregnancy was reviewed. The risks and benefits of low dose aspirin were discussed. She was counseled to continue taking a daily low-dose aspirin for the remainder of pregnancy and 6 to 8 weeks postpartum. 3. History of  X3 -- The patient's first pregnancy was delivered via  secondary to a breech presentation. She then had a 2 repeat C-sections. The placenta is anterior and above the lower uterine segment.   The lower uterine segment previously appeared thin on ultrasound. The appearance will be monitored on follow-up ultrasound. She plans to have a repeat  for delivery. She is currently scheduled for a repeat  on 2022 at 39 weeks 3 days. Given her history of 3 prior C-sections, I recommend scheduling delivery at 38 weeks gestation. Patients with multiple prior low transverse  sections are at increased risk for uterine scar separation and/or dehiscence compared to individuals with 1 prior . The appearance of the lower uterine segment can be monitored, however, a thin lower uterine segment is not highly predictive of uterine rupture. Some experts recommend an early term delivery (37-38 weeks) in women with greater than or equal to 3 prior C-sections in order to reduce the risk for maternal and fetal complications. There is currently no consensus or best practice regarding this issue. Given the increased risk for maternal and fetal morbidity and mortality related to uterine scar separation or dehiscence, I typically recommend delivery at 40 to 38 weeks gestation in women with greater than or equal to 3 prior C-sections. Delivery may be indicated sooner if there are concerns for a thin lower uterine segment or scar separation during a prior . For planned delivery at 37 weeks gestation, I recommend a course of betamethasone 2 to 7 days prior to a scheduled delivery, if there are no contraindications. I would defer delivery timing to the referring provider. 4. History of anemia -- The patient reported that her prior pregnancies were complicated by anemia. Secondary to this history, a baseline nutrition panel and thyroid function studies were recommended.        Testing was completed on 3/30/2022, her results included: H/H12.1/35.6, MCV 87, platelet count 161,393, potassium 3.8, creatinine 0.7, calcium 9.2, ALT 20, AST 18, ferritin 28, folate >20, vitamin B12 591, vitamin D 31, magnesium 1.9, TSH 2.9, free T4 1.19, free T3 3, TPO negative, antithyroglobulin antibody negative, hemoglobin A1c 4.8%, urinalysis negative, urine culture negative, urine protein creatinine ratio 0.1.  --Additional recommendations are below. 5.  History of macrosomia -- The patient's third child weighed 9 pounds 1 ounce at 39 weeks gestation. She had a repeat  for delivery. She denies having a history of gestational diabetes. Because of this history, she is at risk for having another child with macrosomia. Fetal growth should be monitored serially, every 3 to 4 weeks starting at 24 to 26 weeks gestation. Fetal growth was appropriate for the gestational age at 34 weeks 4 days. A baseline hemoglobin A1c was checked on 3/30/2022. It was normal at 4.8%. The patient had a baseline Glucola on 3/8/2022 that was normal at 128. She should have repeat screening at 24 to 28 weeks gestation. 6.  Work exposures -- The patient previously indicated that she works as an [employer]. She was counseled that she may be at increased risk for viral exposures such as CMV and parvovirus. She was given orders to have baseline screening for these viruses. Baseline screening was completed on 3/30/2022. Her results included: CMV IgG positive/IgM negative, parvovirus IgG positive/IgM negative. The results indicate past exposure to CMV and immunity to parvovirus. The results were previously reviewed with the patient. Precautions were reviewed. 7.  Family history of bleeding disorder -- The patient reported that her nephew (sister's son) has some type of bleeding disorder. She was unsure of the name of the condition. She was encouraged to obtain additional information regarding her nephew's condition. The patient previously reported that her nephew has an issue with factor V. She was unsure if he has factor V Leiden or factor V deficiency.   She states that the condition was inherited from his father. The patient reports that her sister was tested and does not have this condition. Subsequently, the patient reported that her nephew has a the factor V Leiden gene mutation. He actually has a clotting disorder. Again, he inherited this condition from his father. The patient's sister does not have the factor V Leiden mutation. The patient was previously counseled that if her sister does not have this condition or gene, that I would not anticipate any increased risk for the patient or her child. 8.  Family history of thyroid disease -- The patient's family history was previously reviewed and notable for thyroid disease in her mother. Given this family history, baseline thyroid function studies and a screening thyroid peroxidase antibody/antithyroglobulin antibody were recommended. The patient completed baseline screening on 3/30/2022, her results included: TSH 2.9, free T4 1.19, free T3 3, TPO negative, antithyroglobulin antibody negative. The results were previously reviewed with the patient. 9.  History of ovarian cyst -- The patient reported a history of an ovarian cyst.  She was unsure which side the cyst was on. She was supposed to have follow-up with her primary OB/GYN but did not have the follow-up imaging. The ovaries were evaluated previously and appeared normal bilaterally. No cystic structures were seen in the adnexa. The reassuring findings were reviewed with the patient. 10.  Vaccination in pregnancy -- The patient was  previously counseled regarding the recommendations for vaccination in pregnancy. Counseling was reviewed. The patient did not have any additional questions or concerns. The patient was counseled regarding the recommendations for the Tdap vaccination in pregnancy. Risks and benefits were discussed.  The vaccination is typically administered between 27 and 36 weeks gestation and recommended to confer protection against whooping cough to the . The patient plans to discuss this with her primary provider at her next visit. The patient was also counseled that her partner in any individuals who will be in close contact with the  should also be vaccinated for whooping cough. The patient was counseled regarding recommendation for the flu vaccination in pregnancy for both maternal and infant safety. Risks and benefits were reviewed. She was encouraged to have this vaccination as an outpatient. --She received a flu vaccination     Counseling was provided regarding the recommendation for the Covid vaccination in pregnancy. I advised the patient that the [facility] Obstetrics and Gynecology and The Society for Maternal Fetal Medicine recommend that all pregnant women be vaccinated for COVID-19. \"Data have shown that COVID-19 infection puts pregnant people at increased risk of severe complications and even death; yet only about 22% of pregnant individuals have received 1 more doses of COVID-19 vaccine according to the [employer] for Disease Control and Prevention. \"     \" Recent data have shown that more than 95% of those were hospitalized and/or dying from COVID-19 are those who have remained unvaccinated. Pregnant individuals who have decided to wait until after delivery to be vaccinated may be inadvertently exposing themselves to an increased risk of severe illness or death. \"     \" COVID-19 vaccination is the best testing to reduce maternal and fetal complications of VIIJI-53 infection among pregnant people,\" said Celsa Pascual MD, president of the Society for Maternal Fetal Medicine, sub-specialists. The patient was counseled that in the event she opts not to have the Covid vaccination, she should alert her provider immediately if she test positive for Covid. Pregnant women are on the priority list for treatment with monoclonal antibody.   This intervention has been shown to decrease the risk for hospitalization and complications related to Covid in pregnancy. --She received a Covid vaccination     The patient expressed verbal understanding of this counseling. 11.   Nausea and vomiting of pregnancy, improving  -- The patients reports that her nausea and vomiting symptoms are significantly improved. She has gained an additional 1 pound since her last visit. She still has occasional symptoms of nausea. She denies any signs of symptoms of dehydration. Precautions were reviewed. The patient was again encouraged to eat small amounts of food every 2-3 hours during the day. He was also encouraged to stay well-hydrated. A nutrition panel (CBC, CMP, magnesium, ferritin, folate, vitamin B12, vitamin D 25 [place]) was recommended. Baseline testing was completed on 3/30/2022, the results are summarized above. Repeat testing was completed on 5/17/2022, her results included: H/H 11.6/36.5, MCV 93.1, platelet count 602,702, potassium 4.3, creatinine 0.7, calcium 9.4, ALT 15, AST 16, magnesium 1.9, ferritin 21, folate 18.7, vitamin B12 464, vitamin D 30, TSH 2.97, free T4 1.04, free T3 2.5, TPO negative, uric acid 3.5, , urine protein creatinine ratio 0.1 urine culture negative. --Additional recommendations are below        12. Constipation, improving -- The patient previously had complaints of constipation. She again reports that her symptoms are significantly better. She was again counseled to stay well hydrated and increase fiber, fruit, and vegetable intake. Increased fiber intake was encouraged. She can use colace daily. A prescription was previously provided. She can use a gentle laxative as needed. She can also use a probiotic as needed. Precautions were reviewed. 13.  Dolichocephaly, resolved-- The ultrasound from 31 weeks 6 days was reviewed with the patient. The cephalic index was again normal at 81%.      Previously, at 22 weeks 4 days, there was dolichocephaly in that the cephalic index is again 45% (normal >75%). The patient was counseled that there is concern for dolichocephaly given the cephalic index is <18%. This generally indicates that at this time, the fetal head is longer than it is wide. The fetal head shape varies during gestation and can be affected by factors such as presentation (more common with breech presentation), the amniotic fluid level, and having a multiple gestation. Additionally, it can be a normal variant (if mild). If the condition persists, it may be suggestive of a genetic condition or syndrome and/or craniosynostosis. The fetal head measurements will be reassessed at her follow up ultrasound. 14.  Proteinuria -- The patient was previously noted to have trace protein on a urine dip. She was normotensive with a blood pressure of 109/73 and she denied any signs or symptoms of preeclampsia. She denied any signs or symptoms of a urinary tract infection. She was given orders to have a urine culture and urine P/C ratio. If any abnormalities are detected, she will be contacted with results and follow-up recommendations. Preeclampsia precautions were reviewed with the patient. The patient's preeclampsia panel was negative on 6/27/2022. The patient's urine culture was positive for E. coli. See below. The patient's urine dip was negative for protein today.      15.  Low vitamin D -- The patient's vitamin D was low at 30 on 5/17/2022  --Recommend vitamin D3 1000 IU daily  --Monitor levels serially  --Monitor nutrition panel q4-6 weeks (CBC, CMP, magnesium, ferritin, folate, vitamin B12, vitamin D25OH)     --Repeat testing completed on 6/27/2022, results included: H/H11.6/35.3, MCV 9.5, PT/INR 1.3/1.1, APTT 27.5, potassium 3.7, creatinine 0.7, calcium 9, ALT 11, AST 14, magnesium 2.1, ferritin 25, folate >20, vitamin B12 458, vitamin D 37, , uric acid 3.3, TSH 1.47, free T4 0.97, free T3 2.2, TPO negative, hemoglobin A1c 4.5%, urinalysis concerning for infection, urine protein creatinine ratio 0.2, urine culture positive for E. Coli. --The patient's vitamin D improved. She was counseled to continue her vitamin D supplement. -- Repeat testing ordered  --Follow up with PCP for long term monitoring and management     16. Low ferritin -- The patient's ferritin was low at 21 (considered low if <15 in absence of anemia or <40 in setting of anemia)  --Ferrous sulfate 325 mg BID prescribed  --Monitor levels serially  --Monitor nutrition panel q4-6 weeks (CBC, CMP, magnesium, ferritin, folate, vitamin B12, vitamin D25OH)     --Repeat testing completed 6/27/2022, the patient's ferritin level increased to 25. Her H/H was stable at 11.6/35.3.  --She was counseled to continue her iron supplement    --Repeat testing ordered  --Follow up with PCP for long term monitoring and management     17. Elevated Glucola/normal 2-hour OGTT -- The patient's labs were reviewed. Her Glucola was elevated at 145 on 6/27/2022. A 2-hour oral glucose tolerance test was ordered for the patient. She completed testing on 7/13/2022 and her results were normal.  Her test results included: 85/171/121. 18.  Vaginal irritation/yeast infection, resolved -- The patient had complaints of vaginal itching and burning on 6/28/2022. She reported that her symptoms started on Sunday, June 26. She denied having any significant changes in her discharge. A genital culture was done at her visit. It was positive for yeast.  She was previously contacted and prescribed Monistat 7. The patient reports that her symptoms have improved. 19.  Bacteriuria, resolved -- The patient's urine culture from 6/27/2022 was positive for E. coli. The patient was symptomatic with dysuria during her visit on 6/28/2022. She was treated with Macrobid. The culture was sensitive to Macrobid. The patient reports that her symptoms have resolved.   --Recommend repeat urine culture with next of labs  --Repeat urine culture order     20. Lagging HC -- The ultrasound from 31 weeks 6 days was reviewed with the patient. The head circumference was at the 6th percentile. The intracranial anatomy otherwise appeared normal.  The transverse versus cerebellar diameter was at the 83rd percentile. The patient was counseled that this is likely constitutional.  The head size will be monitored serially on follow-up ultrasound. The patient reports that her oldest daughter also had a small head circumference. Fetal growth be reevaluated in 1 week. --The patient is scheduled for twice-weekly fetal testing. --The patient was counseled that she can have testing with her referring provider when scheduled for routine visits. Those weeks, she should see MFM once and her referring provider once for twice-weekly testing. --I requested the patient return for a follow-up assessment in 3 days unless there is a clinical reason for her to return prior to that time. She is to call if she has any problems or questions prior to her next visit. Further evaluation and management will be dependent on her clinical presentation and the results of her testing. --The patient was advised to call if she has any increased vaginal discharge, vaginal bleeding, contractions, abdominal pain, back pain or any new significant symptomatology prior to her next visit. I advised her that these are signs and symptoms of cervical change and require follow-up assessment when they occur. Preeclampsia precautions were also reviewed with the patient. --The patient was also counseled to call and/or return with any concerns for decreased fetal activity. --The patient is to continue to follow with you in your office for ongoing obstetric care. --The total time spent on today's visit was 30 minutes.   This included preparation for the visit (i.e. reviewing prior external notes and test results), performance of a medically appropriate history and examination, counseling, orders for medications, tests or other procedures, and coordination of care. Greater than 50% of the time was spent face-to-face with the patient. This time is exclusive of procedures performed. I answered all of  the patient's questions to her satisfaction. I asked her to call if she had any additional questions prior to her next visit. --At the conclusion of the visit, the patient appeared to have a good understanding of the issues discussed. I answered all of her questions to her satisfaction. I asked her to call if she had any additional questions prior to her next visit. --Thank you for allowing me to participate in the care of this pleasant patient. Please don't hesitate to call me if you have any questions. Sincerely,      Alisha Louis MD, [place] [address]  [phone]      *All or parts of this note may have been generated using a voice recognition program. There may be typo, grammar, or Word substitution errors that have escaped my review of this note.

## 2022-08-01 NOTE — PROGRESS NOTES
2022      Lakeisha WuKeren, [address] [facility]  [place],  [address]     RE:  Evelin Perez  : 1980   AGE: 46 y.o. This report has been created using voice recognition software. It may contain errors which are inherent in voice recognition technology. Dear Dr. Terrance Jiménez:      I had the pleasure of meeting with Ms. Mcfarlane for a return consultation. As you know, Ms. Vanessa Dooley  is a 46 y.o.  at 33w3d ([address]) who is being followed by our office for multiple medical issues. Today, Ms. Mcfarlane reports that she feels well. She notes good fetal movement and denies any symptoms of leaking of fluid, vaginal bleeding, and/or contractions. She had a fetal ultrasound that was notable for the following. There is a single intrauterine gestation in a cephalic presentation with a heart rate of 145 beats per minute. The placenta is anterior. The amniotic fluid index is 14 cm. BPP 10/10. Umbilical artery PI normal.  MCA PSV normal.  CPR PI normal.      PERTINENT PHYSICAL EXAMINATION:   /75 (Position: Sitting)   Pulse 92   Wt 175 lb 12.8 oz (79.7 kg)   LMP 2021   BMI 32.15 kg/m²     Urine dipstick:   Negative for Glucose    Negative for Albumin      A fetal ultrasound assessment was performed today. A report is enclosed for your review. Assessment & Plan:  46 y.o.  at 33w3d ([facility] 149) with:    1. Pregnancy dating -- The patient's pregnancy dating was previously reviewed. The patient reported that she had her Mirena removed on 2021. She was tracking her periods. Per the vickie she was using, she was having 25-day cycles. She reported a last menstrual period of 2021. However, she had 2 periods in December. Her first was on 2021. This would have been a 16-day cycle. The patient's initial ultrasound was reviewed. Based on her last menstrual period, she was 13 weeks 2 days, SHARA 2022.   Based on the ultrasound, she was 14 weeks 3 days, SHARA 9/16/2022. There was an 8-day difference between dating by her LMP and the patient's initial ultrasound. Given there was greater than a 7-day discrepancy, the recommendation was made to use the 14-week ultrasound for dating. Thus, her SHARA is 9/16/2022 based on a 14-week ultrasound. The patient returned for an anatomy ultrasound. She was 19 weeks 3 days by her 14-week ultrasound. The composite gestational age was 22 weeks 5 days. Fetal growth was appropriate for the gestational age based on her 14-week ultrasound. The patient returns at 22 weeks 4 days. The composite gestational age was 25 weeks gestation and again agreed with dating by her 14-week ultrasound. The patient returned at 25 weeks 5 days. Fetal growth was again appropriate for the gestational age based on dating by her 14-week ultrasound. The patient returned at 28 weeks 4 days. Fetal growth was again appropriate for the gestational age based on dating by her 14-week ultrasound. The patient returned at 31 weeks 4 days. Fetal growth was again appropriate for the gestational age. There was a slight lag in the head circumference. Fetal growth will be reevaluated in 1 week. 2.  Advanced maternal age  -- The patient was previously counseled regarding the implications of advanced maternal age in pregnancy, specifically that of aneuploidy. Counseling was reviewed. The patient did not have any additional questions or concerns. The patient previously completed screening with NIPT. Her results were previously reviewed. The fetal fraction was 9% and the results were low risk for aneuploidy. Per the report, the fetus is female. The genetic screening results were reviewed with the patient. The patient declined any additional diagnostic testing. Since the patient had early screening with NIPT, a maternal serum AFP is recommended between 15 and 22 weeks to screen for open neural tube defects.   A maternal serum AFP was normal at 1.02 MOM. Again, recent studies have reported that there may be an increased risk for fetal loss in the setting of advanced maternal age. Thus, increased surveillance is recommended. I recommend monitoring fetal growth serially, every 4 weeks beginning at 24-26 weeks' gestation. She should monitor fetal kick counts daily starting at 28 weeks' gestation. At 36 weeks' gestation, she should be scheduled for a weekly non stress test or biophysical profile. I recommend delivery at 44 week's gestation. Given there is an increased risk for hypertensive disorders of pregnancy in the setting of advanced maternal age, the possible utility of a low dose aspirin in reducing her risk for hypertensive disorders of pregnancy was reviewed. The risks and benefits of low dose aspirin were discussed. She was counseled to continue taking a daily low-dose aspirin for the remainder of pregnancy and 6 to 8 weeks postpartum. 3. History of  X3 -- The patient's first pregnancy was delivered via  secondary to a breech presentation. She then had a 2 repeat C-sections. The placenta is anterior and above the lower uterine segment. The lower uterine segment previously appeared thin on ultrasound. The appearance will be monitored on follow-up ultrasound. She plans to have a repeat  for delivery. She is currently scheduled for a repeat  on 2022 at 39 weeks 3 days. Given her history of 3 prior C-sections, I recommend scheduling delivery at 38 weeks gestation. Patients with multiple prior low transverse  sections are at increased risk for uterine scar separation and/or dehiscence compared to individuals with 1 prior . The appearance of the lower uterine segment can be monitored, however, a thin lower uterine segment is not highly predictive of uterine rupture.   Some experts recommend an early term delivery (37-38 weeks) in women with greater than or equal to 3 prior C-sections in order to reduce the risk for maternal and fetal complications. There is currently no consensus or best practice regarding this issue. Given the increased risk for maternal and fetal morbidity and mortality related to uterine scar separation or dehiscence, I typically recommend delivery at 40 to 38 weeks gestation in women with greater than or equal to 3 prior C-sections. Delivery may be indicated sooner if there are concerns for a thin lower uterine segment or scar separation during a prior . For planned delivery at 37 weeks gestation, I recommend a course of betamethasone 2 to 7 days prior to a scheduled delivery, if there are no contraindications. I would defer delivery timing to the referring provider. 4. History of anemia -- The patient reported that her prior pregnancies were complicated by anemia. Secondary to this history, a baseline nutrition panel and thyroid function studies were recommended. Testing was completed on 3/30/2022, her results included: H/H12.1/35.6, MCV 87, platelet count 196,981, potassium 3.8, creatinine 0.7, calcium 9.2, ALT 20, AST 18, ferritin 28, folate >20, vitamin B12 591, vitamin D 31, magnesium 1.9, TSH 2.9, free T4 1.19, free T3 3, TPO negative, antithyroglobulin antibody negative, hemoglobin A1c 4.8%, urinalysis negative, urine culture negative, urine protein creatinine ratio 0.1.  --Additional recommendations are below. 5.  History of macrosomia -- The patient's third child weighed 9 pounds 1 ounce at 39 weeks gestation. She had a repeat  for delivery. She denies having a history of gestational diabetes. Because of this history, she is at risk for having another child with macrosomia. Fetal growth should be monitored serially, every 3 to 4 weeks starting at 24 to 26 weeks gestation. Fetal growth was appropriate for the gestational age at 34 weeks 4 days.      A baseline function studies and a screening thyroid peroxidase antibody/antithyroglobulin antibody were recommended. The patient completed baseline screening on 3/30/2022, her results included: TSH 2.9, free T4 1.19, free T3 3, TPO negative, antithyroglobulin antibody negative. The results were previously reviewed with the patient. 9.  History of ovarian cyst -- The patient reported a history of an ovarian cyst.  She was unsure which side the cyst was on. She was supposed to have follow-up with her primary OB/GYN but did not have the follow-up imaging. The ovaries were evaluated previously and appeared normal bilaterally. No cystic structures were seen in the adnexa. The reassuring findings were reviewed with the patient. 10.  Vaccination in pregnancy -- The patient was  previously counseled regarding the recommendations for vaccination in pregnancy. Counseling was reviewed. The patient did not have any additional questions or concerns. The patient was counseled regarding the recommendations for the Tdap vaccination in pregnancy. Risks and benefits were discussed. The vaccination is typically administered between 27 and 36 weeks gestation and recommended to confer protection against whooping cough to the . The patient plans to discuss this with her primary provider at her next visit. The patient was also counseled that her partner in any individuals who will be in close contact with the  should also be vaccinated for whooping cough. The patient was counseled regarding recommendation for the flu vaccination in pregnancy for both maternal and infant safety. Risks and benefits were reviewed. She was encouraged to have this vaccination as an outpatient. --She received a flu vaccination     Counseling was provided regarding the recommendation for the Covid vaccination in pregnancy.   I advised the patient that the [employer] of Obstetrics and Gynecology and The Society for Maternal Fetal Medicine recommend that all pregnant women be vaccinated for COVID-19. \"Data have shown that COVID-19 infection puts pregnant people at increased risk of severe complications and even death; yet only about 22% of pregnant individuals have received 1 more doses of COVID-19 vaccine according to the [employer] for Disease Control and Prevention. \"     \" Recent data have shown that more than 95% of those were hospitalized and/or dying from COVID-19 are those who have remained unvaccinated. Pregnant individuals who have decided to wait until after delivery to be vaccinated may be inadvertently exposing themselves to an increased risk of severe illness or death. \"     \" COVID-19 vaccination is the best testing to reduce maternal and fetal complications of QEHLB-47 infection among pregnant people,\" said Jose Juan Ivey MD, president of the Society for Maternal Fetal Medicine, sub-specialists. The patient was counseled that in the event she opts not to have the Covid vaccination, she should alert her provider immediately if she test positive for Covid. Pregnant women are on the priority list for treatment with monoclonal antibody. This intervention has been shown to decrease the risk for hospitalization and complications related to Covid in pregnancy. --She received a Covid vaccination     The patient expressed verbal understanding of this counseling. 11.   Nausea and vomiting of pregnancy, improving  -- The patients reports that her nausea and vomiting symptoms are significantly improved. She has gained an additional 1 pound since her last visit. She still has occasional symptoms of nausea. She denies any signs of symptoms of dehydration. Precautions were reviewed. The patient was again encouraged to eat small amounts of food every 2-3 hours during the day. He was also encouraged to stay well-hydrated.      A nutrition panel (CBC, CMP, magnesium, ferritin, folate, vitamin B12, vitamin D 25 [place]) was recommended. Baseline testing was completed on 3/30/2022, the results are summarized above. Repeat testing was completed on 5/17/2022, her results included: H/H 11.6/36.5, MCV 93.1, platelet count 278,614, potassium 4.3, creatinine 0.7, calcium 9.4, ALT 15, AST 16, magnesium 1.9, ferritin 21, folate 18.7, vitamin B12 464, vitamin D 30, TSH 2.97, free T4 1.04, free T3 2.5, TPO negative, uric acid 3.5, , urine protein creatinine ratio 0.1 urine culture negative. --Additional recommendations are below        12. Constipation, improving -- The patient previously had complaints of constipation. She again reports that her symptoms are significantly better. She was again counseled to stay well hydrated and increase fiber, fruit, and vegetable intake. Increased fiber intake was encouraged. She can use colace daily. A prescription was previously provided. She can use a gentle laxative as needed. She can also use a probiotic as needed. Precautions were reviewed. 13.  Dolichocephaly, resolved-- The ultrasound from 31 weeks 6 days was reviewed with the patient. The cephalic index was again normal at 81%. Previously, at 22 weeks 4 days, there was dolichocephaly in that the cephalic index is again 30% (normal >75%). The patient was counseled that there is concern for dolichocephaly given the cephalic index is <86%. This generally indicates that at this time, the fetal head is longer than it is wide. The fetal head shape varies during gestation and can be affected by factors such as presentation (more common with breech presentation), the amniotic fluid level, and having a multiple gestation. Additionally, it can be a normal variant (if mild). If the condition persists, it may be suggestive of a genetic condition or syndrome and/or craniosynostosis. The fetal head measurements will be reassessed at her follow up ultrasound.       14.  Proteinuria -- The patient was previously noted to have trace protein on a urine dip. She was normotensive with a blood pressure of 109/73 and she denied any signs or symptoms of preeclampsia. She denied any signs or symptoms of a urinary tract infection. She was given orders to have a urine culture and urine P/C ratio. If any abnormalities are detected, she will be contacted with results and follow-up recommendations. Preeclampsia precautions were reviewed with the patient. The patient's preeclampsia panel was negative on 6/27/2022. The patient's urine culture was positive for E. coli. See below. The patient's urine dip was negative for protein today. 15.  Low vitamin D -- The patient's vitamin D was low at 30 on 5/17/2022  --Recommend vitamin D3 1000 IU daily  --Monitor levels serially  --Monitor nutrition panel q4-6 weeks (CBC, CMP, magnesium, ferritin, folate, vitamin B12, vitamin D25OH)     --Repeat testing completed on 6/27/2022, results included: H/H11.6/35.3, MCV 9.5, PT/INR 1.3/1.1, APTT 27.5, potassium 3.7, creatinine 0.7, calcium 9, ALT 11, AST 14, magnesium 2.1, ferritin 25, folate >20, vitamin B12 458, vitamin D 37, , uric acid 3.3, TSH 1.47, free T4 0.97, free T3 2.2, TPO negative, hemoglobin A1c 4.5%, urinalysis concerning for infection, urine protein creatinine ratio 0.2, urine culture positive for E. Coli. --The patient's vitamin D improved. She was counseled to continue her vitamin D supplement. -- Repeat testing ordered  --Follow up with PCP for long term monitoring and management     16. Low ferritin -- The patient's ferritin was low at 21 (considered low if <15 in absence of anemia or <40 in setting of anemia)  --Ferrous sulfate 325 mg BID prescribed  --Monitor levels serially  --Monitor nutrition panel q4-6 weeks (CBC, CMP, magnesium, ferritin, folate, vitamin B12, vitamin D25OH)     --Repeat testing completed 6/27/2022, the patient's ferritin level increased to 25.   Her H/H was stable at scheduled for routine visits. Those weeks, she should see [facility] once and her referring provider once for twice-weekly testing. --I requested the patient return for a follow-up assessment in 3 days unless there is a clinical reason for her to return prior to that time. She is to call if she has any problems or questions prior to her next visit. Further evaluation and management will be dependent on her clinical presentation and the results of her testing. --The patient was advised to call if she has any increased vaginal discharge, vaginal bleeding, contractions, abdominal pain, back pain or any new significant symptomatology prior to her next visit. I advised her that these are signs and symptoms of cervical change and require follow-up assessment when they occur. Preeclampsia precautions were also reviewed with the patient. --The patient was also counseled to call and/or return with any concerns for decreased fetal activity. --The patient is to continue to follow with you in your office for ongoing obstetric care. --The total time spent on today's visit was 30 minutes. This included preparation for the visit (i.e. reviewing prior external notes and test results), performance of a medically appropriate history and examination, counseling, orders for medications, tests or other procedures, and coordination of care. Greater than 50% of the time was spent face-to-face with the patient. This time is exclusive of procedures performed. I answered all of  the patient's questions to her satisfaction. I asked her to call if she had any additional questions prior to her next visit. --At the conclusion of the visit, the patient appeared to have a good understanding of the issues discussed. I answered all of her questions to her satisfaction. I asked her to call if she had any additional questions prior to her next visit.       --Thank you for allowing me to participate in the care of this pleasant patient. Please don't hesitate to call me if you have any questions. Sincerely,      Terry Serrano MD, [address]  [phone]      *All or parts of this note may have been generated using a voice recognition program. There may be typo, grammar, or Word substitution errors that have escaped my review of this note.

## 2022-08-03 PROBLEM — R73.09 ABNORMAL GLUCOSE TOLERANCE TEST: Status: ACTIVE | Noted: 2022-08-03

## 2022-08-04 ENCOUNTER — ANCILLARY PROCEDURE (OUTPATIENT)
Dept: OBGYN CLINIC | Age: 42
End: 2022-08-04
Payer: COMMERCIAL

## 2022-08-04 ENCOUNTER — ROUTINE PRENATAL (OUTPATIENT)
Dept: OBGYN CLINIC | Age: 42
End: 2022-08-04
Payer: COMMERCIAL

## 2022-08-04 VITALS
DIASTOLIC BLOOD PRESSURE: 69 MMHG | SYSTOLIC BLOOD PRESSURE: 124 MMHG | BODY MASS INDEX: 32.19 KG/M2 | HEART RATE: 109 BPM | WEIGHT: 176 LBS

## 2022-08-04 DIAGNOSIS — O28.8 NON-REACTIVE NST (NON-STRESS TEST): ICD-10-CM

## 2022-08-04 DIAGNOSIS — O09.291 HISTORY OF MACROSOMIA IN INFANT IN PRIOR PREGNANCY, CURRENTLY PREGNANT IN FIRST TRIMESTER: ICD-10-CM

## 2022-08-04 DIAGNOSIS — Z3A.33 33 WEEKS GESTATION OF PREGNANCY: ICD-10-CM

## 2022-08-04 DIAGNOSIS — O09.523 ELDERLY MULTIGRAVIDA, THIRD TRIMESTER: Primary | ICD-10-CM

## 2022-08-04 LAB
GLUCOSE URINE, POC: NEGATIVE
PROTEIN UA: NEGATIVE

## 2022-08-04 PROCEDURE — 1036F TOBACCO NON-USER: CPT | Performed by: OBSTETRICS & GYNECOLOGY

## 2022-08-04 PROCEDURE — 76818 FETAL BIOPHYS PROFILE W/NST: CPT | Performed by: OBSTETRICS & GYNECOLOGY

## 2022-08-04 PROCEDURE — 99213 OFFICE O/P EST LOW 20 MIN: CPT | Performed by: OBSTETRICS & GYNECOLOGY

## 2022-08-04 PROCEDURE — 81002 URINALYSIS NONAUTO W/O SCOPE: CPT | Performed by: OBSTETRICS & GYNECOLOGY

## 2022-08-04 PROCEDURE — G8419 CALC BMI OUT NRM PARAM NOF/U: HCPCS | Performed by: OBSTETRICS & GYNECOLOGY

## 2022-08-04 PROCEDURE — 99212 OFFICE O/P EST SF 10 MIN: CPT | Performed by: OBSTETRICS & GYNECOLOGY

## 2022-08-04 PROCEDURE — 76820 UMBILICAL ARTERY ECHO: CPT | Performed by: OBSTETRICS & GYNECOLOGY

## 2022-08-04 PROCEDURE — 76815 OB US LIMITED FETUS(S): CPT | Performed by: OBSTETRICS & GYNECOLOGY

## 2022-08-04 PROCEDURE — G8427 DOCREV CUR MEDS BY ELIG CLIN: HCPCS | Performed by: OBSTETRICS & GYNECOLOGY

## 2022-08-04 PROCEDURE — 76821 MIDDLE CEREBRAL ARTERY ECHO: CPT | Performed by: OBSTETRICS & GYNECOLOGY

## 2022-08-04 NOTE — PROGRESS NOTES
NON STRESS TEST INTERPRETATION    22    RE:  Jignesh Hickman   : 1980   AGE: 46 y.o. GESTATIONAL AGE:  33w6d    DIAGNOSIS:   Advanced maternal age    INDICATION:  Advanced maternal age    TIME ON:        TIME OFF:        RESULT:   Not REACTIVE; 10 x 10 accelerations were noted. The tracing did not meet criteria for reactivity. No decelerations were noted. FHR Baseline Rate:   140 bpm    PERIODIC CHANGES:    Accelerations present, variability moderate, no decelerations noted    A biophysical profile was completed and scored 8/8.     COMMENTS:      She is to continue having NST's every 3-4 days, and BPP with umbilical artery doppler studies once per week        Primo Thomas MD, Melyssa Stapleton

## 2022-08-04 NOTE — PATIENT INSTRUCTIONS

## 2022-08-04 NOTE — LETTER
[facility] Maternal Fetal Medicine  [address]  [facility] [address]  Phone: [phone]  Fax: [phone]           Ino Wilson MD      2022     Patient: Sana Saeed   MR Number: [id number]   YOB: 1980   Date of Visit: 2022       Dear Dr. Cathy Acuña:    Thank you for referring Sana Saeed to me for evaluation/treatment. Below are the relevant portions of my assessment and plan of care. If you have questions, please do not hesitate to call me. I look forward to following [facility] along with you. Sincerely,        Ino Wilson MD    CC providers:  Dasia Negro MD  [address] [facility]  [address] [facility]       2022      Dasia Negro MD  [address]  [facility]  [address] [facility] [address]     RE:  Ashley Flood  : 1980   AGE: 46 y.o. This report has been created using voice recognition software. It may contain errors which are inherent in voice recognition technology. Dear Dr. Cathy Acuña:      I had the pleasure of meeting with Ms. Mcfarlane for fetal testing. As you know, Ms. Cholo Dye  is a 46 y.o.  at 33w6d ([address]) who is being followed by our office for advanced maternal age. Today, Ms. Mcfarlane reports that she feels well. She notes good fetal movement and denies any symptoms of leaking of fluid, vaginal bleeding, and/or contractions. She had a fetal ultrasound that was notable for the following. There is a single intrauterine gestation in a cephalic presentation with a heart rate of 140 beats per minute. The placenta is anterior. The amniotic fluid index is 13.5 cm. Biophysical profile score 8/10.   Points not given for nonstress test.  Umbilical artery PI normal.  MCA PSV normal.  CPR PI normal.      PERTINENT PHYSICAL EXAMINATION:   /69   Pulse (!) 109   Wt 176 lb (79.8 kg)   LMP 2021   BMI 32.19 kg/m²     Urine dipstick:   Negative for Glucose    Negative for Albumin      A fetal ultrasound assessment was performed today. A report is enclosed for your review. Assessment & Plan:  46 y.o.  at [address] [facility] ([place] [address]) with:    1. Fetal testing -- The patient presented to the office today for fetal testing secondary to advanced maternal age. The patient did not have any concerns today. The patient initially presented for a fetal nonstress test.  Monitoring was done for approximately 67 minutes. The fetal heart rate tracing was reassuring but not reactive; 10 x 10 accelerations were noted. The tracing did not meet criteria for reactivity. No decelerations were noted. A biophysical profile was ordered. The biophysical profile score was 8/10. Points were not ordered for the nonstress test.  Fetal testing was otherwise reassuring. The umbilical artery PI was normal and MCA PSV normal.    The nonstress test and ultrasound results were reviewed with patient. She was scheduled to return in 4 days for a follow-up consultation and fetal testing. --The patient was counseled to continue to monitor fetal kick counts daily. Instructions were reviewed. --I requested the patient return for a follow-up assessment in 4 days unless there is a clinical reason for her to return prior to that time. She is to call if she has any problems or questions prior to her next visit. Further evaluation and management will be dependent on her clinical presentation and the results of her testing. --The patient was advised to call if she has any increased vaginal discharge, vaginal bleeding, contractions, abdominal pain, back pain or any new significant symptomatology prior to her next visit. I advised her that these are signs and symptoms of cervical change and require follow-up assessment when they occur. Preeclampsia precautions were also reviewed with the patient. --The patient is to continue to follow with you in your office for ongoing obstetric care.     --The total time spent on today's visit was 15 minutes. This included preparation for the visit (i.e. reviewing prior external notes and test results), performance of a medically appropriate history and examination, counseling, orders for medications, tests or other procedures, and coordination of care. Greater than 50% of the time was spent face-to-face with the patient. This time is exclusive of procedures performed. I answered all of  the patient's questions to her satisfaction. I asked her to call if she had any additional questions prior to her next visit. --At the conclusion of the visit, the patient appeared to have a good understanding of the issues discussed. I answered all of her questions to her satisfaction. I asked her to call if she had any additional questions prior to her next visit. --Thank you for allowing me to participate in the care of this pleasant patient. Please don't hesitate to call me if you have any questions. Sincerely,      Rose Marie Alvarez MD, [address]  [phone]      *All or parts of this note may have been generated using a voice recognition program. There may be typo, grammar, or Word substitution errors that have escaped my review of this note.

## 2022-08-04 NOTE — PROGRESS NOTES
2022      Dorothy Murillo MD  Monica Burrell  [place],  [address]     RE:  Staci Espinosa  : 1980   AGE: 46 y.o. This report has been created using voice recognition software. It may contain errors which are inherent in voice recognition technology. Dear Dr. Yariel Brady:      I had the pleasure of meeting with Ms. Mcfarlane for fetal testing. As you know, Ms. Radha Hammond  is a 46 y.o.  at 33w6d ([address]) who is being followed by our office for advanced maternal age. Today, Ms. Mcfarlane reports that she feels well. She notes good fetal movement and denies any symptoms of leaking of fluid, vaginal bleeding, and/or contractions. She had a fetal ultrasound that was notable for the following. There is a single intrauterine gestation in a cephalic presentation with a heart rate of 140 beats per minute. The placenta is anterior. The amniotic fluid index is 13.5 cm. Biophysical profile score 8/10. Points not given for nonstress test.  Umbilical artery PI normal.  MCA PSV normal.  CPR PI normal.      PERTINENT PHYSICAL EXAMINATION:   /69   Pulse (!) 109   Wt 176 lb (79.8 kg)   LMP 2021   BMI 32.19 kg/m²     Urine dipstick:   Negative for Glucose    Negative for Albumin      A fetal ultrasound assessment was performed today. A report is enclosed for your review. Assessment & Plan:  46 y.o.  at [address] [facility] ([place] [address]) with:    1. Fetal testing -- The patient presented to the office today for fetal testing secondary to advanced maternal age. The patient did not have any concerns today. The patient initially presented for a fetal nonstress test.  Monitoring was done for approximately 67 minutes. The fetal heart rate tracing was reassuring but not reactive; 10 x 10 accelerations were noted. The tracing did not meet criteria for reactivity. No decelerations were noted. A biophysical profile was ordered. The biophysical profile score was 8/10.   Points were not ordered for the nonstress test.  Fetal testing was otherwise reassuring. The umbilical artery PI was normal and MCA PSV normal.    The nonstress test and ultrasound results were reviewed with patient. She was scheduled to return in 4 days for a follow-up consultation and fetal testing. --The patient was counseled to continue to monitor fetal kick counts daily. Instructions were reviewed. --I requested the patient return for a follow-up assessment in 4 days unless there is a clinical reason for her to return prior to that time. She is to call if she has any problems or questions prior to her next visit. Further evaluation and management will be dependent on her clinical presentation and the results of her testing. --The patient was advised to call if she has any increased vaginal discharge, vaginal bleeding, contractions, abdominal pain, back pain or any new significant symptomatology prior to her next visit. I advised her that these are signs and symptoms of cervical change and require follow-up assessment when they occur. Preeclampsia precautions were also reviewed with the patient. --The patient is to continue to follow with you in your office for ongoing obstetric care. --The total time spent on today's visit was 15 minutes. This included preparation for the visit (i.e. reviewing prior external notes and test results), performance of a medically appropriate history and examination, counseling, orders for medications, tests or other procedures, and coordination of care. Greater than 50% of the time was spent face-to-face with the patient. This time is exclusive of procedures performed. I answered all of  the patient's questions to her satisfaction. I asked her to call if she had any additional questions prior to her next visit. --At the conclusion of the visit, the patient appeared to have a good understanding of the issues discussed.   I answered all of her questions to her satisfaction. I asked her to call if she had any additional questions prior to her next visit. --Thank you for allowing me to participate in the care of this pleasant patient. Please don't hesitate to call me if you have any questions. Sincerely,      Yoli Toro MD, [address]  [phone]      *All or parts of this note may have been generated using a voice recognition program. There may be typo, grammar, or Word substitution errors that have escaped my review of this note.

## 2022-08-08 ENCOUNTER — ROUTINE PRENATAL (OUTPATIENT)
Dept: OBGYN CLINIC | Age: 42
End: 2022-08-08
Payer: COMMERCIAL

## 2022-08-08 VITALS
WEIGHT: 178 LBS | BODY MASS INDEX: 32.56 KG/M2 | SYSTOLIC BLOOD PRESSURE: 128 MMHG | HEART RATE: 87 BPM | DIASTOLIC BLOOD PRESSURE: 77 MMHG

## 2022-08-08 DIAGNOSIS — O09.523 ELDERLY MULTIGRAVIDA, THIRD TRIMESTER: ICD-10-CM

## 2022-08-08 DIAGNOSIS — Z86.2 HISTORY OF ANEMIA: ICD-10-CM

## 2022-08-08 DIAGNOSIS — O09.291 HISTORY OF MACROSOMIA IN INFANT IN PRIOR PREGNANCY, CURRENTLY PREGNANT IN FIRST TRIMESTER: Primary | ICD-10-CM

## 2022-08-08 DIAGNOSIS — Z57.9 OCCUPATIONAL EXPOSURE IN WORKPLACE: ICD-10-CM

## 2022-08-08 PROCEDURE — 81002 URINALYSIS NONAUTO W/O SCOPE: CPT | Performed by: OBSTETRICS & GYNECOLOGY

## 2022-08-08 PROCEDURE — 59025 FETAL NON-STRESS TEST: CPT | Performed by: OBSTETRICS & GYNECOLOGY

## 2022-08-08 PROCEDURE — 99213 OFFICE O/P EST LOW 20 MIN: CPT | Performed by: OBSTETRICS & GYNECOLOGY

## 2022-08-08 PROCEDURE — 99999 PR OFFICE/OUTPT VISIT,PROCEDURE ONLY: CPT | Performed by: OBSTETRICS & GYNECOLOGY

## 2022-08-08 SDOH — HEALTH STABILITY - PHYSICAL HEALTH: OCCUPATIONAL EXPOSURE TO UNSPECIFIED RISK FACTOR: Z57.9

## 2022-08-08 NOTE — PROGRESS NOTES
NON STRESS TEST INTERPRETATION    22    RE:  Rachelle Reynolds   : 1980   AGE: 46 y.o.     GESTATIONAL AGE:  34w3d    DIAGNOSIS:   AMA, history of  x3    INDICATION:  AMA, history of  x3    TIME ON:  0848      TIME OFF:  0932      RESULT:   REACTIVE      FHR Baseline Rate:   135 bpm    PERIODIC CHANGES:    Accelerations present, variability moderate, no decelerations noted    COMMENTS:      She is to continue having NST's every 3-4 days, and BPP with umbilical artery doppler studies once per week        Xenia Benoit MD, [address] [place] [address]

## 2022-08-11 ENCOUNTER — ROUTINE PRENATAL (OUTPATIENT)
Dept: OBGYN | Age: 42
End: 2022-08-11
Payer: COMMERCIAL

## 2022-08-11 VITALS
HEART RATE: 88 BPM | BODY MASS INDEX: 32.56 KG/M2 | SYSTOLIC BLOOD PRESSURE: 132 MMHG | WEIGHT: 178 LBS | DIASTOLIC BLOOD PRESSURE: 68 MMHG

## 2022-08-11 DIAGNOSIS — O09.523 MULTIGRAVIDA OF ADVANCED MATERNAL AGE IN THIRD TRIMESTER: ICD-10-CM

## 2022-08-11 DIAGNOSIS — Z34.93 PRENATAL CARE IN THIRD TRIMESTER: Primary | ICD-10-CM

## 2022-08-11 DIAGNOSIS — O09.293 HISTORY OF MACROSOMIA IN INFANT IN PRIOR PREGNANCY, CURRENTLY PREGNANT IN THIRD TRIMESTER: ICD-10-CM

## 2022-08-11 LAB
GLUCOSE URINE, POC: NORMAL
GLUCOSE URINE, POC: NORMAL
PROTEIN UA: NEGATIVE
PROTEIN UA: NEGATIVE

## 2022-08-11 PROCEDURE — 81002 URINALYSIS NONAUTO W/O SCOPE: CPT | Performed by: OBSTETRICS & GYNECOLOGY

## 2022-08-11 PROCEDURE — G8427 DOCREV CUR MEDS BY ELIG CLIN: HCPCS | Performed by: OBSTETRICS & GYNECOLOGY

## 2022-08-11 PROCEDURE — 99212 OFFICE O/P EST SF 10 MIN: CPT | Performed by: OBSTETRICS & GYNECOLOGY

## 2022-08-11 PROCEDURE — 99213 OFFICE O/P EST LOW 20 MIN: CPT | Performed by: OBSTETRICS & GYNECOLOGY

## 2022-08-11 PROCEDURE — 1036F TOBACCO NON-USER: CPT | Performed by: OBSTETRICS & GYNECOLOGY

## 2022-08-11 PROCEDURE — G8419 CALC BMI OUT NRM PARAM NOF/U: HCPCS | Performed by: OBSTETRICS & GYNECOLOGY

## 2022-08-11 NOTE — PROGRESS NOTES
Here for routine prenatal visit. Doing well, without any complaints or problems. Baby active. See weekly to delivery, cultures next week.

## 2022-08-15 ENCOUNTER — ROUTINE PRENATAL (OUTPATIENT)
Dept: OBGYN CLINIC | Age: 42
End: 2022-08-15
Payer: COMMERCIAL

## 2022-08-15 ENCOUNTER — ANCILLARY PROCEDURE (OUTPATIENT)
Dept: OBGYN CLINIC | Age: 42
End: 2022-08-15
Payer: COMMERCIAL

## 2022-08-15 VITALS
DIASTOLIC BLOOD PRESSURE: 75 MMHG | HEART RATE: 84 BPM | WEIGHT: 176.7 LBS | BODY MASS INDEX: 32.32 KG/M2 | SYSTOLIC BLOOD PRESSURE: 115 MMHG

## 2022-08-15 DIAGNOSIS — O34.219 PREVIOUS CESAREAN DELIVERY, ANTEPARTUM CONDITION OR COMPLICATION: ICD-10-CM

## 2022-08-15 DIAGNOSIS — O09.523 ELDERLY MULTIGRAVIDA, THIRD TRIMESTER: Primary | ICD-10-CM

## 2022-08-15 DIAGNOSIS — Z3A.35 35 WEEKS GESTATION OF PREGNANCY: ICD-10-CM

## 2022-08-15 DIAGNOSIS — O99.213 OBESITY AFFECTING PREGNANCY IN THIRD TRIMESTER: ICD-10-CM

## 2022-08-15 LAB
GLUCOSE URINE, POC: NEGATIVE
PROTEIN UA: NEGATIVE

## 2022-08-15 PROCEDURE — 99213 OFFICE O/P EST LOW 20 MIN: CPT | Performed by: OBSTETRICS & GYNECOLOGY

## 2022-08-15 PROCEDURE — 1036F TOBACCO NON-USER: CPT | Performed by: OBSTETRICS & GYNECOLOGY

## 2022-08-15 PROCEDURE — 76818 FETAL BIOPHYS PROFILE W/NST: CPT | Performed by: OBSTETRICS & GYNECOLOGY

## 2022-08-15 PROCEDURE — 81002 URINALYSIS NONAUTO W/O SCOPE: CPT | Performed by: OBSTETRICS & GYNECOLOGY

## 2022-08-15 PROCEDURE — 76820 UMBILICAL ARTERY ECHO: CPT | Performed by: OBSTETRICS & GYNECOLOGY

## 2022-08-15 PROCEDURE — 76816 OB US FOLLOW-UP PER FETUS: CPT | Performed by: OBSTETRICS & GYNECOLOGY

## 2022-08-15 PROCEDURE — G8427 DOCREV CUR MEDS BY ELIG CLIN: HCPCS | Performed by: OBSTETRICS & GYNECOLOGY

## 2022-08-15 PROCEDURE — G8419 CALC BMI OUT NRM PARAM NOF/U: HCPCS | Performed by: OBSTETRICS & GYNECOLOGY

## 2022-08-15 NOTE — PROGRESS NOTES
nst started @ 1441. Pt instructed to belinda fetal movement  FHTs 130s with accels present, moderate variability and no decels  Ended @ 1510.   Reactive per Dr Ryan Venegas

## 2022-08-15 NOTE — PROGRESS NOTES
mouth daily, Disp: 60 tablet, Rfl: 5    ferrous sulfate (IRON 325) 325 (65 Fe) MG tablet, Take 1 tablet by mouth 2 times daily, Disp: 60 tablet, Rfl: 5    famotidine (PEPCID) 20 MG tablet, Take 1 tablet by mouth 2 times daily, Disp: 60 tablet, Rfl: 3    ondansetron (ZOFRAN) 4 MG tablet, Take 1 tablet by mouth every 8 hours as needed for Nausea or Vomiting, Disp: 40 tablet, Rfl: 0    aspirin (ASPIRIN 81) 81 MG chewable tablet, Take 1 tablet by mouth daily, Disp: 30 tablet, Rfl: 6    Prenatal Vit-Fe Fumarate-FA (PRENATAL VITAMIN PO), Take 1 tablet by mouth daily, Disp: , Rfl:     SOCIAL  HISTORY: Denies smoking, Alcohol and Drug abuse. REVIEW OF SYSTEMS:    CONSTITUTIONAL : No fever, no chills   HEENT :  No headache, no visual changes, no rhinorrhea, no sore throat   CARDIOVASCULAR :  No pain, no palpitations, no edema   RESPIRATORY :  No pain, no shortness of breath   GASTROINTESTINAL : No N/V, no D/C, no abdominal pain   GENITOURINARY :  No dysuria, hematuria and no incontinence   MUSCULOSKELETAL:  No myalgia, No back pain  NEUROLOGICAL :  No numbness, no tingling, no tremors. No history of seizures    FAMILY MEDICAL HISTORY:   Negative for birth defects, chromosomal anomalies and Mental retardation. OB Genetic Screening    Patient's Age 35+ at Date of Delivery Yes     Cystic Fibrosis No     Thalassemia MCV<80 No     [place] Chorea No     Neural Tube Defect No     Mental Retardation/Autism No     Congenital Heart Defect No     Was Person Treated for Fragilex? No     Down Syndrome No     Other Inherited Genetic Chromosomal Disorder? No     Rodrigo-Sachs No     Maternal Metabolic Disorder No     Patient or [de-identified] Father Had Other Defects? No     Recurrent Pregnancy Loss or Still Birth?  Yes Mother of patient - one miscarriage and one ectopic    Sickle Cell Disease or Trait No     Hemophilia No     Muscular Dystrophy No        OB Infection History    Blood Type A Positive     Live with Someone with or Exposed to TB? No     History of STD/GC/Chlamydia/HPV/Syphilis? Yes HPV    Patient or Partner has Hx of Genital Herpes? No     Rash or Viral Illness Since LMP? No        Mrs Brittny Amaya had an uneventful course of pregnancy so far. When seen today in our office she had no complaints. PHYSICAL EXAMINATION:    General Appearance:  Healthy looking, alert, no acute distress. Eyes:     No pallor, no icterus, no photophobia. Ears:     No ear drainage. Nose:     No nasal drainage, no paranasal sinus tenderness. Throat:   Mucosa moist, no oral thrush, no exudate. Neck:     No nuchal rigidity. Back:     No CVA tenderness. Abdomen:    Soft nontender. Extremities:    No pretibial pitting edema, no calf muscle tenderness. Skin:     No rashes, no lesions. BP: 115/75 Weight: 176 lb 11.2 oz (80.2 kg)   Heart Rate: 84     Body mass index is 32.32 kg/m². Urine dipstick:  Glucose : Negative   Albumin:  Negative       An ultrasound evaluation was done in our office today. I reviewed the ultrasound pictures stored in the hard drive of the ultrasound machine with the patient. Please refer to the enclosed copy of the ultrasound report for further information. Prenatal chart and Lab Work Review:    I reviewed with the patient the result of the:  Two hour GTT collected on 2022 that ruled out gestational diabetes. Latest Reference Range & Units 22 09:00   Glucose, GTT - 1 Hour mg/dL 171   Glucose, GTT - 2 Hour mg/dL 121   Glucose, GTT - Fasting mg/dL 85       IMPRESSION:  A 35w3d intrauterine pregnancy. Elderly multigravida. Three previous  deliveries. Maternal obesity. RECOMMENDATIONS/PLAN:  I discussed with the patient the following points: The benefits and limitations of ultrasound in prenatal diagnosis. Some defects might not always be seen by ultrasound. Estimated incidence of these defects in the general population is 2- 4%. No structural  anomalies are noted.  Only genetic amniocentesis can rule out fetal chromosome anomalies. Normal ultrasound does not. The size of her baby is appropriate for gestational age. Obesity is assosiated with an increased risk of developing gestational diabetes, and disturbance in the growth of her baby, such as Large for dates and small for Dates. Gestational diabetes was ruled out with a negative two hour Glucose tolerance test collected on 7/13/2022. Fetal well-being was confirmed today. The amount of fluid around baby is normal.  The Biophysical profile score of 10/10 is reassuring, and the umbilical artery Doppler studies are normal.  She should monitor fetal well-being at home by counting movements after dinner. Her baby should  move 10 times in 2 hours; otherwise, she should call your office immediately. She is also to call, if she develops any headaches, blurred vision, abdominal pain, bleeding, or spotting, which are signs of preeclampsia. She is to continue to follow with you in your office for ongoing obstetric care, and continue to be monitored with nonstress tests every 3 to 4 days and biophysical profiles once a week for remainder of pregnancy. Thank you again, doctor, for allowing us to be of service to your patient. If I can be of further assistance, please do not hesitate to call. Sincerely,        Alex Gregorio M.D., [address] [facility]    The total time in minutes spent reviewing medical records, reviewing imaging studies, performing ultrasonic imaging, reviewing laboratory testing, and documenting information was over 25  minutes, of which, 50% of the time was spent in patient education, counseling, and coordinating care with the patient, her provider, and/or her family. I answered all of her questions to her satisfaction.     Current encounter billing:  IN OFFICE OUTPATIENT VISIT LOW MDM 20-30 MINUTES [[id number]]  US OB Follow Up Transabdominal Approach [EXK627 Custom]  Biophysical profile [OBO12 Custom]  US Doppler Fetal Umbilical Artery [[id number] Custom]    **This report has been created using voice recognition software. It may contain minor errors     which are inherent in voice recognition technology**                NON STRESS TEST INTERPRETATION    8/15/22    RE:  Esha Voss   : 1980   AGE: 46 y.o. GESTATIONAL AGE:  35w3d    DIAGNOSIS:    Advanced maternal age     [de-identified] previous  deliveries.     INDICATION:  Advanced maternal age       TIME ON:  2:41 PM      TIME OFF:  3:10 PM      RESULT:   REACTIVE      FHR Baseline Rate:   130 bpm    PERIODIC CHANGES:    Accelerations present, variability moderate, no decelerations noted    COMMENTS:      She is to continue having NST's every 3-4 days, and BPP with umbilical artery doppler studies once per week    Bharat Green MD

## 2022-08-15 NOTE — PATIENT INSTRUCTIONS
Please arrive for your scheduled appointment at least 15 minutes early with your actual insurance card+ a photo ID. Also if you need any refills ordered or have questions, it may take up 48 hours to reply. Please allow ample time for your refills. Call me when you use last refill. Thank you for your cooperation. You might be having an NST at your next appt. Please eat a large snack or breakfast before coming to office. Thank you Call your primary obstetrician with bleeding, leaking of fluid, abdominal tenderness, headache, blurry vision, epigastric pain and increased urinary frequency. If you are experiencing an emergency and need immediate help, call 911 or go to go emergency room or labor and delivery. Do kick counts after dinner. Call your primary obstetrician if less than 10 kicks in 2 hours after dinner. Call your primary obstetrician with bleeding, leaking of fluid, abdominal tenderness, headache, blurry vision, epigastric pain and increased urinary frequency. Beatris Winn

## 2022-08-15 NOTE — LETTER
8/15/22    RE:  Esha Voss   : 1980   AGE: 46 y.o. REFERRING PHYSICIANs:           DANIEL Cárdenas MD                 Dear Drs. Ms Esha Voss a 46 y.o.  [id number] is followed up in our [facility] office by Dr. Santana Welch. REASON FOR VISIT:   Follow-up on a pregnant patient with advanced maternal age, obesity, and history of 3 previous  deliveries. Mrs Esha Voss gave the following history when I saw her today:    OB History    Para Term  AB Living   4 3 3 0 0 3   SAB IAB Ectopic Molar Multiple Live Births   0 0 0 0 0 3      # Outcome Date GA Lbr Jose Francisco/2nd Weight Sex Delivery Anes PTL Lv   4 Current            3 Term 02/18/15 39w0d  9 lb 1 oz (4.111 kg) M CS-LTranv Spinal N MACKENZIE      Apgar1: 9  Apgar5: 9   2 Term 06/10/11 38w0d  7 lb 3 oz (3.26 kg) M CS-LTranv Spinal N MACKEZNIE   1 Term 04/15/05 40w0d  7 lb 3.5 oz (3.274 kg) F CS-LTranv Spinal N MACKENZIE     PAST GYNECOLOGICAL  HISTORY:  Positive for HPV, and abnormal pap smears requiring evaluation with a colposcopy, resolved. Negative for other sexually transmitted diseases. PAST MEDICAL HISTORY:  Past Medical History:   Diagnosis Date    Abnormal Pap smear of cervix     Depression     Postpartum depression     Negative for Hypertension, Diabetes, Thyroid disease , Asthma or Heart disease. PAST SURGICAL HISTORY:  Past Surgical History:   Procedure Laterality Date     SECTION      HERNIA REPAIR      TONSILLECTOMY AND ADENOIDECTOMY      TYMPANOSTOMY TUBE PLACEMENT     Negative for Appendectomy, Cholecystectomy or surgery on the cervix such as LEEP, [facility] or Cryotherapy.     ALLERGIES:    No Known Allergies      Current Outpatient Medications:     docusate sodium (COLACE) 100 MG capsule, , Disp: , Rfl:     bisacodyl (DULCOLAX) 5 MG EC tablet, Take 5 mg by mouth daily as needed for Constipation, Disp: , Rfl:     vitamin D3 (CHOLECALCIFEROL) 25 MCG (1000 UT) TABS tablet, Take 2 tablets by mouth daily, Disp: 60 tablet, Rfl: 5    ferrous sulfate (IRON 325) 325 (65 Fe) MG tablet, Take 1 tablet by mouth 2 times daily, Disp: 60 tablet, Rfl: 5    famotidine (PEPCID) 20 MG tablet, Take 1 tablet by mouth 2 times daily, Disp: 60 tablet, Rfl: 3    ondansetron (ZOFRAN) 4 MG tablet, Take 1 tablet by mouth every 8 hours as needed for Nausea or Vomiting, Disp: 40 tablet, Rfl: 0    aspirin (ASPIRIN 81) 81 MG chewable tablet, Take 1 tablet by mouth daily, Disp: 30 tablet, Rfl: 6    Prenatal Vit-Fe Fumarate-FA (PRENATAL VITAMIN PO), Take 1 tablet by mouth daily, Disp: , Rfl:     SOCIAL  HISTORY: Denies smoking, Alcohol and Drug abuse. REVIEW OF SYSTEMS:    CONSTITUTIONAL : No fever, no chills   HEENT :  No headache, no visual changes, no rhinorrhea, no sore throat   CARDIOVASCULAR :  No pain, no palpitations, no edema   RESPIRATORY :  No pain, no shortness of breath   GASTROINTESTINAL : No N/V, no D/C, no abdominal pain   GENITOURINARY :  No dysuria, hematuria and no incontinence   MUSCULOSKELETAL:  No myalgia, No back pain  NEUROLOGICAL :  No numbness, no tingling, no tremors. No history of seizures    FAMILY MEDICAL HISTORY:   Negative for birth defects, chromosomal anomalies and Mental retardation. OB Genetic Screening    Patient's Age 35+ at Date of Delivery Yes     Cystic Fibrosis No     Thalassemia MCV<80 No     [place] Chorea No     Neural Tube Defect No     Mental Retardation/Autism No     Congenital Heart Defect No     Was Person Treated for Fragilex? No     Down Syndrome No     Other Inherited Genetic Chromosomal Disorder? No     Rodrigo-Sachs No     Maternal Metabolic Disorder No     Patient or [de-identified] Father Had Other Defects? No     Recurrent Pregnancy Loss or Still Birth?  Yes Mother of patient - one miscarriage and one ectopic    Sickle Cell Disease or Trait No     Hemophilia No     Muscular Dystrophy No        OB Infection History    Blood Type A Positive     Live with Someone with or Exposed to TB? No     History of STD/GC/Chlamydia/HPV/Syphilis? Yes HPV    Patient or Partner has Hx of Genital Herpes? No     Rash or Viral Illness Since LMP? No        Mrs Micah Whittington had an uneventful course of pregnancy so far. When seen today in our office she had no complaints. PHYSICAL EXAMINATION:    General Appearance:  Healthy looking, alert, no acute distress. Eyes:     No pallor, no icterus, no photophobia. Ears:     No ear drainage. Nose:     No nasal drainage, no paranasal sinus tenderness. Throat:   Mucosa moist, no oral thrush, no exudate. Neck:     No nuchal rigidity. Back:     No CVA tenderness. Abdomen:    Soft nontender. Extremities:    No pretibial pitting edema, no calf muscle tenderness. Skin:     No rashes, no lesions. BP: 115/75 Weight: 176 lb 11.2 oz (80.2 kg)   Heart Rate: 84     Body mass index is 32.32 kg/m². Urine dipstick:  Glucose : Negative   Albumin:  Negative       An ultrasound evaluation was done in our office today. I reviewed the ultrasound pictures stored in the hard drive of the ultrasound machine with the patient. Please refer to the enclosed copy of the ultrasound report for further information. Prenatal chart and Lab Work Review:    I reviewed with the patient the result of the:  · Two hour GTT collected on 2022 that ruled out gestational diabetes. Latest Reference Range & Units 22 09:00   Glucose, GTT - 1 Hour mg/dL 171   Glucose, GTT - 2 Hour mg/dL 121   Glucose, GTT - Fasting mg/dL 85       IMPRESSION:  1. A 35w3d intrauterine pregnancy. 2. Elderly multigravida. 3. Three previous  deliveries. 4. Maternal obesity. RECOMMENDATIONS/PLAN:  I discussed with the patient the following points:    1. The benefits and limitations of ultrasound in prenatal diagnosis. Some defects might not always be seen by ultrasound. Estimated incidence of these defects in the general population is 2- 4%.   2. No structural  anomalies are noted. Only genetic amniocentesis can rule out fetal chromosome anomalies. Normal ultrasound does not. 3. The size of her baby is appropriate for gestational age. 4. Obesity is assosiated with an increased risk of developing gestational diabetes, and disturbance in the growth of her baby, such as Large for dates and small for Dates. Gestational diabetes was ruled out with a negative two hour Glucose tolerance test collected on 7/13/2022.   5. Fetal well-being was confirmed today. The amount of fluid around baby is normal.  The Biophysical profile score of 10/10 is reassuring, and the umbilical artery Doppler studies are normal.  6. She should monitor fetal well-being at home by counting movements after dinner. Her baby should  move 10 times in 2 hours; otherwise, she should call your office immediately. She is also to call, if she develops any headaches, blurred vision, abdominal pain, bleeding, or spotting, which are signs of preeclampsia. 7. She is to continue to follow with you in your office for ongoing obstetric care, and continue to be monitored with nonstress tests every 3 to 4 days and biophysical profiles once a week for remainder of pregnancy. Thank you again, doctor, for allowing us to be of service to your patient. If I can be of further assistance, please do not hesitate to call. Sincerely,        Naomy Andrade M.D., [address]    The total time in minutes spent reviewing medical records, reviewing imaging studies, performing ultrasonic imaging, reviewing laboratory testing, and documenting information was over 25  minutes, of which, 50% of the time was spent in patient education, counseling, and coordinating care with the patient, her provider, and/or her family. I answered all of her questions to her satisfaction.     Current encounter billing:  [place] OFFICE OUTPATIENT VISIT LOW MDM 20-30 MINUTES [[id number]]  US OB Follow Up Transabdominal Approach [RLW927 Custom]  Biophysical profile [OBO12 Custom]  US Doppler Fetal Umbilical Artery [[id number] Custom]    **This report has been created using voice recognition software. It may contain minor errors     which are inherent in voice recognition technology**                NON STRESS TEST INTERPRETATION    8/15/22    RE:  Ramu Daiglepapo   : 1980   AGE: 46 y.o. GESTATIONAL AGE:  35w3d    DIAGNOSIS:    Advanced maternal age     [de-identified] previous  deliveries.     INDICATION:  Advanced maternal age       TIME ON:  2:41 PM      TIME OFF:  3:10 PM      RESULT:   REACTIVE      FHR Baseline Rate:   130 bpm    PERIODIC CHANGES:    · Accelerations present, variability moderate, no decelerations noted    COMMENTS:      She is to continue having NST's every 3-4 days, and BPP with umbilical artery doppler studies once per week    Mina Deleon MD

## 2022-08-22 ENCOUNTER — HOSPITAL ENCOUNTER (OUTPATIENT)
Age: 42
Discharge: HOME | End: 2022-08-22
Payer: COMMERCIAL

## 2022-08-22 ENCOUNTER — ANCILLARY PROCEDURE (OUTPATIENT)
Dept: OBGYN CLINIC | Age: 42
End: 2022-08-22
Payer: COMMERCIAL

## 2022-08-22 ENCOUNTER — ROUTINE PRENATAL (OUTPATIENT)
Dept: OBGYN CLINIC | Age: 42
End: 2022-08-22
Payer: COMMERCIAL

## 2022-08-22 ENCOUNTER — ROUTINE PRENATAL (OUTPATIENT)
Dept: OBGYN | Age: 42
End: 2022-08-22
Payer: COMMERCIAL

## 2022-08-22 VITALS
WEIGHT: 178 LBS | HEART RATE: 91 BPM | BODY MASS INDEX: 32.56 KG/M2 | DIASTOLIC BLOOD PRESSURE: 60 MMHG | SYSTOLIC BLOOD PRESSURE: 112 MMHG

## 2022-08-22 VITALS
SYSTOLIC BLOOD PRESSURE: 109 MMHG | BODY MASS INDEX: 32.44 KG/M2 | WEIGHT: 177.38 LBS | HEART RATE: 79 BPM | DIASTOLIC BLOOD PRESSURE: 78 MMHG

## 2022-08-22 DIAGNOSIS — O09.523 ELDERLY MULTIGRAVIDA, THIRD TRIMESTER: ICD-10-CM

## 2022-08-22 DIAGNOSIS — Z3A.36 36 WEEKS GESTATION OF PREGNANCY: ICD-10-CM

## 2022-08-22 DIAGNOSIS — Z86.2 HISTORY OF ANEMIA: ICD-10-CM

## 2022-08-22 DIAGNOSIS — K59.00 CONSTIPATION, UNSPECIFIED CONSTIPATION TYPE: ICD-10-CM

## 2022-08-22 DIAGNOSIS — R79.0 LOW FERRITIN: ICD-10-CM

## 2022-08-22 DIAGNOSIS — R73.09 ABNORMAL GLUCOSE TOLERANCE TEST: ICD-10-CM

## 2022-08-22 DIAGNOSIS — R79.89 LOW VITAMIN D LEVEL: ICD-10-CM

## 2022-08-22 DIAGNOSIS — O09.291 HISTORY OF MACROSOMIA IN INFANT IN PRIOR PREGNANCY, CURRENTLY PREGNANT IN FIRST TRIMESTER: ICD-10-CM

## 2022-08-22 DIAGNOSIS — Z3A.36 36 WEEKS GESTATION OF PREGNANCY: Primary | ICD-10-CM

## 2022-08-22 DIAGNOSIS — R80.9 URINE PROTEIN INCREASED: ICD-10-CM

## 2022-08-22 DIAGNOSIS — O09.523 ELDERLY MULTIGRAVIDA, THIRD TRIMESTER: Primary | ICD-10-CM

## 2022-08-22 LAB
ALBUMIN SERPL-MCNC: 3.7 G/DL (ref 3.5–5.2)
ALP BLD-CCNC: 116 U/L (ref 35–104)
ALT SERPL-CCNC: 11 U/L (ref 0–32)
ANION GAP SERPL CALCULATED.3IONS-SCNC: 12 MMOL/L (ref 7–16)
AST SERPL-CCNC: 17 U/L (ref 0–31)
BACTERIA: NORMAL /HPF
BASOPHILS ABSOLUTE: 0.1 E9/L (ref 0–0.2)
BASOPHILS RELATIVE PERCENT: 0.8 % (ref 0–2)
BILIRUB SERPL-MCNC: 0.3 MG/DL (ref 0–1.2)
BILIRUBIN URINE: NEGATIVE
BLOOD, URINE: NEGATIVE
BUN BLDV-MCNC: 10 MG/DL (ref 6–20)
CALCIUM SERPL-MCNC: 9.4 MG/DL (ref 8.6–10.2)
CHLORIDE BLD-SCNC: 99 MMOL/L (ref 98–107)
CLARITY: CLEAR
CO2: 23 MMOL/L (ref 22–29)
COLOR: YELLOW
CREAT SERPL-MCNC: 0.6 MG/DL (ref 0.5–1)
CREATININE URINE: 35 MG/DL (ref 29–226)
EOSINOPHILS ABSOLUTE: 0.4 E9/L (ref 0.05–0.5)
EOSINOPHILS RELATIVE PERCENT: 3 % (ref 0–6)
FERRITIN: 32 NG/ML
FOLATE: 13.2 NG/ML (ref 4.8–24.2)
GFR AFRICAN AMERICAN: >60
GFR NON-AFRICAN AMERICAN: >60 ML/MIN/1.73
GLUCOSE BLD-MCNC: 85 MG/DL (ref 74–99)
GLUCOSE URINE, POC: NEGATIVE
GLUCOSE URINE, POC: NEGATIVE
GLUCOSE URINE: NEGATIVE MG/DL
HBA1C MFR BLD: 5.1 % (ref 4–5.6)
HCT VFR BLD CALC: 34.2 % (ref 34–48)
HEMOGLOBIN: 11.6 G/DL (ref 11.5–15.5)
IMMATURE GRANULOCYTES #: 0.34 E9/L
IMMATURE GRANULOCYTES %: 2.6 % (ref 0–5)
KETONES, URINE: NEGATIVE MG/DL
LACTATE DEHYDROGENASE: 174 U/L (ref 135–214)
LEUKOCYTE ESTERASE, URINE: NEGATIVE
LYMPHOCYTES ABSOLUTE: 2.35 E9/L (ref 1.5–4)
LYMPHOCYTES RELATIVE PERCENT: 17.7 % (ref 20–42)
MAGNESIUM: 1.8 MG/DL (ref 1.6–2.6)
MCH RBC QN AUTO: 29.7 PG (ref 26–35)
MCHC RBC AUTO-ENTMCNC: 33.9 % (ref 32–34.5)
MCV RBC AUTO: 87.7 FL (ref 80–99.9)
MONOCYTES ABSOLUTE: 0.81 E9/L (ref 0.1–0.95)
MONOCYTES RELATIVE PERCENT: 6.1 % (ref 2–12)
NEUTROPHILS ABSOLUTE: 9.27 E9/L (ref 1.8–7.3)
NEUTROPHILS RELATIVE PERCENT: 69.8 % (ref 43–80)
NITRITE, URINE: NEGATIVE
PDW BLD-RTO: 13.4 FL (ref 11.5–15)
PH UA: 6 (ref 5–9)
PLATELET # BLD: 308 E9/L (ref 130–450)
PMV BLD AUTO: 10.2 FL (ref 7–12)
POTASSIUM SERPL-SCNC: 4 MMOL/L (ref 3.5–5)
PROTEIN PROTEIN: 5 MG/DL (ref 0–12)
PROTEIN UA: NEGATIVE
PROTEIN UA: NEGATIVE MG/DL
PROTEIN UA: POSITIVE
PROTEIN/CREAT RATIO: 0.1
PROTEIN/CREAT RATIO: 0.1 (ref 0–0.2)
RBC # BLD: 3.9 E12/L (ref 3.5–5.5)
RBC UA: NORMAL /HPF (ref 0–2)
SODIUM BLD-SCNC: 134 MMOL/L (ref 132–146)
SPECIFIC GRAVITY UA: <=1.005 (ref 1–1.03)
T3 FREE: 2.5 PG/ML (ref 2–4.4)
T4 FREE: 0.95 NG/DL (ref 0.93–1.7)
TOTAL PROTEIN: 6.7 G/DL (ref 6.4–8.3)
TSH SERPL DL<=0.05 MIU/L-ACNC: 2.55 UIU/ML (ref 0.27–4.2)
URIC ACID, SERUM: 4.4 MG/DL (ref 2.4–5.7)
UROBILINOGEN, URINE: 0.2 E.U./DL
VITAMIN B-12: 339 PG/ML (ref 211–946)
VITAMIN D 25-HYDROXY: 34 NG/ML (ref 30–100)
WBC # BLD: 13.3 E9/L (ref 4.5–11.5)
WBC UA: NORMAL /HPF (ref 0–5)

## 2022-08-22 PROCEDURE — 81002 URINALYSIS NONAUTO W/O SCOPE: CPT | Performed by: STUDENT IN AN ORGANIZED HEALTH CARE EDUCATION/TRAINING PROGRAM

## 2022-08-22 PROCEDURE — 84439 ASSAY OF FREE THYROXINE: CPT

## 2022-08-22 PROCEDURE — 85025 COMPLETE CBC W/AUTO DIFF WBC: CPT

## 2022-08-22 PROCEDURE — 80053 COMPREHEN METABOLIC PANEL: CPT

## 2022-08-22 PROCEDURE — 82607 VITAMIN B-12: CPT

## 2022-08-22 PROCEDURE — 81002 URINALYSIS NONAUTO W/O SCOPE: CPT | Performed by: OBSTETRICS & GYNECOLOGY

## 2022-08-22 PROCEDURE — 84550 ASSAY OF BLOOD/URIC ACID: CPT

## 2022-08-22 PROCEDURE — 1036F TOBACCO NON-USER: CPT | Performed by: OBSTETRICS & GYNECOLOGY

## 2022-08-22 PROCEDURE — 99213 OFFICE O/P EST LOW 20 MIN: CPT | Performed by: STUDENT IN AN ORGANIZED HEALTH CARE EDUCATION/TRAINING PROGRAM

## 2022-08-22 PROCEDURE — 82728 ASSAY OF FERRITIN: CPT

## 2022-08-22 PROCEDURE — 83036 HEMOGLOBIN GLYCOSYLATED A1C: CPT

## 2022-08-22 PROCEDURE — G8419 CALC BMI OUT NRM PARAM NOF/U: HCPCS | Performed by: OBSTETRICS & GYNECOLOGY

## 2022-08-22 PROCEDURE — 76821 MIDDLE CEREBRAL ARTERY ECHO: CPT | Performed by: OBSTETRICS & GYNECOLOGY

## 2022-08-22 PROCEDURE — 83735 ASSAY OF MAGNESIUM: CPT

## 2022-08-22 PROCEDURE — 87088 URINE BACTERIA CULTURE: CPT

## 2022-08-22 PROCEDURE — 82306 VITAMIN D 25 HYDROXY: CPT

## 2022-08-22 PROCEDURE — 82746 ASSAY OF FOLIC ACID SERUM: CPT

## 2022-08-22 PROCEDURE — 76820 UMBILICAL ARTERY ECHO: CPT | Performed by: OBSTETRICS & GYNECOLOGY

## 2022-08-22 PROCEDURE — 86376 MICROSOMAL ANTIBODY EACH: CPT

## 2022-08-22 PROCEDURE — 99213 OFFICE O/P EST LOW 20 MIN: CPT | Performed by: OBSTETRICS & GYNECOLOGY

## 2022-08-22 PROCEDURE — G8427 DOCREV CUR MEDS BY ELIG CLIN: HCPCS | Performed by: STUDENT IN AN ORGANIZED HEALTH CARE EDUCATION/TRAINING PROGRAM

## 2022-08-22 PROCEDURE — 99214 OFFICE O/P EST MOD 30 MIN: CPT | Performed by: OBSTETRICS & GYNECOLOGY

## 2022-08-22 PROCEDURE — 36415 COLL VENOUS BLD VENIPUNCTURE: CPT

## 2022-08-22 PROCEDURE — G8427 DOCREV CUR MEDS BY ELIG CLIN: HCPCS | Performed by: OBSTETRICS & GYNECOLOGY

## 2022-08-22 PROCEDURE — 84156 ASSAY OF PROTEIN URINE: CPT

## 2022-08-22 PROCEDURE — 76815 OB US LIMITED FETUS(S): CPT | Performed by: OBSTETRICS & GYNECOLOGY

## 2022-08-22 PROCEDURE — 84443 ASSAY THYROID STIM HORMONE: CPT

## 2022-08-22 PROCEDURE — 81001 URINALYSIS AUTO W/SCOPE: CPT

## 2022-08-22 PROCEDURE — 1036F TOBACCO NON-USER: CPT | Performed by: STUDENT IN AN ORGANIZED HEALTH CARE EDUCATION/TRAINING PROGRAM

## 2022-08-22 PROCEDURE — 76816 OB US FOLLOW-UP PER FETUS: CPT | Performed by: OBSTETRICS & GYNECOLOGY

## 2022-08-22 PROCEDURE — G8419 CALC BMI OUT NRM PARAM NOF/U: HCPCS | Performed by: STUDENT IN AN ORGANIZED HEALTH CARE EDUCATION/TRAINING PROGRAM

## 2022-08-22 PROCEDURE — 76818 FETAL BIOPHYS PROFILE W/NST: CPT | Performed by: OBSTETRICS & GYNECOLOGY

## 2022-08-22 PROCEDURE — 83615 LACTATE (LD) (LDH) ENZYME: CPT

## 2022-08-22 PROCEDURE — 84481 FREE ASSAY (FT-3): CPT

## 2022-08-22 PROCEDURE — 82570 ASSAY OF URINE CREATININE: CPT

## 2022-08-22 NOTE — PROGRESS NOTES
Pt here for BPP/NST  Pt denies any bleeding/cramping  Pt states good fetal movement  Pt  scheduled for 2022

## 2022-08-22 NOTE — PATIENT INSTRUCTIONS
Please arrive for your scheduled appointment at least 15 minutes early with your actual insurance card+ a photo ID. Also if you need any refills ordered or have questions, it may take up 48 hours to reply. Please allow ample time for your refills. Call me when you use last refill. Thank you for your cooperation. You might be having an NST at your next appt. Please eat a large snack or breakfast before coming to office. Thank you Call your primary obstetrician with bleeding, leaking of fluid, abdominal tenderness, headache, blurry vision, epigastric pain and increased urinary frequency. Do kick counts after dinner. Call your primary obstetrician if less than 10 kicks in 2 hours after dinner. Call your primary obstetrician with bleeding, leaking of fluid, abdominal tenderness, headache, blurry vision, epigastric pain and increased urinary frequency. if you are sick, not feeling well or have an infectious process going on please reschedule your appointment by calling [phone]. Also if any family members are not feeling well, please do not bring them to your appointment. We appreciate your cooperation. We are doing this in order to protect our pregnant mothers+ their babies. if you are sick, not feeling well or have an infectious process going on please reschedule your appointment by calling [phone]. Also if any family members are not feeling well, please do not bring them to your appointment. We appreciate your cooperation. We are doing this in order to protect our pregnant mothers+ their babies.

## 2022-08-22 NOTE — PROGRESS NOTES
Patient alert and pleasant with no complaints. Here today for prenatal visit. Fetal heart tones obtained without difficulty. Urine for glucose and protein obtained with negative results. GCC and GBS vaginal obtained, labeled and sent to lab  Discharge instructions have been discussed with the patient. Patient advised to call our office with any questions or concerns. Voiced understanding.

## 2022-08-22 NOTE — LETTER
today. A report is enclosed for your review. Assessment & Plan:  46 y.o.  at 36w3d ([address] 149) with:    1. Pregnancy dating -- The patient's pregnancy dating was previously reviewed. The patient reported that she had her Mirena removed on 2021. She was tracking her periods. Per the vickie she was using, she was having 25-day cycles. She reported a last menstrual period of 2021. However, she had 2 periods in December. Her first was on 2021. This would have been a 16-day cycle. The patient's initial ultrasound was reviewed. Based on her last menstrual period, she was 13 weeks 2 days, SHARA 2022. Based on the ultrasound, she was 14 weeks 3 days, SHARA 2022. There was an 8-day difference between dating by her LMP and the patient's initial ultrasound. Given there was greater than a 7-day discrepancy, the recommendation was made to use the 14-week ultrasound for dating. Thus, her SHARA is 2022 based on a 14-week ultrasound. The patient returned for an anatomy ultrasound. She was 19 weeks 3 days by her 14-week ultrasound. The composite gestational age was 22 weeks 5 days. Fetal growth was appropriate for the gestational age based on her 14-week ultrasound. The patient returns at 22 weeks 4 days. The composite gestational age was 25 weeks gestation and again agreed with dating by her 14-week ultrasound. The patient returned at 25 weeks 5 days. Fetal growth was again appropriate for the gestational age based on dating by her 14-week ultrasound. The patient returned at 28 weeks 4 days. Fetal growth was again appropriate for the gestational age based on dating by her 14-week ultrasound. The patient returned at 31 weeks 4 days. Fetal growth was again appropriate for the gestational age. There was a slight lag in the head circumference.      Fetal growth was reevaluated at 35 weeks 3 days and noted to be appropriate for the gestational age.  The estimate of fetal weight was at the 30th percentile. 2.  Advanced maternal age  -- The patient was previously counseled regarding the implications of advanced maternal age in pregnancy, specifically that of aneuploidy. Counseling was reviewed. The patient did not have any additional questions or concerns. The patient previously completed screening with NIPT. Her results were previously reviewed. The fetal fraction was 9% and the results were low risk for aneuploidy. Per the report, the fetus is female. The genetic screening results were reviewed with the patient. The patient declined any additional diagnostic testing. Since the patient had early screening with NIPT, a maternal serum AFP is recommended between 15 and 22 weeks to screen for open neural tube defects. A maternal serum AFP was normal at 1.02 MOM. Again, recent studies have reported that there may be an increased risk for fetal loss in the setting of advanced maternal age. Thus, increased surveillance is recommended. I recommend monitoring fetal growth serially, every 4 weeks beginning at 24-26 weeks' gestation. She should monitor fetal kick counts daily starting at 28 weeks' gestation. At 36 weeks' gestation, she should be scheduled for a weekly non stress test or biophysical profile. I recommend delivery at 44 week's gestation. Given there is an increased risk for hypertensive disorders of pregnancy in the setting of advanced maternal age, the possible utility of a low dose aspirin in reducing her risk for hypertensive disorders of pregnancy was reviewed. The risks and benefits of low dose aspirin were discussed. She was counseled to continue taking a daily low-dose aspirin for the remainder of pregnancy and 6 to 8 weeks postpartum. 3. History of  X3 -- The patient's first pregnancy was delivered via  secondary to a breech presentation. She then had a 2 repeat C-sections.   The placenta is anterior and above the lower uterine segment. The lower uterine segment previously appeared thin on ultrasound. The appearance will be monitored on follow-up ultrasound. She plans to have a repeat  for delivery. She is currently scheduled for a repeat  on 2022 at 39 weeks 3 days. Given her history of 3 prior C-sections, I recommended scheduling delivery at 38 weeks gestation. Patients with multiple prior low transverse  sections are at increased risk for uterine scar separation and/or dehiscence compared to individuals with 1 prior . The appearance of the lower uterine segment can be monitored, however, a thin lower uterine segment is not highly predictive of uterine rupture. Some experts recommend an early term delivery (37-38 weeks) in women with greater than or equal to 3 prior C-sections in order to reduce the risk for maternal and fetal complications. There is currently no consensus or best practice regarding this issue. Given the increased risk for maternal and fetal morbidity and mortality related to uterine scar separation or dehiscence, I typically recommend delivery at 40 to 38 weeks gestation in women with greater than or equal to 3 prior C-sections. Delivery may be indicated sooner if there are concerns for a thin lower uterine segment or scar separation during a prior . For planned delivery at 37 weeks gestation, I recommend a course of betamethasone 2 to 7 days prior to a scheduled delivery, if there are no contraindications. I would defer delivery timing to the referring provider. 4. History of anemia -- The patient reported that her prior pregnancies were complicated by anemia. Secondary to this history, a baseline nutrition panel and thyroid function studies were recommended.        Testing was completed on 3/30/2022, her results included: H/H12.1/35.6, MCV 87, platelet count 816,115, potassium 3.8, creatinine 0.7, calcium 9.2, ALT 20, AST 18, ferritin 28, folate >20, vitamin B12 591, vitamin D 31, magnesium 1.9, TSH 2.9, free T4 1.19, free T3 3, TPO negative, antithyroglobulin antibody negative, hemoglobin A1c 4.8%, urinalysis negative, urine culture negative, urine protein creatinine ratio 0.1.  --Additional recommendations are below. 5.  History of macrosomia -- The patient's third child weighed 9 pounds 1 ounce at 39 weeks gestation. She had a repeat  for delivery. She denies having a history of gestational diabetes. Because of this history, she is at risk for having another child with macrosomia. Fetal growth should be monitored serially, every 3 to 4 weeks starting at 24 to 26 weeks gestation. Fetal growth was appropriate for the gestational age at 34 weeks 4 days. Fetal growth was reevaluated at 35 weeks 3 days and again appropriate for the gestational age. The estimate of fetal weight was at the 30th percentile. Fetal growth will be reevaluated in 1 week. A baseline hemoglobin A1c was checked on 3/30/2022. It was normal at 4.8%. The patient had a baseline Glucola on 3/8/2022 that was normal at 128. She should have repeat screening at 24 to 28 weeks gestation. 6.  Work exposures -- The patient previously indicated that she works as an [employer]. She was counseled that she may be at increased risk for viral exposures such as CMV and parvovirus. She was given orders to have baseline screening for these viruses. Baseline screening was completed on 3/30/2022. Her results included: CMV IgG positive/IgM negative, parvovirus IgG positive/IgM negative. The results indicate past exposure to CMV and immunity to parvovirus. The results were previously reviewed with the patient. Precautions were reviewed. 7.  Family history of bleeding disorder -- The patient reported that her nephew (sister's son) has some type of bleeding disorder.   She was unsure of the name of the condition. She was encouraged to obtain additional information regarding her nephew's condition. The patient previously reported that her nephew has an issue with factor V. She was unsure if he has factor V Leiden or factor V deficiency. She states that the condition was inherited from his father. The patient reports that her sister was tested and does not have this condition. Subsequently, the patient reported that her nephew has a the factor V Leiden gene mutation. He actually has a clotting disorder. Again, he inherited this condition from his father. The patient's sister does not have the factor V Leiden mutation. The patient was previously counseled that if her sister does not have this condition or gene, that I would not anticipate any increased risk for the patient or her child. 8.  Family history of thyroid disease -- The patient's family history was previously reviewed and notable for thyroid disease in her mother. Given this family history, baseline thyroid function studies and a screening thyroid peroxidase antibody/antithyroglobulin antibody were recommended. The patient completed baseline screening on 3/30/2022, her results included: TSH 2.9, free T4 1.19, free T3 3, TPO negative, antithyroglobulin antibody negative. The results were previously reviewed with the patient. 9.  History of ovarian cyst -- The patient reported a history of an ovarian cyst.  She was unsure which side the cyst was on. She was supposed to have follow-up with her primary OB/GYN but did not have the follow-up imaging. The ovaries were evaluated previously and appeared normal bilaterally. No cystic structures were seen in the adnexa. The reassuring findings were reviewed with the patient. 10.  Vaccination in pregnancy -- The patient was  previously counseled regarding the recommendations for vaccination in pregnancy. Counseling was reviewed.   The patient did not have any additional questions or concerns. The patient was counseled regarding the recommendations for the Tdap vaccination in pregnancy. Risks and benefits were discussed. The vaccination is typically administered between 27 and 36 weeks gestation and recommended to confer protection against whooping cough to the . The patient plans to discuss this with her primary provider at her next visit. The patient was also counseled that her partner in any individuals who will be in close contact with the  should also be vaccinated for whooping cough. The patient was counseled regarding recommendation for the flu vaccination in pregnancy for both maternal and infant safety. Risks and benefits were reviewed. She was encouraged to have this vaccination as an outpatient. --She received a flu vaccination     Counseling was provided regarding the recommendation for the Covid vaccination in pregnancy. I advised the patient that the [employer] of Obstetrics and Gynecology and The Society for Maternal Fetal Medicine recommend that all pregnant women be vaccinated for COVID-19. \"Data have shown that COVID-19 infection puts pregnant people at increased risk of severe complications and even death; yet only about 22% of pregnant individuals have received 1 more doses of COVID-19 vaccine according to the [employer] for Disease Control and Prevention. \"     \" Recent data have shown that more than 95% of those were hospitalized and/or dying from COVID-19 are those who have remained unvaccinated. Pregnant individuals who have decided to wait until after delivery to be vaccinated may be inadvertently exposing themselves to an increased risk of severe illness or death. \"     \" COVID-19 vaccination is the best testing to reduce maternal and fetal complications of IGMIW-77 infection among pregnant people,\" said Jessika Alcantar MD, president of the Society for Maternal Fetal Medicine, sub-specialists.      The patient was counseled that in the event she opts not to have the Covid vaccination, she should alert her provider immediately if she test positive for Covid. Pregnant women are on the priority list for treatment with monoclonal antibody. This intervention has been shown to decrease the risk for hospitalization and complications related to Covid in pregnancy. --She received a Covid vaccination     The patient expressed verbal understanding of this counseling. 11.   Nausea and vomiting of pregnancy, improved -- The patients previously again reports that her nausea and vomiting symptoms are significantly improved. She has gained an additional 1 pound since her last visit. She still has occasional symptoms of nausea. She denies any signs of symptoms of dehydration. Precautions were reviewed. The patient was again encouraged to eat small amounts of food every 2-3 hours during the day. He was also encouraged to stay well-hydrated. A nutrition panel (CBC, CMP, magnesium, ferritin, folate, vitamin B12, vitamin D 25 [place]) was recommended. Baseline testing was completed on 3/30/2022, the results are summarized above. Repeat testing was completed on 5/17/2022, her results included: H/H 11.6/36.5, MCV 93.1, platelet count 249,124, potassium 4.3, creatinine 0.7, calcium 9.4, ALT 15, AST 16, magnesium 1.9, ferritin 21, folate 18.7, vitamin B12 464, vitamin D 30, TSH 2.97, free T4 1.04, free T3 2.5, TPO negative, uric acid 3.5, , urine protein creatinine ratio 0.1 urine culture negative. --Additional recommendations are below        12. Constipation, improving -- The patient previously had complaints of constipation. She again reports that her symptoms are significantly better. She was again counseled to stay well hydrated and increase fiber, fruit, and vegetable intake. Increased fiber intake was encouraged. She can use colace daily. A prescription was previously provided.  She can use a gentle laxative as needed. She can also use a probiotic as needed. Precautions were reviewed. 13.  Dolichocephaly, resolved-- The ultrasound from 31 weeks 6 days was reviewed with the patient. The cephalic index was again normal at 81%. Fetal growth was reevaluated at 35 weeks 3 days. The cephalic index was again normal at 76%. Previously, at 22 weeks 4 days, there was dolichocephaly in that the cephalic index is again 32% (normal >75%). The patient was counseled that there is concern for dolichocephaly given the cephalic index is <98%. This generally indicates that at this time, the fetal head is longer than it is wide. The fetal head shape varies during gestation and can be affected by factors such as presentation (more common with breech presentation), the amniotic fluid level, and having a multiple gestation. Additionally, it can be a normal variant (if mild). If the condition persists, it may be suggestive of a genetic condition or syndrome and/or craniosynostosis. The fetal head measurements will be reassessed at her follow up ultrasound. 14.  Proteinuria -- The patient was previously noted to have trace protein on a urine dip. She was normotensive with a blood pressure of 109/73 and she denied any signs or symptoms of preeclampsia. She denied any signs or symptoms of a urinary tract infection. She was given orders to have a urine culture and urine P/C ratio. If any abnormalities are detected, she will be contacted with results and follow-up recommendations. Preeclampsia precautions were reviewed with the patient. The patient's preeclampsia panel was negative on 6/27/2022. The patient's urine culture was positive for E. coli. See below. The patient's urine dip was notable for trace protein today. Her blood pressure was normal at 109/78. The patient completed repeat blood work today. Her urine protein creatinine ratio was normal at 0.1 and urinalysis negative.   A urine culture is pending. 15.  Low vitamin D -- The patient's vitamin D was low at 30 on 5/17/2022  --Recommend vitamin D3 1000 IU daily  --Monitor levels serially  --Monitor nutrition panel q4-6 weeks (CBC, CMP, magnesium, ferritin, folate, vitamin B12, vitamin D25OH)     --Repeat testing completed on 6/27/2022, results included: H/H11.6/35.3, MCV 9.5, PT/INR 1.3/1.1, APTT 27.5, potassium 3.7, creatinine 0.7, calcium 9, ALT 11, AST 14, magnesium 2.1, ferritin 25, folate >20, vitamin B12 458, vitamin D 37, , uric acid 3.3, TSH 1.47, free T4 0.97, free T3 2.2, TPO negative, hemoglobin A1c 4.5%, urinalysis concerning for infection, urine protein creatinine ratio 0.2, urine culture positive for E. Coli. --The patient's vitamin D improved. She was counseled to continue her vitamin D supplement. -- Repeat testing completed 8/22/2022, her results included: H/H 11.6/34.2, MCV 87.7, platelet count 667,028, potassium 4, creatinine 0.6, calcium 9.4, ALT 11, AST 17, magnesium 1.8, uric acid 4.4, , Ferritin 32, folate 13.2, vitamin B12 339, vitamin D 34, TSH 2.55, free T4 0.95, free T3 2.5, TPO negative, hemoglobin A1c 5.1%, urinalysis negative, urine protein creatinine ratio 0.1, urine culture negative. -- The patient's vitamin D was stable at 29. She was counseled to continue her vitamin D supplement for the remainder of pregnancy and at least 6 to 8 weeks postpartum. --A repeat nutrition panel is recommended at her 6-week postpartum visit  --Follow up with PCP for long term monitoring and management     16. Low ferritin -- The patient's ferritin was low at 21 (considered low if <15 in absence of anemia or <40 in setting of anemia)  --Ferrous sulfate 325 mg BID prescribed  --Monitor levels serially  --Monitor nutrition panel q4-6 weeks (CBC, CMP, magnesium, ferritin, folate, vitamin B12, vitamin D25OH)     --Repeat testing completed 6/27/2022, the patient's ferritin level increased to 25.   Her H/H was stable at 11.6/35.3.  --She was counseled to continue her iron supplement     --Repeat testing completed 8/22/2022, ferritin 32. --Patient was counseled to continue her iron supplement for the remainder of pregnancy and at least 6 to 8 weeks postpartum  --A repeat nutrition panel is recommended at her 6-week postpartum visit  --Follow up with PCP for long term monitoring and management     17. Elevated Glucola/normal 2-hour OGTT -- The patient's labs were reviewed. Her Glucola was elevated at 145 on 6/27/2022. A 2-hour oral glucose tolerance test was ordered for the patient. She completed testing on 7/13/2022 and her results were normal.  Her test results included: 85/171/121. 18.  Vaginal irritation/yeast infection, resolved -- The patient had complaints of vaginal itching and burning on 6/28/2022. She reported that her symptoms started on Sunday, June 26. She denied having any significant changes in her discharge. A genital culture was done at her visit. It was positive for yeast.  She was previously contacted and prescribed Monistat 7. The patient reported that her symptoms have improved. She denies having any recurrent symptoms of infection. 19.  Bacteriuria, resolved -- The patient's urine culture from 6/27/2022 was positive for E. coli. The patient was symptomatic with dysuria during her visit on 6/28/2022. She was treated with Macrobid. The culture was sensitive to Macrobid. The patient reports that her symptoms have resolved. --Recommend repeat urine culture with next of labs  --Repeat urine culture negative 8/22/2022     20. Lagging HC -- The ultrasound from 31 weeks 6 days was reviewed with the patient. The head circumference was at the 6th percentile. The intracranial anatomy otherwise appeared normal.  The transverse versus cerebellar diameter was at the 83rd percentile.   The patient was counseled that this is likely constitutional.  The head size will be monitored serially on follow-up ultrasound. The patient reports that her oldest daughter also had a small head circumference. Fetal growth was reevaluated at 35 weeks 3 days. The head circumference was normal at the 20th percentile. --The patient is scheduled for twice-weekly fetal testing. --The patient was counseled that she can have testing with her referring provider when scheduled for routine visits. Those weeks, she should see MFM once and her referring provider once for twice-weekly testing. --I requested the patient return for a follow-up assessment in 3 days unless there is a clinical reason for her to return prior to that time. She is to call if she has any problems or questions prior to her next visit. Further evaluation and management will be dependent on her clinical presentation and the results of her testing. --The patient was advised to call if she has any increased vaginal discharge, vaginal bleeding, contractions, abdominal pain, back pain or any new significant symptomatology prior to her next visit. I advised her that these are signs and symptoms of cervical change and require follow-up assessment when they occur. Preeclampsia precautions were also reviewed with the patient. --The patient was also counseled to call and/or return with any concerns for decreased fetal activity. --The patient is to continue to follow with you in your office for ongoing obstetric care. --The total time spent on today's visit was 30 minutes. This included preparation for the visit (i.e. reviewing prior external notes and test results), performance of a medically appropriate history and examination, counseling, orders for medications, tests or other procedures, and coordination of care. Greater than 50% of the time was spent face-to-face with the patient. This time is exclusive of procedures performed. I answered all of  the patient's questions to her satisfaction.  I asked her to call if she had any additional questions prior to her next visit. --At the conclusion of the visit, the patient appeared to have a good understanding of the issues discussed. I answered all of her questions to her satisfaction. I asked her to call if she had any additional questions prior to her next visit. --Thank you for allowing me to participate in the care of this pleasant patient. Please don't hesitate to call me if you have any questions. Sincerely,      Andressa Bernabe MD, [facility]  [phone]      *All or parts of this note may have been generated using a voice recognition program. There may be typo, grammar, or Word substitution errors that have escaped my review of this note.

## 2022-08-22 NOTE — PROGRESS NOTES
Doing well.   No complaints  Getting NST at 11 at [address] [facility] [address] [id number] is moving well, denies cxs, vaginal bleeding or LOF  Baby is moving at least 10 x/horu  GBS and GC/C collected today  SVE: 2/48/-8  Cephalic  +FHTs 509R  FH: 37 cm  C/S already scheduled for 39 weeks  Discussed importance of getting COVID booster  F/u in 1 week

## 2022-08-22 NOTE — PROGRESS NOTES
2022      [facility], [address],  [address]     RE:  Kathrine Tse  : 1980   AGE: 46 y.o. This report has been created using voice recognition software. It may contain errors which are inherent in voice recognition technology. Dear Dr. Guillermo Milan:      I had the pleasure of meeting with Ms. Mcfarlane for a return consultation. As you know, Ms. Brenda Phillips  is a 46 y.o.  at 36w3d ([address]) who is being followed by our office for multiple medical issues. Today, Ms. Mcfarlane reports that she feels well. She notes good fetal movement and denies any symptoms of leaking of fluid, vaginal bleeding, and/or contractions. She had a fetal ultrasound that was notable for the following. There is a single intrauterine gestation in a cephalic presentation with a heart rate of 146 beats per minute. The placenta is anterior. The amniotic fluid index is 13.5 cm. BPP 10/10. Umbilical artery PI normal.  MCA PSV normal.  CPR PI normal.      PERTINENT PHYSICAL EXAMINATION:   /78   Pulse 79   Wt 177 lb 6 oz (80.5 kg)   LMP 2021   BMI 32.44 kg/m²     Urine dipstick:   Negative for Glucose    Trace for Albumin      A fetal ultrasound assessment was performed today. A report is enclosed for your review. Assessment & Plan:  46 y.o.  at 36w3d ([facility]) with:    1. Pregnancy dating -- The patient's pregnancy dating was previously reviewed. The patient reported that she had her Mirena removed on 2021. She was tracking her periods. Per the vickie she was using, she was having 25-day cycles. She reported a last menstrual period of 2021. However, she had 2 periods in December. Her first was on 2021. This would have been a 16-day cycle. The patient's initial ultrasound was reviewed. Based on her last menstrual period, she was 13 weeks 2 days, SHARA 2022.   Based on the ultrasound, she was 14 weeks 3 days, SHARA 9/16/2022. There was an 8-day difference between dating by her LMP and the patient's initial ultrasound. Given there was greater than a 7-day discrepancy, the recommendation was made to use the 14-week ultrasound for dating. Thus, her SHARA is 9/16/2022 based on a 14-week ultrasound. The patient returned for an anatomy ultrasound. She was 19 weeks 3 days by her 14-week ultrasound. The composite gestational age was 22 weeks 5 days. Fetal growth was appropriate for the gestational age based on her 14-week ultrasound. The patient returns at 22 weeks 4 days. The composite gestational age was 25 weeks gestation and again agreed with dating by her 14-week ultrasound. The patient returned at 25 weeks 5 days. Fetal growth was again appropriate for the gestational age based on dating by her 14-week ultrasound. The patient returned at 28 weeks 4 days. Fetal growth was again appropriate for the gestational age based on dating by her 14-week ultrasound. The patient returned at 31 weeks 4 days. Fetal growth was again appropriate for the gestational age. There was a slight lag in the head circumference. Fetal growth was reevaluated at 35 weeks 3 days and noted to be appropriate for the gestational age. The estimate of fetal weight was at the 30th percentile. 2.  Advanced maternal age  -- The patient was previously counseled regarding the implications of advanced maternal age in pregnancy, specifically that of aneuploidy. Counseling was reviewed. The patient did not have any additional questions or concerns. The patient previously completed screening with NIPT. Her results were previously reviewed. The fetal fraction was 9% and the results were low risk for aneuploidy. Per the report, the fetus is female. The genetic screening results were reviewed with the patient. The patient declined any additional diagnostic testing.      Since the patient had early screening with NIPT, a maternal serum AFP is recommended between 15 and 22 weeks to screen for open neural tube defects. A maternal serum AFP was normal at 1.02 MOM. Again, recent studies have reported that there may be an increased risk for fetal loss in the setting of advanced maternal age. Thus, increased surveillance is recommended. I recommend monitoring fetal growth serially, every 4 weeks beginning at 24-26 weeks' gestation. She should monitor fetal kick counts daily starting at 28 weeks' gestation. At 36 weeks' gestation, she should be scheduled for a weekly non stress test or biophysical profile. I recommend delivery at 44 week's gestation. Given there is an increased risk for hypertensive disorders of pregnancy in the setting of advanced maternal age, the possible utility of a low dose aspirin in reducing her risk for hypertensive disorders of pregnancy was reviewed. The risks and benefits of low dose aspirin were discussed. She was counseled to continue taking a daily low-dose aspirin for the remainder of pregnancy and 6 to 8 weeks postpartum. 3. History of  X3 -- The patient's first pregnancy was delivered via  secondary to a breech presentation. She then had a 2 repeat C-sections. The placenta is anterior and above the lower uterine segment. The lower uterine segment previously appeared thin on ultrasound. The appearance will be monitored on follow-up ultrasound. She plans to have a repeat  for delivery. She is currently scheduled for a repeat  on 2022 at 39 weeks 3 days. Given her history of 3 prior C-sections, I recommended scheduling delivery at 38 weeks gestation. Patients with multiple prior low transverse  sections are at increased risk for uterine scar separation and/or dehiscence compared to individuals with 1 prior .   The appearance of the lower uterine segment can be monitored, however, a thin lower uterine segment is not highly predictive of uterine rupture. Some experts recommend an early term delivery (37-38 weeks) in women with greater than or equal to 3 prior C-sections in order to reduce the risk for maternal and fetal complications. There is currently no consensus or best practice regarding this issue. Given the increased risk for maternal and fetal morbidity and mortality related to uterine scar separation or dehiscence, I typically recommend delivery at 40 to 38 weeks gestation in women with greater than or equal to 3 prior C-sections. Delivery may be indicated sooner if there are concerns for a thin lower uterine segment or scar separation during a prior . For planned delivery at 37 weeks gestation, I recommend a course of betamethasone 2 to 7 days prior to a scheduled delivery, if there are no contraindications. I would defer delivery timing to the referring provider. 4. History of anemia -- The patient reported that her prior pregnancies were complicated by anemia. Secondary to this history, a baseline nutrition panel and thyroid function studies were recommended. Testing was completed on 3/30/2022, her results included: H/H12.1/35.6, MCV 87, platelet count 928,023, potassium 3.8, creatinine 0.7, calcium 9.2, ALT 20, AST 18, ferritin 28, folate >20, vitamin B12 591, vitamin D 31, magnesium 1.9, TSH 2.9, free T4 1.19, free T3 3, TPO negative, antithyroglobulin antibody negative, hemoglobin A1c 4.8%, urinalysis negative, urine culture negative, urine protein creatinine ratio 0.1.  --Additional recommendations are below. 5.  History of macrosomia -- The patient's third child weighed 9 pounds 1 ounce at 39 weeks gestation. She had a repeat  for delivery. She denies having a history of gestational diabetes. Because of this history, she is at risk for having another child with macrosomia.         Fetal growth should be monitored serially, every 3 to 4 weeks starting at 24 to 26 weeks gestation. Fetal growth was appropriate for the gestational age at 34 weeks 4 days. Fetal growth was reevaluated at 35 weeks 3 days and again appropriate for the gestational age. The estimate of fetal weight was at the 30th percentile. Fetal growth will be reevaluated in 1 week. A baseline hemoglobin A1c was checked on 3/30/2022. It was normal at 4.8%. The patient had a baseline Glucola on 3/8/2022 that was normal at 128. She should have repeat screening at 24 to 28 weeks gestation. 6.  Work exposures -- The patient previously indicated that she works as an [employer]. She was counseled that she may be at increased risk for viral exposures such as CMV and parvovirus. She was given orders to have baseline screening for these viruses. Baseline screening was completed on 3/30/2022. Her results included: CMV IgG positive/IgM negative, parvovirus IgG positive/IgM negative. The results indicate past exposure to CMV and immunity to parvovirus. The results were previously reviewed with the patient. Precautions were reviewed. 7.  Family history of bleeding disorder -- The patient reported that her nephew (sister's son) has some type of bleeding disorder. She was unsure of the name of the condition. She was encouraged to obtain additional information regarding her nephew's condition. The patient previously reported that her nephew has an issue with factor V. She was unsure if he has factor V Leiden or factor V deficiency. She states that the condition was inherited from his father. The patient reports that her sister was tested and does not have this condition. Subsequently, the patient reported that her nephew has a the factor V Leiden gene mutation. He actually has a clotting disorder. Again, he inherited this condition from his father. The patient's sister does not have the factor V Leiden mutation.      The patient was previously counseled that if her sister does not have this condition or gene, that I would not anticipate any increased risk for the patient or her child. 8.  Family history of thyroid disease -- The patient's family history was previously reviewed and notable for thyroid disease in her mother. Given this family history, baseline thyroid function studies and a screening thyroid peroxidase antibody/antithyroglobulin antibody were recommended. The patient completed baseline screening on 3/30/2022, her results included: TSH 2.9, free T4 1.19, free T3 3, TPO negative, antithyroglobulin antibody negative. The results were previously reviewed with the patient. 9.  History of ovarian cyst -- The patient reported a history of an ovarian cyst.  She was unsure which side the cyst was on. She was supposed to have follow-up with her primary OB/GYN but did not have the follow-up imaging. The ovaries were evaluated previously and appeared normal bilaterally. No cystic structures were seen in the adnexa. The reassuring findings were reviewed with the patient. 10.  Vaccination in pregnancy -- The patient was  previously counseled regarding the recommendations for vaccination in pregnancy. Counseling was reviewed. The patient did not have any additional questions or concerns. The patient was counseled regarding the recommendations for the Tdap vaccination in pregnancy. Risks and benefits were discussed. The vaccination is typically administered between 27 and 36 weeks gestation and recommended to confer protection against whooping cough to the . The patient plans to discuss this with her primary provider at her next visit. The patient was also counseled that her partner in any individuals who will be in close contact with the  should also be vaccinated for whooping cough. The patient was counseled regarding recommendation for the flu vaccination in pregnancy for both maternal and infant safety.   Risks and benefits were reviewed. She was encouraged to have this vaccination as an outpatient. --She received a flu vaccination     Counseling was provided regarding the recommendation for the Covid vaccination in pregnancy. I advised the patient that the [employer] of Obstetrics and Gynecology and The Society for Maternal Fetal Medicine recommend that all pregnant women be vaccinated for COVID-19. \"Data have shown that COVID-19 infection puts pregnant people at increased risk of severe complications and even death; yet only about 22% of pregnant individuals have received 1 more doses of COVID-19 vaccine according to the [employer] for Disease Control and Prevention. \"     \" Recent data have shown that more than 95% of those were hospitalized and/or dying from COVID-19 are those who have remained unvaccinated. Pregnant individuals who have decided to wait until after delivery to be vaccinated may be inadvertently exposing themselves to an increased risk of severe illness or death. \"     \" COVID-19 vaccination is the best testing to reduce maternal and fetal complications of [id number]-[id number] infection among pregnant people,\" said Manisha Ledesma MD, president of the Society for Maternal Fetal Medicine, sub-specialists. The patient was counseled that in the event she opts not to have the Covid vaccination, she should alert her provider immediately if she test positive for Covid. Pregnant women are on the priority list for treatment with monoclonal antibody. This intervention has been shown to decrease the risk for hospitalization and complications related to Covid in pregnancy. --She received a Covid vaccination     The patient expressed verbal understanding of this counseling. 11.   Nausea and vomiting of pregnancy, improved -- The patients previously again reports that her nausea and vomiting symptoms are significantly improved. She has gained an additional 1 pound since her last visit.   She still has occasional symptoms of nausea. She denies any signs of symptoms of dehydration. Precautions were reviewed. The patient was again encouraged to eat small amounts of food every 2-3 hours during the day. He was also encouraged to stay well-hydrated. A nutrition panel (CBC, CMP, magnesium, ferritin, folate, vitamin B12, vitamin D 25 [place]) was recommended. Baseline testing was completed on 3/30/2022, the results are summarized above. Repeat testing was completed on 5/17/2022, her results included: H/H 11.6/36.5, MCV 93.1, platelet count 711,225, potassium 4.3, creatinine 0.7, calcium 9.4, ALT 15, AST 16, magnesium 1.9, ferritin 21, folate 18.7, vitamin B12 464, vitamin D 30, TSH 2.97, free T4 1.04, free T3 2.5, TPO negative, uric acid 3.5, , urine protein creatinine ratio 0.1 urine culture negative. --Additional recommendations are below        12. Constipation, improving -- The patient previously had complaints of constipation. She again reports that her symptoms are significantly better. She was again counseled to stay well hydrated and increase fiber, fruit, and vegetable intake. Increased fiber intake was encouraged. She can use colace daily. A prescription was previously provided. She can use a gentle laxative as needed. She can also use a probiotic as needed. Precautions were reviewed. 13.  Dolichocephaly, resolved-- The ultrasound from 31 weeks 6 days was reviewed with the patient. The cephalic index was again normal at 81%. Fetal growth was reevaluated at 35 weeks 3 days. The cephalic index was again normal at 76%. Previously, at 22 weeks 4 days, there was dolichocephaly in that the cephalic index is again 09% (normal >75%). The patient was counseled that there is concern for dolichocephaly given the cephalic index is <59%. This generally indicates that at this time, the fetal head is longer than it is wide.   The fetal head shape varies during gestation and can be affected by factors such as presentation (more common with breech presentation), the amniotic fluid level, and having a multiple gestation. Additionally, it can be a normal variant (if mild). If the condition persists, it may be suggestive of a genetic condition or syndrome and/or craniosynostosis. The fetal head measurements will be reassessed at her follow up ultrasound. 14.  Proteinuria -- The patient was previously noted to have trace protein on a urine dip. She was normotensive with a blood pressure of 109/73 and she denied any signs or symptoms of preeclampsia. She denied any signs or symptoms of a urinary tract infection. She was given orders to have a urine culture and urine P/C ratio. If any abnormalities are detected, she will be contacted with results and follow-up recommendations. Preeclampsia precautions were reviewed with the patient. The patient's preeclampsia panel was negative on 6/27/2022. The patient's urine culture was positive for E. coli. See below. The patient's urine dip was notable for trace protein today. Her blood pressure was normal at 109/78. The patient completed repeat blood work today. Her urine protein creatinine ratio was normal at 0.1 and urinalysis negative. A urine culture is pending.          15.  Low vitamin D -- The patient's vitamin D was low at 30 on 5/17/2022  --Recommend vitamin D3 1000 IU daily  --Monitor levels serially  --Monitor nutrition panel q4-6 weeks (CBC, CMP, magnesium, ferritin, folate, vitamin B12, vitamin D25OH)     --Repeat testing completed on 6/27/2022, results included: H/H11.6/35.3, MCV 9.5, PT/INR 1.3/1.1, APTT 27.5, potassium 3.7, creatinine 0.7, calcium 9, ALT 11, AST 14, magnesium 2.1, ferritin 25, folate >20, vitamin B12 458, vitamin D 37, , uric acid 3.3, TSH 1.47, free T4 0.97, free T3 2.2, TPO negative, hemoglobin A1c 4.5%, urinalysis concerning for infection, urine protein creatinine ratio 0.2, urine culture positive for E. Coli.     --The patient's vitamin D improved. She was counseled to continue her vitamin D supplement. -- Repeat testing completed 8/22/2022, her results included: H/H 11.6/34.2, MCV 87.7, platelet count 403,062, potassium 4, creatinine 0.6, calcium 9.4, ALT 11, AST 17, magnesium 1.8, uric acid 4.4, , Ferritin 32, folate 13.2, vitamin B12 339, vitamin D 34, TSH 2.55, free T4 0.95, free T3 2.5, TPO negative, hemoglobin A1c 5.1%, urinalysis negative, urine protein creatinine ratio 0.1, urine culture negative. -- The patient's vitamin D was stable at 29. She was counseled to continue her vitamin D supplement for the remainder of pregnancy and at least 6 to 8 weeks postpartum. --A repeat nutrition panel is recommended at her 6-week postpartum visit  --Follow up with PCP for long term monitoring and management     16. Low ferritin -- The patient's ferritin was low at 21 (considered low if <15 in absence of anemia or <40 in setting of anemia)  --Ferrous sulfate 325 mg BID prescribed  --Monitor levels serially  --Monitor nutrition panel q4-6 weeks (CBC, CMP, magnesium, ferritin, folate, vitamin B12, vitamin D25OH)     --Repeat testing completed 6/27/2022, the patient's ferritin level increased to 25. Her H/H was stable at 11.6/35.3.  --She was counseled to continue her iron supplement     --Repeat testing completed 8/22/2022, ferritin 32. --Patient was counseled to continue her iron supplement for the remainder of pregnancy and at least 6 to 8 weeks postpartum  --A repeat nutrition panel is recommended at her 6-week postpartum visit  --Follow up with PCP for long term monitoring and management     17. Elevated Glucola/normal 2-hour OGTT -- The patient's labs were reviewed. Her Glucola was elevated at 145 on 6/27/2022. A 2-hour oral glucose tolerance test was ordered for the patient. She completed testing on 7/13/2022 and her results were normal.  Her test results included: 85/171/121.      18. Vaginal irritation/yeast infection, resolved -- The patient had complaints of vaginal itching and burning on 6/28/2022. She reported that her symptoms started on Sunday, June 26. She denied having any significant changes in her discharge. A genital culture was done at her visit. It was positive for yeast.  She was previously contacted and prescribed Monistat 7. The patient reported that her symptoms have improved. She denies having any recurrent symptoms of infection. 19.  Bacteriuria, resolved -- The patient's urine culture from 6/27/2022 was positive for E. coli. The patient was symptomatic with dysuria during her visit on 6/28/2022. She was treated with Macrobid. The culture was sensitive to Macrobid. The patient reports that her symptoms have resolved. --Recommend repeat urine culture with next of labs  --Repeat urine culture negative 8/22/2022     20. Lagging HC -- The ultrasound from 31 weeks 6 days was reviewed with the patient. The head circumference was at the 6th percentile. The intracranial anatomy otherwise appeared normal.  The transverse versus cerebellar diameter was at the 83rd percentile. The patient was counseled that this is likely constitutional.  The head size will be monitored serially on follow-up ultrasound. The patient reports that her oldest daughter also had a small head circumference. Fetal growth was reevaluated at 35 weeks 3 days. The head circumference was normal at the 20th percentile. --The patient is scheduled for twice-weekly fetal testing. --The patient was counseled that she can have testing with her referring provider when scheduled for routine visits. Those weeks, she should see [facility] once and her referring provider once for twice-weekly testing. --I requested the patient return for a follow-up assessment in 3 days unless there is a clinical reason for her to return prior to that time.  She is to call if she has any problems or questions prior to her next visit. Further evaluation and management will be dependent on her clinical presentation and the results of her testing. --The patient was advised to call if she has any increased vaginal discharge, vaginal bleeding, contractions, abdominal pain, back pain or any new significant symptomatology prior to her next visit. I advised her that these are signs and symptoms of cervical change and require follow-up assessment when they occur. Preeclampsia precautions were also reviewed with the patient. --The patient was also counseled to call and/or return with any concerns for decreased fetal activity. --The patient is to continue to follow with you in your office for ongoing obstetric care. --The total time spent on today's visit was 30 minutes. This included preparation for the visit (i.e. reviewing prior external notes and test results), performance of a medically appropriate history and examination, counseling, orders for medications, tests or other procedures, and coordination of care. Greater than 50% of the time was spent face-to-face with the patient. This time is exclusive of procedures performed. I answered all of  the patient's questions to her satisfaction. I asked her to call if she had any additional questions prior to her next visit. --At the conclusion of the visit, the patient appeared to have a good understanding of the issues discussed. I answered all of her questions to her satisfaction. I asked her to call if she had any additional questions prior to her next visit. --Thank you for allowing me to participate in the care of this pleasant patient. Please don't hesitate to call me if you have any questions.       Sincerely,      Cornelia William MD, [address]  [phone]      *All or parts of this note may have been generated using a voice recognition program. There may be typo, grammar, or Word substitution errors that have escaped my review of this note.

## 2022-08-24 LAB
THYROID PEROXIDASE (TPO) ABS: <4 IU/ML (ref 0–25)
URINE CULTURE, ROUTINE: NORMAL

## 2022-08-25 ENCOUNTER — ROUTINE PRENATAL (OUTPATIENT)
Dept: OBGYN CLINIC | Age: 42
End: 2022-08-25
Payer: COMMERCIAL

## 2022-08-25 VITALS
DIASTOLIC BLOOD PRESSURE: 73 MMHG | HEART RATE: 93 BPM | BODY MASS INDEX: 32.76 KG/M2 | SYSTOLIC BLOOD PRESSURE: 115 MMHG | WEIGHT: 179.13 LBS

## 2022-08-25 DIAGNOSIS — O09.523 ELDERLY MULTIGRAVIDA, THIRD TRIMESTER: Primary | ICD-10-CM

## 2022-08-25 DIAGNOSIS — Z3A.36 36 WEEKS GESTATION OF PREGNANCY: ICD-10-CM

## 2022-08-25 LAB
C TRACH DNA GENITAL QL NAA+PROBE: NEGATIVE
GLUCOSE URINE, POC: NORMAL
GROUP B STREP CULTURE: NORMAL
N. GONORRHOEAE DNA: NEGATIVE
PROTEIN UA: NEGATIVE
SOURCE: NORMAL

## 2022-08-25 PROCEDURE — 59025 FETAL NON-STRESS TEST: CPT | Performed by: OBSTETRICS & GYNECOLOGY

## 2022-08-25 PROCEDURE — 81002 URINALYSIS NONAUTO W/O SCOPE: CPT | Performed by: OBSTETRICS & GYNECOLOGY

## 2022-08-25 PROCEDURE — 99999 PR OFFICE/OUTPT VISIT,PROCEDURE ONLY: CPT | Performed by: OBSTETRICS & GYNECOLOGY

## 2022-08-25 PROCEDURE — 99213 OFFICE O/P EST LOW 20 MIN: CPT | Performed by: OBSTETRICS & GYNECOLOGY

## 2022-08-25 NOTE — PATIENT INSTRUCTIONS

## 2022-08-25 NOTE — PROGRESS NOTES
NON STRESS TEST INTERPRETATION    22    RE:  Chevy Harrington   : 1980   AGE: 46 y.o.     GESTATIONAL AGE:  36w6d    DIAGNOSIS:   Advanced maternal age, history of  x3    INDICATION:  Advanced maternal age, history of  x3    TIME ON:  1049      TIME OFF:  1139      RESULT:   REACTIVE      FHR Baseline Rate:   135 bpm    PERIODIC CHANGES:    Accelerations present, variability moderate, no decelerations noted    COMMENTS:      She is to continue having NST's every 3-4 days, and BPP with umbilical artery doppler studies once per week        Jayden Thomas MD, Serena Burnett

## 2022-08-29 ENCOUNTER — ROUTINE PRENATAL (OUTPATIENT)
Dept: OBGYN CLINIC | Age: 42
End: 2022-08-29
Payer: COMMERCIAL

## 2022-08-29 ENCOUNTER — ROUTINE PRENATAL (OUTPATIENT)
Dept: OBGYN | Age: 42
End: 2022-08-29

## 2022-08-29 ENCOUNTER — TELEPHONE (OUTPATIENT)
Dept: OBGYN | Age: 42
End: 2022-08-29

## 2022-08-29 ENCOUNTER — ANCILLARY PROCEDURE (OUTPATIENT)
Dept: OBGYN CLINIC | Age: 42
End: 2022-08-29
Payer: COMMERCIAL

## 2022-08-29 VITALS
BODY MASS INDEX: 33.11 KG/M2 | SYSTOLIC BLOOD PRESSURE: 112 MMHG | WEIGHT: 181 LBS | HEART RATE: 95 BPM | DIASTOLIC BLOOD PRESSURE: 71 MMHG

## 2022-08-29 VITALS — SYSTOLIC BLOOD PRESSURE: 103 MMHG | HEART RATE: 90 BPM | DIASTOLIC BLOOD PRESSURE: 58 MMHG

## 2022-08-29 DIAGNOSIS — Z3A.37 37 WEEKS GESTATION OF PREGNANCY: Primary | ICD-10-CM

## 2022-08-29 DIAGNOSIS — O09.291 HISTORY OF MACROSOMIA IN INFANT IN PRIOR PREGNANCY, CURRENTLY PREGNANT IN FIRST TRIMESTER: ICD-10-CM

## 2022-08-29 DIAGNOSIS — R79.89 LOW VITAMIN D LEVEL: ICD-10-CM

## 2022-08-29 DIAGNOSIS — Z98.891 HISTORY OF C-SECTION: ICD-10-CM

## 2022-08-29 DIAGNOSIS — R79.0 LOW FERRITIN: ICD-10-CM

## 2022-08-29 DIAGNOSIS — Z86.2 HISTORY OF ANEMIA: ICD-10-CM

## 2022-08-29 LAB
GLUCOSE URINE, POC: NEGATIVE
PROTEIN UA: NEGATIVE

## 2022-08-29 PROCEDURE — 76821 MIDDLE CEREBRAL ARTERY ECHO: CPT | Performed by: OBSTETRICS & GYNECOLOGY

## 2022-08-29 PROCEDURE — 99213 OFFICE O/P EST LOW 20 MIN: CPT | Performed by: OBSTETRICS & GYNECOLOGY

## 2022-08-29 PROCEDURE — G8419 CALC BMI OUT NRM PARAM NOF/U: HCPCS | Performed by: OBSTETRICS & GYNECOLOGY

## 2022-08-29 PROCEDURE — G8427 DOCREV CUR MEDS BY ELIG CLIN: HCPCS | Performed by: OBSTETRICS & GYNECOLOGY

## 2022-08-29 PROCEDURE — 76820 UMBILICAL ARTERY ECHO: CPT | Performed by: OBSTETRICS & GYNECOLOGY

## 2022-08-29 PROCEDURE — 81002 URINALYSIS NONAUTO W/O SCOPE: CPT | Performed by: OBSTETRICS & GYNECOLOGY

## 2022-08-29 PROCEDURE — 99214 OFFICE O/P EST MOD 30 MIN: CPT | Performed by: OBSTETRICS & GYNECOLOGY

## 2022-08-29 PROCEDURE — 76816 OB US FOLLOW-UP PER FETUS: CPT | Performed by: OBSTETRICS & GYNECOLOGY

## 2022-08-29 PROCEDURE — 76818 FETAL BIOPHYS PROFILE W/NST: CPT | Performed by: OBSTETRICS & GYNECOLOGY

## 2022-08-29 PROCEDURE — 1036F TOBACCO NON-USER: CPT | Performed by: OBSTETRICS & GYNECOLOGY

## 2022-08-29 NOTE — PROGRESS NOTES
Patient alert and pleasant with no complaints. Here today for prenatal visit and to discuss moving CS up to 38 weeks. FH and NST completed at [facility] today   Will call patient once the date and time for CS is scheduled   Discharge instructions have been discussed with the patient. Patient advised to call our office with any questions or concerns. Voiced understanding.

## 2022-08-29 NOTE — PROGRESS NOTES
2022      Lakeisha Veliz, [address]  [place],  [address]     RE:  Evelin Perez  : 1980   AGE: 46 y.o. This report has been created using voice recognition software. It may contain errors which are inherent in voice recognition technology. Dear Dr. Terrance Jiménez:      I had the pleasure of meeting with Ms. Mcfarlane for a return consultation. As you know, Ms. Vanessa Dooley  is a 46 y.o.  at 37w3d ([address]) who is being followed by our office for multiple medical issues. Today, Ms. Mcfarlane reports that she feels well. She notes good fetal movement and denies any symptoms of leaking of fluid, vaginal bleeding, and/or contractions. She had a fetal ultrasound that was notable for the following. There is a single intrauterine gestation in a cephalic presentation with a heart rate of 154 beats per minute. The placenta is anterior. The amniotic fluid index is 18.4 cm. The composite gestational age is 35w0d. The estimated fetal weight is at the 31st percentile. BPP 10/10. Umbilical artery PI normal.  MCA PSV normal.  CPR PI normal.      PERTINENT PHYSICAL EXAMINATION:   /71   Pulse 95   Wt 181 lb (82.1 kg)   LMP 2021   BMI 33.11 kg/m²     Urine dipstick:   Negative for Glucose    Negative for Albumin      A fetal ultrasound assessment was performed today. A report is enclosed for your review. Assessment & Plan:  46 y.o.  [facility] 37[facility]3d [facility] with:    1. Pregnancy dating -- The patient's pregnancy dating was previously reviewed. The patient reported that she had her Mirena removed on 2021. She was tracking her periods. Per the vickie she was using, she was having 25-day cycles. She reported a last menstrual period of 2021. However, she had 2 periods in December. Her first was on 2021. This would have been a 16-day cycle. The patient's initial ultrasound was reviewed.   Based on her last menstrual period, she was 13 weeks 2 days, SHARA 9/24/2022. Based on the ultrasound, she was 14 weeks 3 days, SHARA 9/16/2022. There was an 8-day difference between dating by her LMP and the patient's initial ultrasound. Given there was greater than a 7-day discrepancy, the recommendation was made to use the 14-week ultrasound for dating. Thus, her SHARA is 9/16/2022 based on a 14-week ultrasound. The patient returned for an anatomy ultrasound. She was 19 weeks 3 days by her 14-week ultrasound. The composite gestational age was 22 weeks 5 days. Fetal growth was appropriate for the gestational age based on her 14-week ultrasound. The patient returns at 22 weeks 4 days. The composite gestational age was 25 weeks gestation and again agreed with dating by her 14-week ultrasound. The patient returned at 25 weeks 5 days. Fetal growth was again appropriate for the gestational age based on dating by her 14-week ultrasound. The patient returned at 28 weeks 4 days. Fetal growth was again appropriate for the gestational age based on dating by her 14-week ultrasound. The patient returned at 31 weeks 4 days. Fetal growth was again appropriate for the gestational age. There was a slight lag in the head circumference. Fetal growth was reevaluated again today at 37 weeks 3 days and noted to be appropriate for the gestational age. The estimate of fetal weight was at the 31st percentile. 2.  Advanced maternal age  -- The patient was previously counseled regarding the implications of advanced maternal age in pregnancy, specifically that of aneuploidy. Counseling was reviewed. The patient did not have any additional questions or concerns. The patient previously completed screening with NIPT. Her results were previously reviewed. The fetal fraction was 9% and the results were low risk for aneuploidy. Per the report, the fetus is female. The genetic screening results were reviewed with the patient.   The patient declined any additional diagnostic testing. Since the patient had early screening with NIPT, a maternal serum AFP is recommended between 15 and 22 weeks to screen for open neural tube defects. A maternal serum AFP was normal at 1.02 MOM. Again, recent studies have reported that there may be an increased risk for fetal loss in the setting of advanced maternal age. Thus, increased surveillance is recommended. I recommend monitoring fetal growth serially, every 4 weeks beginning at 24-26 weeks' gestation. She should monitor fetal kick counts daily starting at 28 weeks' gestation. At 36 weeks' gestation, she should be scheduled for a weekly non stress test or biophysical profile. I recommend delivery at 44 week's gestation. Given there is an increased risk for hypertensive disorders of pregnancy in the setting of advanced maternal age, the possible utility of a low dose aspirin in reducing her risk for hypertensive disorders of pregnancy was reviewed. The risks and benefits of low dose aspirin were discussed. She was counseled to continue taking a daily low-dose aspirin for the remainder of pregnancy and 6 to 8 weeks postpartum. 3. History of  X3 -- The patient's first pregnancy was delivered via  secondary to a breech presentation. She then had a 2 repeat C-sections. The placenta is anterior and above the lower uterine segment. The lower uterine segment previously appeared thin on ultrasound. The appearance will be monitored on follow-up ultrasound. She plans to have a repeat  for delivery. She is currently scheduled for a repeat  on 2022 at 39 weeks 3 days. Given her history of 3 prior C-sections, I recommended scheduling delivery at 38 weeks gestation. The patient is scheduled for a repeat  on  at 38 weeks 5 days.      Again, patients with multiple prior low transverse  sections are at increased risk for uterine scar separation and/or dehiscence compared to individuals with 1 prior . The appearance of the lower uterine segment can be monitored, however, a thin lower uterine segment is not highly predictive of uterine rupture. Some experts recommend an early term delivery (37-38 weeks) in women with greater than or equal to 3 prior C-sections in order to reduce the risk for maternal and fetal complications. There is currently no consensus or best practice regarding this issue. Given the increased risk for maternal and fetal morbidity and mortality related to uterine scar separation or dehiscence, I typically recommend delivery at 40 to 38 weeks gestation in women with greater than or equal to 3 prior C-sections. Delivery may be indicated sooner if there are concerns for a thin lower uterine segment or scar separation during a prior . The risks and benefits of an early term delivery were reviewed with the patient. 4. History of anemia -- The patient reported that her prior pregnancies were complicated by anemia. Secondary to this history, a baseline nutrition panel and thyroid function studies were recommended. Testing was completed on 3/30/2022, her results included: H/H12.1/35.6, MCV 87, platelet count 054,535, potassium 3.8, creatinine 0.7, calcium 9.2, ALT 20, AST 18, ferritin 28, folate >20, vitamin B12 591, vitamin D 31, magnesium 1.9, TSH 2.9, free T4 1.19, free T3 3, TPO negative, antithyroglobulin antibody negative, hemoglobin A1c 4.8%, urinalysis negative, urine culture negative, urine protein creatinine ratio 0.1.  --Additional recommendations are below. 5.  History of macrosomia -- The patient's third child weighed 9 pounds 1 ounce at 39 weeks gestation. She had a repeat  for delivery. She denies having a history of gestational diabetes. Because of this history, she is at risk for having another child with macrosomia.         Fetal growth should be monitored serially, every 3 to 4 weeks starting at 24 to 26 weeks gestation. Fetal growth was appropriate for the gestational age at 34 weeks 4 days. Fetal growth was reevaluated at 37 weeks 3 days and again appropriate for the gestational age. The estimate of fetal weight was at the 31st percentile. A baseline hemoglobin A1c was checked on 3/30/2022. It was normal at 4.8%. The patient had a baseline Glucola on 3/8/2022 that was normal at 128. She should have repeat screening at 24 to 28 weeks gestation. 6.  Work exposures -- The patient previously indicated that she works as an [employer]. She was counseled that she may be at increased risk for viral exposures such as CMV and parvovirus. She was given orders to have baseline screening for these viruses. Baseline screening was completed on 3/30/2022. Her results included: CMV IgG positive/IgM negative, parvovirus IgG positive/IgM negative. The results indicate past exposure to CMV and immunity to parvovirus. The results were previously reviewed with the patient. Precautions were reviewed. 7.  Family history of bleeding disorder -- The patient reported that her nephew (sister's son) has some type of bleeding disorder. She was unsure of the name of the condition. She was encouraged to obtain additional information regarding her nephew's condition. The patient previously reported that her nephew has an issue with factor V. She was unsure if he has factor V Leiden or factor V deficiency. She states that the condition was inherited from his father. The patient reports that her sister was tested and does not have this condition. Subsequently, the patient reported that her nephew has a the factor V Leiden gene mutation. He actually has a clotting disorder. Again, he inherited this condition from his father. The patient's sister does not have the factor V Leiden mutation.      The patient was previously counseled that if her sister does not have this condition or gene, that I would not anticipate any increased risk for the patient or her child. 8.  Family history of thyroid disease -- The patient's family history was previously reviewed and notable for thyroid disease in her mother. Given this family history, baseline thyroid function studies and a screening thyroid peroxidase antibody/antithyroglobulin antibody were recommended. The patient completed baseline screening on 3/30/2022, her results included: TSH 2.9, free T4 1.19, free T3 3, TPO negative, antithyroglobulin antibody negative. The results were previously reviewed with the patient. 9.  History of ovarian cyst -- The patient reported a history of an ovarian cyst.  She was unsure which side the cyst was on. She was supposed to have follow-up with her primary OB/GYN but did not have the follow-up imaging. The ovaries were evaluated previously and appeared normal bilaterally. No cystic structures were seen in the adnexa. The reassuring findings were reviewed with the patient. 10.  Vaccination in pregnancy -- The patient was  previously counseled regarding the recommendations for vaccination in pregnancy. Counseling was reviewed. The patient did not have any additional questions or concerns. The patient was counseled regarding the recommendations for the Tdap vaccination in pregnancy. Risks and benefits were discussed. The vaccination is typically administered between 27 and 36 weeks gestation and recommended to confer protection against whooping cough to the . The patient plans to discuss this with her primary provider at her next visit. The patient was also counseled that her partner in any individuals who will be in close contact with the  should also be vaccinated for whooping cough. The patient was counseled regarding recommendation for the flu vaccination in pregnancy for both maternal and infant safety.   Risks and benefits were reviewed. She was encouraged to have this vaccination as an outpatient. --She received a flu vaccination     Counseling was provided regarding the recommendation for the Covid vaccination in pregnancy. I advised the patient that the [employer] of Obstetrics and Gynecology and The Society for Maternal Fetal Medicine recommend that all pregnant women be vaccinated for COVID-19. \"Data have shown that COVID-19 infection puts pregnant people at increased risk of severe complications and even death; yet only about 22% of pregnant individuals have received 1 more doses of COVID-19 vaccine according to the [employer] for Disease Control and Prevention. \"     \" Recent data have shown that more than 95% of those were hospitalized and/or dying from COVID-19 are those who have remained unvaccinated. Pregnant individuals who have decided to wait until after delivery to be vaccinated may be inadvertently exposing themselves to an increased risk of severe illness or death. \"     \" COVID-19 vaccination is the best testing to reduce maternal and fetal complications of ZINHP-41 infection among pregnant people,\" said José Miguel Laboy MD, president of the Society for Maternal Fetal Medicine, sub-specialists. The patient was counseled that in the event she opts not to have the Covid vaccination, she should alert her provider immediately if she test positive for Covid. Pregnant women are on the priority list for treatment with monoclonal antibody. This intervention has been shown to decrease the risk for hospitalization and complications related to Covid in pregnancy. --She received a Covid vaccination     The patient expressed verbal understanding of this counseling. 11.   Nausea and vomiting of pregnancy, improved -- The patients previously again reports that her nausea and vomiting symptoms are significantly improved. She has gained an additional 1 pound since her last visit.   She still has occasional symptoms of nausea. She denies any signs of symptoms of dehydration. Precautions were reviewed. The patient was again encouraged to eat small amounts of food every 2-3 hours during the day. He was also encouraged to stay well-hydrated. A nutrition panel (CBC, CMP, magnesium, ferritin, folate, vitamin B12, vitamin D 25 [place]) was recommended. Baseline testing was completed on 3/30/2022, the results are summarized above. Repeat testing was completed on 5/17/2022, her results included: H/H 11.6/36.5, MCV 93.1, platelet count 921,857, potassium 4.3, creatinine 0.7, calcium 9.4, ALT 15, AST 16, magnesium 1.9, ferritin 21, folate 18.7, vitamin B12 464, vitamin D 30, TSH 2.97, free T4 1.04, free T3 2.5, TPO negative, uric acid 3.5, , urine protein creatinine ratio 0.1 urine culture negative. --Additional recommendations are below        12. Constipation, improving -- The patient previously had complaints of constipation. She again reports that her symptoms are significantly better. She was again counseled to stay well hydrated and increase fiber, fruit, and vegetable intake. Increased fiber intake was encouraged. She can use colace daily. A prescription was previously provided. She can use a gentle laxative as needed. She can also use a probiotic as needed. Precautions were reviewed. 13.  Dolichocephaly, resolved-- The ultrasound from 31 weeks 6 days was reviewed with the patient. The cephalic index was again normal at 81%. Fetal growth was reevaluated at 35 weeks 3 days. The cephalic index was again normal at 76%. Fetal growth was reevaluated today at 37 weeks 3 days. The cephalic index was again normal at 76%. Previously, at 22 weeks 4 days, there was dolichocephaly in that the cephalic index is again 81% (normal >75%). The patient was counseled that there is concern for dolichocephaly given the cephalic index is <49%.   This generally indicates that at this time, the fetal head is longer than it is wide. The fetal head shape varies during gestation and can be affected by factors such as presentation (more common with breech presentation), the amniotic fluid level, and having a multiple gestation. Additionally, it can be a normal variant (if mild). If the condition persists, it may be suggestive of a genetic condition or syndrome and/or craniosynostosis. The fetal head measurements will be reassessed at her follow up ultrasound. 14.  Proteinuria -- The patient was previously noted to have trace protein on a urine dip. She was normotensive with a blood pressure of 109/73 and she denied any signs or symptoms of preeclampsia. She denied any signs or symptoms of a urinary tract infection. She was given orders to have a urine culture and urine P/C ratio. If any abnormalities are detected, she will be contacted with results and follow-up recommendations. Preeclampsia precautions were reviewed with the patient. The patient's preeclampsia panel was negative on 6/27/2022. The patient's urine culture was positive for E. coli. See below. The patient's urine dip was notable for trace protein today. Her blood pressure was normal at 112/71. The patient completed repeat blood work previously. Her urine protein creatinine ratio was normal at 0.1 and urinalysis negative. A urine culture was negative on 8/22/2022.            15.  Low vitamin D -- The patient's vitamin D was low at 30 on 5/17/2022  --Recommend vitamin D3 1000 IU daily  --Monitor levels serially  --Monitor nutrition panel q4-6 weeks (CBC, CMP, magnesium, ferritin, folate, vitamin B12, vitamin D25OH)     --Repeat testing completed on 6/27/2022, results included: H/H11.6/35.3, MCV 9.5, PT/INR 1.3/1.1, APTT 27.5, potassium 3.7, creatinine 0.7, calcium 9, ALT 11, AST 14, magnesium 2.1, ferritin 25, folate >20, vitamin B12 458, vitamin D 37, , uric acid 3.3, TSH 1.47, free T4 0.97, free T3 2.2, TPO negative, hemoglobin A1c 4.5%, urinalysis concerning for infection, urine protein creatinine ratio 0.2, urine culture positive for E. Coli. --The patient's vitamin D improved. She was counseled to continue her vitamin D supplement. -- Repeat testing completed 8/22/2022, her results included: H/H 11.6/34.2, MCV 87.7, platelet count 921,442, potassium 4, creatinine 0.6, calcium 9.4, ALT 11, AST 17, magnesium 1.8, uric acid 4.4, , Ferritin 32, folate 13.2, vitamin B12 339, vitamin D 34, TSH 2.55, free T4 0.95, free T3 2.5, TPO negative, hemoglobin A1c 5.1%, urinalysis negative, urine protein creatinine ratio 0.1, urine culture negative. -- The patient's vitamin D was stable at 29. She was counseled to continue her vitamin D supplement for the remainder of pregnancy and at least 6 to 8 weeks postpartum. --A repeat nutrition panel is recommended at her 6-week postpartum visit  --Follow up with PCP for long term monitoring and management     16. Low ferritin -- The patient's ferritin was low at 21 (considered low if <15 in absence of anemia or <40 in setting of anemia)  --Ferrous sulfate 325 mg BID prescribed  --Monitor levels serially  --Monitor nutrition panel q4-6 weeks (CBC, CMP, magnesium, ferritin, folate, vitamin B12, vitamin D25OH)     --Repeat testing completed 6/27/2022, the patient's ferritin level increased to 25. Her H/H was stable at 11.6/35.3.  --She was counseled to continue her iron supplement     --Repeat testing completed 8/22/2022, ferritin 32. --Patient was counseled to continue her iron supplement for the remainder of pregnancy and at least 6 to 8 weeks postpartum  --A repeat nutrition panel is recommended at her 6-week postpartum visit  --Follow up with PCP for long term monitoring and management     17. Elevated Glucola/normal 2-hour OGTT -- The patient's labs were reviewed. Her Glucola was elevated at 145 on 6/27/2022.   A 2-hour oral glucose tolerance test was ordered for the patient. She completed testing on 7/13/2022 and her results were normal.  Her test results included: 85/171/121. 18.  Vaginal irritation/yeast infection, resolved -- The patient had complaints of vaginal itching and burning on 6/28/2022. She reported that her symptoms started on Sunday, June 26. She denied having any significant changes in her discharge. A genital culture was done at her visit. It was positive for yeast.  She was previously contacted and prescribed Monistat 7. The patient reported that her symptoms have improved. She denies having any recurrent symptoms of infection. 19.  Bacteriuria, resolved -- The patient's urine culture from 6/27/2022 was positive for E. coli. The patient was symptomatic with dysuria during her visit on 6/28/2022. She was treated with Macrobid. The culture was sensitive to Macrobid. The patient reports that her symptoms have resolved. --Recommend repeat urine culture with next of labs  --Repeat urine culture negative 8/22/2022     20. Lagging HC -- The ultrasound from 31 weeks 6 days was reviewed with the patient. The head circumference was at the 6th percentile. The intracranial anatomy otherwise appeared normal.  The transverse versus cerebellar diameter was at the 83rd percentile. The patient was counseled that this is likely constitutional.  The head size will be monitored serially on follow-up ultrasound. The patient reports that her oldest daughter also had a small head circumference. Fetal growth was reevaluated at 35 weeks 3 days. The head circumference was normal at the 20th percentile. Fetal growth was reevaluated today at 37 weeks 3 days. The head circumference was at the 8th percentile. The intracranial anatomy otherwise appeared normal.       --The patient is scheduled for twice-weekly fetal testing. --The patient was counseled that she can have testing with her referring provider when scheduled for routine visits. -- The patient was counseled that she can follow-up with her referring provider for the remainder of her prenatal care and postpartum care. If any additional issues arise, I would be happy to see the patient back for follow-up consultation. --The patient was advised to call if she has any increased vaginal discharge, vaginal bleeding, contractions, abdominal pain, back pain or any new significant symptomatology prior to her next visit. I advised her that these are signs and symptoms of cervical change and require follow-up assessment when they occur. Preeclampsia precautions were also reviewed with the patient. --The patient was also counseled to call and/or return with any concerns for decreased fetal activity. --The patient is to continue to follow with you in your office for ongoing obstetric care. --The total time spent on today's visit was 30 minutes. This included preparation for the visit (i.e. reviewing prior external notes and test results), performance of a medically appropriate history and examination, counseling, orders for medications, tests or other procedures, and coordination of care. Greater than 50% of the time was spent face-to-face with the patient. This time is exclusive of procedures performed. I answered all of  the patient's questions to her satisfaction. I asked her to call if she had any additional questions prior to her next visit. --At the conclusion of the visit, the patient appeared to have a good understanding of the issues discussed. I answered all of her questions to her satisfaction. I asked her to call if she had any additional questions prior to her next visit. --Thank you for allowing me to participate in the care of this pleasant patient. Please don't hesitate to call me if you have any questions.       Sincerely,      Anshul Lucas MD, [facility]  [phone]      *All or parts of this note may have been generated using a voice recognition program. There may be typo, grammar, or Word substitution errors that have escaped my review of this note.

## 2022-08-29 NOTE — TELEPHONE ENCOUNTER
Patient needs CS moved to 38 weeks. Dr. Gracie Machado requesting for Dr. Prosper Rincon or Dr. Mercedes Figueroa if schedule will permit to assist on 0/8/94.   Please advise  Patient will need notified ASAP

## 2022-08-29 NOTE — PROGRESS NOTES
Doing well, no complaints  Feels pressure, but denies cxs or pelvic pain  Denies vaginal bleeding or LOF  + FM  FH is 38 cm  /58  No signs/symptoms of preeclampsia  Nst reactive today at [facility] office  Per Dr. Guillermina Garner, suggests repeat C/S be done at 38 weeks instead of 39  Will schedule for next week

## 2022-08-29 NOTE — PATIENT INSTRUCTIONS
Please arrive for your scheduled appointment at least 15 minutes early with your actual insurance card+ a photo ID. Also if you need any refills ordered or have questions, it may take up 48 hours to reply. Please allow ample time for your refills. Call me when you use last refill. Thank you for your cooperation. You might be having an NST at your next appt. Please eat a large snack or breakfast before coming to office. Thank you Self breast Call your primary obstetrician with bleeding, leaking of fluid, abdominal tenderness, headache, blurry vision, epigastric pain and increased urinary frequency. If you are experiencing an emergency and need immediate help, call 911 or go to go emergency room or labor and delivery. Do kick counts after dinner. Call your primary obstetrician if less than 10 kicks in 2 hours after dinner. Call your primary obstetrician with bleeding, leaking of fluid, abdominal tenderness, headache, blurry vision, epigastric pain and increased urinary frequency. Do kick counts after dinner. Call your primary obstetrician if less than 10 kicks in 2 hours after dinner. Call your primary obstetrician with bleeding, leaking of fluid, abdominal tenderness, headache, blurry vision, epigastric pain and increased urinary frequency.

## 2022-08-29 NOTE — LETTER
[facility] Maternal Fetal Medicine  [address] [facility] [address]  Phone: [phone]  Fax: [phone]           Danitza Mirza MD      2022    Patient: Minal Conway   MR Number: [id number]   YOB: 1980   Date of Visit: 2022       Dear Dr. Eric Marte:    Thank you for referring Minal Conway to me for evaluation/treatment. Below are the relevant portions of my assessment and plan of care. If you have questions, please do not hesitate to call me. I look forward to following [facility] along with you. Sincerely,        Danitza Mirza MD    CC providers:  Meena León MD  [address] [facility]  [address] [facility]       2022      Meena León MD  [facility],  [address] [place] [address]     RE:  Fabricio Ventura  : 1980   AGE: 46 y.o. This report has been created using voice recognition software. It may contain errors which are inherent in voice recognition technology. Dear Dr. Eric Marte:      I had the pleasure of meeting with Ms. Mcfarlane for a return consultation. As you know, Ms. Gerry Gastelum  is a 46 y.o.  at 37w3d ([address]) who is being followed by our office for multiple medical issues. Today, Ms. Mcfarlane reports that she feels well. She notes good fetal movement and denies any symptoms of leaking of fluid, vaginal bleeding, and/or contractions. She had a fetal ultrasound that was notable for the following. There is a single intrauterine gestation in a cephalic presentation with a heart rate of 154 beats per minute. The placenta is anterior. The amniotic fluid index is 18.4 cm. The composite gestational age is 35w0d. The estimated fetal weight is at the 31st percentile. BPP 10/10.   Umbilical artery PI normal.  MCA PSV normal.  CPR PI normal.      PERTINENT PHYSICAL EXAMINATION:   /71   Pulse 95   Wt 181 lb (82.1 kg)   LMP 2021   BMI 33.11 kg/m²     Urine dipstick: Negative for Glucose    Negative for Albumin      A fetal ultrasound assessment was performed today. A report is enclosed for your review. Assessment & Plan:  46 y.o.  at 37w3d ([address]) with:    1. Pregnancy dating -- The patient's pregnancy dating was previously reviewed. The patient reported that she had her Mirena removed on 2021. She was tracking her periods. Per the vickie she was using, she was having 25-day cycles. She reported a last menstrual period of 2021. However, she had 2 periods in December. Her first was on 2021. This would have been a 16-day cycle. The patient's initial ultrasound was reviewed. Based on her last menstrual period, she was 13 weeks 2 days, SHARA 2022. Based on the ultrasound, she was 14 weeks 3 days, SHARA 2022. There was an 8-day difference between dating by her LMP and the patient's initial ultrasound. Given there was greater than a 7-day discrepancy, the recommendation was made to use the 14-week ultrasound for dating. Thus, her SHARA is 2022 based on a 14-week ultrasound. The patient returned for an anatomy ultrasound. She was 19 weeks 3 days by her 14-week ultrasound. The composite gestational age was 22 weeks 5 days. Fetal growth was appropriate for the gestational age based on her 14-week ultrasound. The patient returns at 22 weeks 4 days. The composite gestational age was 25 weeks gestation and again agreed with dating by her 14-week ultrasound. The patient returned at 25 weeks 5 days. Fetal growth was again appropriate for the gestational age based on dating by her 14-week ultrasound. The patient returned at 28 weeks 4 days. Fetal growth was again appropriate for the gestational age based on dating by her 14-week ultrasound. The patient returned at 31 weeks 4 days. Fetal growth was again appropriate for the gestational age. There was a slight lag in the head circumference. Fetal growth was reevaluated again today at 37 weeks 3 days and noted to be appropriate for the gestational age. The estimate of fetal weight was at the 31st percentile. 2.  Advanced maternal age  -- The patient was previously counseled regarding the implications of advanced maternal age in pregnancy, specifically that of aneuploidy. Counseling was reviewed. The patient did not have any additional questions or concerns. The patient previously completed screening with NIPT. Her results were previously reviewed. The fetal fraction was 9% and the results were low risk for aneuploidy. Per the report, the fetus is female. The genetic screening results were reviewed with the patient. The patient declined any additional diagnostic testing. Since the patient had early screening with NIPT, a maternal serum AFP is recommended between 15 and 22 weeks to screen for open neural tube defects. A maternal serum AFP was normal at 1.02 MOM. Again, recent studies have reported that there may be an increased risk for fetal loss in the setting of advanced maternal age. Thus, increased surveillance is recommended. I recommend monitoring fetal growth serially, every 4 weeks beginning at 24-26 weeks' gestation. She should monitor fetal kick counts daily starting at 28 weeks' gestation. At 36 weeks' gestation, she should be scheduled for a weekly non stress test or biophysical profile. I recommend delivery at 44 week's gestation. Given there is an increased risk for hypertensive disorders of pregnancy in the setting of advanced maternal age, the possible utility of a low dose aspirin in reducing her risk for hypertensive disorders of pregnancy was reviewed. The risks and benefits of low dose aspirin were discussed. She was counseled to continue taking a daily low-dose aspirin for the remainder of pregnancy and 6 to 8 weeks postpartum.      3. History of  X3 -- The patient's first pregnancy was 87, platelet count 955,734, potassium 3.8, creatinine 0.7, calcium 9.2, ALT 20, AST 18, ferritin 28, folate >20, vitamin B12 591, vitamin D 31, magnesium 1.9, TSH 2.9, free T4 1.19, free T3 3, TPO negative, antithyroglobulin antibody negative, hemoglobin A1c 4.8%, urinalysis negative, urine culture negative, urine protein creatinine ratio 0.1.  --Additional recommendations are below. 5.  History of macrosomia -- The patient's third child weighed 9 pounds 1 ounce at 39 weeks gestation. She had a repeat  for delivery. She denies having a history of gestational diabetes. Because of this history, she is at risk for having another child with macrosomia. Fetal growth should be monitored serially, every 3 to 4 weeks starting at 24 to 26 weeks gestation. Fetal growth was appropriate for the gestational age at 34 weeks 4 days. Fetal growth was reevaluated at 37 weeks 3 days and again appropriate for the gestational age. The estimate of fetal weight was at the 31st percentile. A baseline hemoglobin A1c was checked on 3/30/2022. It was normal at 4.8%. The patient had a baseline Glucola on 3/8/2022 that was normal at 128. She should have repeat screening at 24 to 28 weeks gestation. 6.  Work exposures -- The patient previously indicated that she works as an [employer]. She was counseled that she may be at increased risk for viral exposures such as CMV and parvovirus. She was given orders to have baseline screening for these viruses. Baseline screening was completed on 3/30/2022. Her results included: CMV IgG positive/IgM negative, parvovirus IgG positive/IgM negative. The results indicate past exposure to CMV and immunity to parvovirus. The results were previously reviewed with the patient. Precautions were reviewed. 7.  Family history of bleeding disorder -- The patient reported that her nephew (sister's son) has some type of bleeding disorder.   She was unsure of the name of the condition. She was encouraged to obtain additional information regarding her nephew's condition. The patient previously reported that her nephew has an issue with factor V. She was unsure if he has factor V Leiden or factor V deficiency. She states that the condition was inherited from his father. The patient reports that her sister was tested and does not have this condition. Subsequently, the patient reported that her nephew has a the factor V Leiden gene mutation. He actually has a clotting disorder. Again, he inherited this condition from his father. The patient's sister does not have the factor V Leiden mutation. The patient was previously counseled that if her sister does not have this condition or gene, that I would not anticipate any increased risk for the patient or her child. 8.  Family history of thyroid disease -- The patient's family history was previously reviewed and notable for thyroid disease in her mother. Given this family history, baseline thyroid function studies and a screening thyroid peroxidase antibody/antithyroglobulin antibody were recommended. The patient completed baseline screening on 3/30/2022, her results included: TSH 2.9, free T4 1.19, free T3 3, TPO negative, antithyroglobulin antibody negative. The results were previously reviewed with the patient. 9.  History of ovarian cyst -- The patient reported a history of an ovarian cyst.  She was unsure which side the cyst was on. She was supposed to have follow-up with her primary OB/GYN but did not have the follow-up imaging. The ovaries were evaluated previously and appeared normal bilaterally. No cystic structures were seen in the adnexa. The reassuring findings were reviewed with the patient. 10.  Vaccination in pregnancy -- The patient was  previously counseled regarding the recommendations for vaccination in pregnancy. Counseling was reviewed.   The patient did not have any additional questions or concerns. The patient was counseled regarding the recommendations for the Tdap vaccination in pregnancy. Risks and benefits were discussed. The vaccination is typically administered between 27 and 36 weeks gestation and recommended to confer protection against whooping cough to the . The patient plans to discuss this with her primary provider at her next visit. The patient was also counseled that her partner in any individuals who will be in close contact with the  should also be vaccinated for whooping cough. The patient was counseled regarding recommendation for the flu vaccination in pregnancy for both maternal and infant safety. Risks and benefits were reviewed. She was encouraged to have this vaccination as an outpatient. --She received a flu vaccination     Counseling was provided regarding the recommendation for the Covid vaccination in pregnancy. I advised the patient that the [employer] of Obstetrics and Gynecology and The Society for Maternal Fetal Medicine recommend that all pregnant women be vaccinated for COVID-19. \"Data have shown that COVID-19 infection puts pregnant people at increased risk of severe complications and even death; yet only about 22% of pregnant individuals have received 1 more doses of COVID-19 vaccine according to the [employer] for Disease Control and Prevention. \"     \" Recent data have shown that more than 95% of those were hospitalized and/or dying from COVID-19 are those who have remained unvaccinated. Pregnant individuals who have decided to wait until after delivery to be vaccinated may be inadvertently exposing themselves to an increased risk of severe illness or death. \"     \" COVID-19 vaccination is the best testing to reduce maternal and fetal complications of QCVOJ-70 infection among pregnant people,\" said Genevive Goldmann, MD, president of the Society for Maternal Fetal Medicine, sub-specialists. The patient was counseled that in the event she opts not to have the Covid vaccination, she should alert her provider immediately if she test positive for Covid. Pregnant women are on the priority list for treatment with monoclonal antibody. This intervention has been shown to decrease the risk for hospitalization and complications related to Covid in pregnancy. --She received a Covid vaccination     The patient expressed verbal understanding of this counseling. 11.   Nausea and vomiting of pregnancy, improved -- The patients previously again reports that her nausea and vomiting symptoms are significantly improved. She has gained an additional 1 pound since her last visit. She still has occasional symptoms of nausea. She denies any signs of symptoms of dehydration. Precautions were reviewed. The patient was again encouraged to eat small amounts of food every 2-3 hours during the day. He was also encouraged to stay well-hydrated. A nutrition panel (CBC, CMP, magnesium, ferritin, folate, vitamin B12, vitamin D 25 [place]) was recommended. Baseline testing was completed on 3/30/2022, the results are summarized above. Repeat testing was completed on 5/17/2022, her results included: H/H 11.6/36.5, MCV 93.1, platelet count 223,162, potassium 4.3, creatinine 0.7, calcium 9.4, ALT 15, AST 16, magnesium 1.9, ferritin 21, folate 18.7, vitamin B12 464, vitamin D 30, TSH 2.97, free T4 1.04, free T3 2.5, TPO negative, uric acid 3.5, , urine protein creatinine ratio 0.1 urine culture negative. --Additional recommendations are below        12. Constipation, improving -- The patient previously had complaints of constipation. She again reports that her symptoms are significantly better. She was again counseled to stay well hydrated and increase fiber, fruit, and vegetable intake. Increased fiber intake was encouraged. She can use colace daily. A prescription was previously provided.  She can use a gentle laxative as needed. She can also use a probiotic as needed. Precautions were reviewed. 13.  Dolichocephaly, resolved-- The ultrasound from 31 weeks 6 days was reviewed with the patient. The cephalic index was again normal at 81%. Fetal growth was reevaluated at 35 weeks 3 days. The cephalic index was again normal at 76%. Fetal growth was reevaluated today at 37 weeks 3 days. The cephalic index was again normal at 76%. Previously, at 22 weeks 4 days, there was dolichocephaly in that the cephalic index is again 92% (normal >75%). The patient was counseled that there is concern for dolichocephaly given the cephalic index is <26%. This generally indicates that at this time, the fetal head is longer than it is wide. The fetal head shape varies during gestation and can be affected by factors such as presentation (more common with breech presentation), the amniotic fluid level, and having a multiple gestation. Additionally, it can be a normal variant (if mild). If the condition persists, it may be suggestive of a genetic condition or syndrome and/or craniosynostosis. The fetal head measurements will be reassessed at her follow up ultrasound. 14.  Proteinuria -- The patient was previously noted to have trace protein on a urine dip. She was normotensive with a blood pressure of 109/73 and she denied any signs or symptoms of preeclampsia. She denied any signs or symptoms of a urinary tract infection. She was given orders to have a urine culture and urine P/C ratio. If any abnormalities are detected, she will be contacted with results and follow-up recommendations. Preeclampsia precautions were reviewed with the patient. The patient's preeclampsia panel was negative on 6/27/2022. The patient's urine culture was positive for E. coli. See below. The patient's urine dip was notable for trace protein today. Her blood pressure was normal at 112/71.      The patient completed ferritin, folate, vitamin B12, vitamin D25OH)     --Repeat testing completed 6/27/2022, the patient's ferritin level increased to 25. Her H/H was stable at 11.6/35.3.  --She was counseled to continue her iron supplement     --Repeat testing completed 8/22/2022, ferritin 32. --Patient was counseled to continue her iron supplement for the remainder of pregnancy and at least 6 to 8 weeks postpartum  --A repeat nutrition panel is recommended at her 6-week postpartum visit  --Follow up with PCP for long term monitoring and management     17. Elevated Glucola/normal 2-hour OGTT -- The patient's labs were reviewed. Her Glucola was elevated at 145 on 6/27/2022. A 2-hour oral glucose tolerance test was ordered for the patient. She completed testing on 7/13/2022 and her results were normal.  Her test results included: 85/171/121. 18.  Vaginal irritation/yeast infection, resolved -- The patient had complaints of vaginal itching and burning on 6/28/2022. She reported that her symptoms started on Sunday, June 26. She denied having any significant changes in her discharge. A genital culture was done at her visit. It was positive for yeast.  She was previously contacted and prescribed Monistat 7. The patient reported that her symptoms have improved. She denies having any recurrent symptoms of infection. 19.  Bacteriuria, resolved -- The patient's urine culture from 6/27/2022 was positive for E. coli. The patient was symptomatic with dysuria during her visit on 6/28/2022. She was treated with Macrobid. The culture was sensitive to Macrobid. The patient reports that her symptoms have resolved. --Recommend repeat urine culture with next of labs  --Repeat urine culture negative 8/22/2022     20. Lagging HC -- The ultrasound from 31 weeks 6 days was reviewed with the patient. The head circumference was at the 6th percentile.   The intracranial anatomy otherwise appeared normal.  The transverse versus cerebellar diameter was at the 83rd percentile. The patient was counseled that this is likely constitutional.  The head size will be monitored serially on follow-up ultrasound. The patient reports that her oldest daughter also had a small head circumference. Fetal growth was reevaluated at 35 weeks 3 days. The head circumference was normal at the 20th percentile. Fetal growth was reevaluated today at 37 weeks 3 days. The head circumference was at the 8th percentile. The intracranial anatomy otherwise appeared normal.       --The patient is scheduled for twice-weekly fetal testing. --The patient was counseled that she can have testing with her referring provider when scheduled for routine visits. -- The patient was counseled that she can follow-up with her referring provider for the remainder of her prenatal care and postpartum care. If any additional issues arise, I would be happy to see the patient back for follow-up consultation. --The patient was advised to call if she has any increased vaginal discharge, vaginal bleeding, contractions, abdominal pain, back pain or any new significant symptomatology prior to her next visit. I advised her that these are signs and symptoms of cervical change and require follow-up assessment when they occur. Preeclampsia precautions were also reviewed with the patient. --The patient was also counseled to call and/or return with any concerns for decreased fetal activity. --The patient is to continue to follow with you in your office for ongoing obstetric care. --The total time spent on today's visit was 30 minutes. This included preparation for the visit (i.e. reviewing prior external notes and test results), performance of a medically appropriate history and examination, counseling, orders for medications, tests or other procedures, and coordination of care. Greater than 50% of the time was spent face-to-face with the patient.   This time is exclusive of procedures performed. I answered all of  the patient's questions to her satisfaction. I asked her to call if she had any additional questions prior to her next visit. --At the conclusion of the visit, the patient appeared to have a good understanding of the issues discussed. I answered all of her questions to her satisfaction. I asked her to call if she had any additional questions prior to her next visit. --Thank you for allowing me to participate in the care of this pleasant patient. Please don't hesitate to call me if you have any questions. Sincerely,      Xenia Benoit MD, [facility] [place] [address]  [phone]      *All or parts of this note may have been generated using a voice recognition program. There may be typo, grammar, or Word substitution errors that have escaped my review of this note.

## 2022-08-30 ENCOUNTER — TELEPHONE (OUTPATIENT)
Dept: ADMINISTRATIVE | Age: 42
End: 2022-08-30

## 2022-08-30 NOTE — TELEPHONE ENCOUNTER
Patient is having a  on  and per patient needs to see Dr Delaney Styles the Tuesday before. .per orders Dr Claudette Neal. Please call patient and schedule. THank you!

## 2022-09-01 ENCOUNTER — ROUTINE PRENATAL (OUTPATIENT)
Dept: OBGYN CLINIC | Age: 42
End: 2022-09-01
Payer: COMMERCIAL

## 2022-09-01 VITALS
SYSTOLIC BLOOD PRESSURE: 118 MMHG | BODY MASS INDEX: 31.71 KG/M2 | WEIGHT: 179 LBS | HEIGHT: 63 IN | HEART RATE: 84 BPM | DIASTOLIC BLOOD PRESSURE: 76 MMHG

## 2022-09-01 DIAGNOSIS — Z98.891 HISTORY OF C-SECTION: ICD-10-CM

## 2022-09-01 DIAGNOSIS — O09.521 MULTIGRAVIDA OF ADVANCED MATERNAL AGE IN FIRST TRIMESTER: ICD-10-CM

## 2022-09-01 DIAGNOSIS — O09.523 ELDERLY MULTIGRAVIDA, THIRD TRIMESTER: Primary | ICD-10-CM

## 2022-09-01 LAB
GLUCOSE URINE, POC: NORMAL
PROTEIN UA: NEGATIVE

## 2022-09-01 PROCEDURE — 81002 URINALYSIS NONAUTO W/O SCOPE: CPT | Performed by: OBSTETRICS & GYNECOLOGY

## 2022-09-01 PROCEDURE — 1036F TOBACCO NON-USER: CPT | Performed by: OBSTETRICS & GYNECOLOGY

## 2022-09-01 PROCEDURE — 59025 FETAL NON-STRESS TEST: CPT | Performed by: OBSTETRICS & GYNECOLOGY

## 2022-09-01 PROCEDURE — 99212 OFFICE O/P EST SF 10 MIN: CPT | Performed by: OBSTETRICS & GYNECOLOGY

## 2022-09-01 PROCEDURE — 99213 OFFICE O/P EST LOW 20 MIN: CPT | Performed by: OBSTETRICS & GYNECOLOGY

## 2022-09-01 PROCEDURE — G8427 DOCREV CUR MEDS BY ELIG CLIN: HCPCS | Performed by: OBSTETRICS & GYNECOLOGY

## 2022-09-01 PROCEDURE — G8419 CALC BMI OUT NRM PARAM NOF/U: HCPCS | Performed by: OBSTETRICS & GYNECOLOGY

## 2022-09-01 RX ORDER — ASPIRIN 81 MG/1
81 TABLET, CHEWABLE ORAL DAILY
Qty: 30 TABLET | Refills: 6 | Status: SHIPPED | OUTPATIENT
Start: 2022-09-01

## 2022-09-01 NOTE — PROGRESS NOTES
2022      [employer], [address] [facility],  [address]     RE:  Betty Minor  : 1980   AGE: 46 y.o. This report has been created using voice recognition software. It may contain errors which are inherent in voice recognition technology. Dear Dr. Zan aVnce:      I had the pleasure of meeting with Ms. Mcfarlane for fetal testing. As you know, Ms. Marco Antonio Santos  is a 46 y.o.  at 41w10d ([address]) who is being followed by our office for advanced maternal age and other medical issues. Today, Ms. Mcfarlane reports that she feels well. She notes good fetal movement and denies any symptoms of leaking of fluid, vaginal bleeding, and/or contractions. She presented today for fetal testing. She had a nonstress test that was reactive. PERTINENT PHYSICAL EXAMINATION:   /76   Pulse 84   Ht 5' 3\" (1.6 m)   Wt 179 lb (81.2 kg)   LMP 2021   BMI 31.71 kg/m²     Urine dipstick:   Negative for Glucose    Negative for Albumin      A fetal ultrasound assessment was performed today. A report is enclosed for your review. Assessment & Plan:  46 y.o.  at 37w6d (14 wk US) with:    1. Fetal testing -- The patient presented to the office today for fetal testing secondary to advanced maternal age. The patient did not have any concerns today. The patient had questions regarding follow-up. She is currently scheduled for a return OB visit on 2022 with her referring provider. She is then scheduled for a repeat  on 2022. Delivery was recommended at 38 weeks gestation secondary to her history of 3 prior C-sections. The patient was counseled that she does not need to follow-up with maternal-fetal medicine. She was counseled to follow-up with her referring provider on 2022. She should receive preoperative instructions at that visit. Labor, PROM, bleeding, and preeclampsia precautions were reviewed with the patient today.   She was counseled to present to the hospital with any concerns. --The patient was counseled to follow-up with her referring provider for the remainder of her prenatal care and postpartum care. If any additional issues arise, I would be happy to see the patient back for a return consultation. --The patient was advised to call if she has any increased vaginal discharge, vaginal bleeding, contractions, abdominal pain, back pain or any new significant symptomatology prior to her next visit. I advised her that these are signs and symptoms of cervical change and require follow-up assessment when they occur. Preeclampsia precautions were also reviewed with the patient. --The patient is to continue to follow with you in your office for ongoing obstetric care. --The total time spent on today's visit was 10 minutes. This included preparation for the visit (i.e. reviewing prior external notes and test results), performance of a medically appropriate history and examination, counseling, orders for medications, tests or other procedures, and coordination of care. Greater than 50% of the time was spent face-to-face with the patient. This time is exclusive of procedures performed. I answered all of  the patient's questions to her satisfaction. I asked her to call if she had any additional questions prior to her next visit. --At the conclusion of the visit, the patient appeared to have a good understanding of the issues discussed. I answered all of her questions to her satisfaction. I asked her to call if she had any additional questions prior to her next visit. --Thank you for allowing me to participate in the care of this pleasant patient. Please don't hesitate to call me if you have any questions.       Sincerely,      Rock Pk MD, [facility] [place] [address]  [phone]      *All or parts of this note may have been generated using a voice recognition program. There may be typo, grammar, or Word substitution errors that have escaped my review of this note.

## 2022-09-01 NOTE — PROGRESS NOTES
Patient is here today for NST only. Denies any vaginal bleeding, cramping, or leakage of fluids. Patient reports good fetal movement.

## 2022-09-01 NOTE — LETTER
[facility] Maternal Fetal Medicine  [address]  [facility] [address]  Phone: [phone]  Fax: [phone]           Brant Abdul MD      2022     Patient: Nadira Ornelas   MR Number: [id number]   YOB: 1980   Date of Visit: 2022       Dear Dr. Case Magana:    Thank you for referring Nadira Ornelas to me for evaluation/treatment. Below are the relevant portions of my assessment and plan of care. If you have questions, please do not hesitate to call me. I look forward to following Pleas Cheek along with you. Sincerely,        Brant Abudl MD    CC providers:  Olivia Lopez MD  [address] [facility]  [address] [facility]       2022      Olivia Lopez MD  [address]  [place],  [address] [facility]     RE:  Jay Augustine  : 1980   AGE: 46 y.o. This report has been created using voice recognition software. It may contain errors which are inherent in voice recognition technology. Dear Dr. Case Magana:      I had the pleasure of meeting with Ms. Mcfarlane for fetal testing. As you know, Ms. Brendon Olmso  is a 46 y.o.  at 41w10d ([address]) who is being followed by our office for advanced maternal age and other medical issues. Today, Ms. Mcfarlane reports that she feels well. She notes good fetal movement and denies any symptoms of leaking of fluid, vaginal bleeding, and/or contractions. She presented today for fetal testing. She had a nonstress test that was reactive. PERTINENT PHYSICAL EXAMINATION:   /76   Pulse 84   Ht 5' 3\" (1.6 m)   Wt 179 lb (81.2 kg)   LMP 2021   BMI 31.71 kg/m²     Urine dipstick:   Negative for Glucose    Negative for Albumin      A fetal ultrasound assessment was performed today. A report is enclosed for your review. Assessment & Plan:  46 y.o.  at 37w6d (14 wk US) with:    1.   Fetal testing -- The patient presented to the office today for fetal testing secondary to advanced maternal age. The patient did not have any concerns today. The patient had questions regarding follow-up. She is currently scheduled for a return OB visit on 2022 with her referring provider. She is then scheduled for a repeat  on 2022. Delivery was recommended at 38 weeks gestation secondary to her history of 3 prior C-sections. The patient was counseled that she does not need to follow-up with maternal-fetal medicine. She was counseled to follow-up with her referring provider on 2022. She should receive preoperative instructions at that visit. Labor, PROM, bleeding, and preeclampsia precautions were reviewed with the patient today. She was counseled to present to the hospital with any concerns. --The patient was counseled to follow-up with her referring provider for the remainder of her prenatal care and postpartum care. If any additional issues arise, I would be happy to see the patient back for a return consultation. --The patient was advised to call if she has any increased vaginal discharge, vaginal bleeding, contractions, abdominal pain, back pain or any new significant symptomatology prior to her next visit. I advised her that these are signs and symptoms of cervical change and require follow-up assessment when they occur. Preeclampsia precautions were also reviewed with the patient. --The patient is to continue to follow with you in your office for ongoing obstetric care. --The total time spent on today's visit was 10 minutes. This included preparation for the visit (i.e. reviewing prior external notes and test results), performance of a medically appropriate history and examination, counseling, orders for medications, tests or other procedures, and coordination of care. Greater than 50% of the time was spent face-to-face with the patient. This time is exclusive of procedures performed.    I answered all of  the patient's questions to her satisfaction. I asked her to call if she had any additional questions prior to her next visit. --At the conclusion of the visit, the patient appeared to have a good understanding of the issues discussed. I answered all of her questions to her satisfaction. I asked her to call if she had any additional questions prior to her next visit. --Thank you for allowing me to participate in the care of this pleasant patient. Please don't hesitate to call me if you have any questions. Sincerely,      Earnest Varner MD, [facility] [address]  [phone]      *All or parts of this note may have been generated using a voice recognition program. There may be typo, grammar, or Word substitution errors that have escaped my review of this note.

## 2022-09-05 ENCOUNTER — ANESTHESIA EVENT (OUTPATIENT)
Dept: LABOR AND DELIVERY | Age: 42
DRG: 540 | End: 2022-09-05
Payer: COMMERCIAL

## 2022-09-06 ENCOUNTER — ROUTINE PRENATAL (OUTPATIENT)
Dept: OBGYN | Age: 42
End: 2022-09-06
Payer: COMMERCIAL

## 2022-09-06 VITALS
SYSTOLIC BLOOD PRESSURE: 118 MMHG | WEIGHT: 180 LBS | HEART RATE: 97 BPM | DIASTOLIC BLOOD PRESSURE: 60 MMHG | BODY MASS INDEX: 31.89 KG/M2

## 2022-09-06 DIAGNOSIS — O09.293 HISTORY OF MACROSOMIA IN INFANT IN PRIOR PREGNANCY, CURRENTLY PREGNANT IN THIRD TRIMESTER: ICD-10-CM

## 2022-09-06 DIAGNOSIS — Z98.891 HISTORY OF CESAREAN DELIVERY: ICD-10-CM

## 2022-09-06 DIAGNOSIS — Z34.93 PRENATAL CARE IN THIRD TRIMESTER: Primary | ICD-10-CM

## 2022-09-06 DIAGNOSIS — O09.523 MULTIGRAVIDA OF ADVANCED MATERNAL AGE IN THIRD TRIMESTER: ICD-10-CM

## 2022-09-06 LAB
GLUCOSE URINE, POC: NORMAL
PROTEIN UA: NEGATIVE

## 2022-09-06 PROCEDURE — G8419 CALC BMI OUT NRM PARAM NOF/U: HCPCS | Performed by: OBSTETRICS & GYNECOLOGY

## 2022-09-06 PROCEDURE — 99213 OFFICE O/P EST LOW 20 MIN: CPT | Performed by: OBSTETRICS & GYNECOLOGY

## 2022-09-06 PROCEDURE — 1036F TOBACCO NON-USER: CPT | Performed by: OBSTETRICS & GYNECOLOGY

## 2022-09-06 PROCEDURE — G8428 CUR MEDS NOT DOCUMENT: HCPCS | Performed by: OBSTETRICS & GYNECOLOGY

## 2022-09-06 PROCEDURE — 81002 URINALYSIS NONAUTO W/O SCOPE: CPT | Performed by: OBSTETRICS & GYNECOLOGY

## 2022-09-06 PROCEDURE — 99212 OFFICE O/P EST SF 10 MIN: CPT | Performed by: OBSTETRICS & GYNECOLOGY

## 2022-09-06 NOTE — PROGRESS NOTES
Here for routine prenatal today. . Scheduled for repeat LTCS tomorrow with Kameron Cristobal. Discussed the surgery at some length today. Surgery at 1200, needs to be at the hospital at 1000. NPO after midnight tonight. Baby active, no complaints today. BP is fine, no edema.

## 2022-09-06 NOTE — PROGRESS NOTES
Patient alert and pleasant with no complaints. Here today for prenatal visit. Fetal heart tones obtained without difficulty. Urine for glucose and protein obtained with negative results. Discharge instructions have been discussed with the patient. Patient advised to call our office with any questions or concerns. Voiced understanding.     CS scheduled for 9/7/22 @ [address]

## 2022-09-07 ENCOUNTER — HOSPITAL ENCOUNTER (INPATIENT)
Age: 42
LOS: 2 days | Discharge: HOME | DRG: 540 | End: 2022-09-09
Attending: OBSTETRICS & GYNECOLOGY | Admitting: STUDENT IN AN ORGANIZED HEALTH CARE EDUCATION/TRAINING PROGRAM
Payer: COMMERCIAL

## 2022-09-07 ENCOUNTER — ANESTHESIA (OUTPATIENT)
Dept: LABOR AND DELIVERY | Age: 42
DRG: 540 | End: 2022-09-07
Payer: COMMERCIAL

## 2022-09-07 DIAGNOSIS — R10.9 ACUTE POSTOPERATIVE ABDOMINAL PAIN: Primary | ICD-10-CM

## 2022-09-07 DIAGNOSIS — G89.18 ACUTE POSTOPERATIVE ABDOMINAL PAIN: Primary | ICD-10-CM

## 2022-09-07 PROBLEM — Z3A.38 38 WEEKS GESTATION OF PREGNANCY: Status: ACTIVE | Noted: 2022-09-07

## 2022-09-07 LAB
ABO/RH: NORMAL
AMPHETAMINE SCREEN, URINE: NOT DETECTED
ANTIBODY SCREEN: NORMAL
BARBITURATE SCREEN URINE: NOT DETECTED
BENZODIAZEPINE SCREEN, URINE: NOT DETECTED
CANNABINOID SCREEN URINE: NOT DETECTED
COCAINE METABOLITE SCREEN URINE: NOT DETECTED
FENTANYL SCREEN, URINE: NOT DETECTED
HCT VFR BLD CALC: 34.5 % (ref 34–48)
HEMOGLOBIN: 11.6 G/DL (ref 11.5–15.5)
Lab: NORMAL
MCH RBC QN AUTO: 29.4 PG (ref 26–35)
MCHC RBC AUTO-ENTMCNC: 33.6 % (ref 32–34.5)
MCV RBC AUTO: 87.3 FL (ref 80–99.9)
METHADONE SCREEN, URINE: NOT DETECTED
OPIATE SCREEN URINE: NOT DETECTED
OXYCODONE URINE: NOT DETECTED
PDW BLD-RTO: 13.1 FL (ref 11.5–15)
PHENCYCLIDINE SCREEN URINE: NOT DETECTED
PLATELET # BLD: 334 E9/L (ref 130–450)
PMV BLD AUTO: 10.9 FL (ref 7–12)
RBC # BLD: 3.95 E12/L (ref 3.5–5.5)
WBC # BLD: 11.7 E9/L (ref 4.5–11.5)

## 2022-09-07 PROCEDURE — 6370000000 HC RX 637 (ALT 250 FOR IP): Performed by: STUDENT IN AN ORGANIZED HEALTH CARE EDUCATION/TRAINING PROGRAM

## 2022-09-07 PROCEDURE — 6370000000 HC RX 637 (ALT 250 FOR IP): Performed by: ANESTHESIOLOGY

## 2022-09-07 PROCEDURE — 3700000001 HC ADD 15 MINUTES (ANESTHESIA): Performed by: STUDENT IN AN ORGANIZED HEALTH CARE EDUCATION/TRAINING PROGRAM

## 2022-09-07 PROCEDURE — 59514 CESAREAN DELIVERY ONLY: CPT | Performed by: OBSTETRICS & GYNECOLOGY

## 2022-09-07 PROCEDURE — 86850 RBC ANTIBODY SCREEN: CPT

## 2022-09-07 PROCEDURE — 1220000000 HC SEMI PRIVATE OB R&B

## 2022-09-07 PROCEDURE — 86900 BLOOD TYPING SEROLOGIC ABO: CPT

## 2022-09-07 PROCEDURE — 85027 COMPLETE CBC AUTOMATED: CPT

## 2022-09-07 PROCEDURE — 2500000003 HC RX 250 WO HCPCS: Performed by: ANESTHESIOLOGY

## 2022-09-07 PROCEDURE — 36415 COLL VENOUS BLD VENIPUNCTURE: CPT

## 2022-09-07 PROCEDURE — A4216 STERILE WATER/SALINE, 10 ML: HCPCS | Performed by: ANESTHESIOLOGY

## 2022-09-07 PROCEDURE — 2580000003 HC RX 258

## 2022-09-07 PROCEDURE — 59514 CESAREAN DELIVERY ONLY: CPT | Performed by: STUDENT IN AN ORGANIZED HEALTH CARE EDUCATION/TRAINING PROGRAM

## 2022-09-07 PROCEDURE — 2709999900 HC NON-CHARGEABLE SUPPLY: Performed by: STUDENT IN AN ORGANIZED HEALTH CARE EDUCATION/TRAINING PROGRAM

## 2022-09-07 PROCEDURE — 3609079900 HC CESAREAN SECTION: Performed by: STUDENT IN AN ORGANIZED HEALTH CARE EDUCATION/TRAINING PROGRAM

## 2022-09-07 PROCEDURE — 6360000002 HC RX W HCPCS: Performed by: ANESTHESIOLOGY

## 2022-09-07 PROCEDURE — 7100000001 HC PACU RECOVERY - ADDTL 15 MIN: Performed by: STUDENT IN AN ORGANIZED HEALTH CARE EDUCATION/TRAINING PROGRAM

## 2022-09-07 PROCEDURE — 2580000003 HC RX 258: Performed by: STUDENT IN AN ORGANIZED HEALTH CARE EDUCATION/TRAINING PROGRAM

## 2022-09-07 PROCEDURE — 7100000000 HC PACU RECOVERY - FIRST 15 MIN: Performed by: STUDENT IN AN ORGANIZED HEALTH CARE EDUCATION/TRAINING PROGRAM

## 2022-09-07 PROCEDURE — 99222 1ST HOSP IP/OBS MODERATE 55: CPT | Performed by: PHYSICIAN ASSISTANT

## 2022-09-07 PROCEDURE — 3700000000 HC ANESTHESIA ATTENDED CARE: Performed by: STUDENT IN AN ORGANIZED HEALTH CARE EDUCATION/TRAINING PROGRAM

## 2022-09-07 PROCEDURE — 6360000002 HC RX W HCPCS

## 2022-09-07 PROCEDURE — 80307 DRUG TEST PRSMV CHEM ANLYZR: CPT

## 2022-09-07 PROCEDURE — 2500000003 HC RX 250 WO HCPCS

## 2022-09-07 PROCEDURE — 86901 BLOOD TYPING SEROLOGIC RH(D): CPT

## 2022-09-07 PROCEDURE — 2580000003 HC RX 258: Performed by: ANESTHESIOLOGY

## 2022-09-07 PROCEDURE — 6360000002 HC RX W HCPCS: Performed by: STUDENT IN AN ORGANIZED HEALTH CARE EDUCATION/TRAINING PROGRAM

## 2022-09-07 RX ORDER — SODIUM CHLORIDE, SODIUM LACTATE, POTASSIUM CHLORIDE, AND CALCIUM CHLORIDE .6; .31; .03; .02 G/100ML; G/100ML; G/100ML; G/100ML
1000 INJECTION, SOLUTION INTRAVENOUS ONCE
Status: COMPLETED | OUTPATIENT
Start: 2022-09-07 | End: 2022-09-07

## 2022-09-07 RX ORDER — SODIUM CHLORIDE 0.9 % (FLUSH) 0.9 %
5-40 SYRINGE (ML) INJECTION EVERY 12 HOURS SCHEDULED
Status: DISCONTINUED | OUTPATIENT
Start: 2022-09-07 | End: 2022-09-09 | Stop reason: HOSPADM

## 2022-09-07 RX ORDER — ONDANSETRON 2 MG/ML
INJECTION INTRAMUSCULAR; INTRAVENOUS PRN
Status: DISCONTINUED | OUTPATIENT
Start: 2022-09-07 | End: 2022-09-07 | Stop reason: SDUPTHER

## 2022-09-07 RX ORDER — OXYCODONE HYDROCHLORIDE 5 MG/1
5 TABLET ORAL EVERY 4 HOURS PRN
Status: DISCONTINUED | OUTPATIENT
Start: 2022-09-07 | End: 2022-09-09 | Stop reason: HOSPADM

## 2022-09-07 RX ORDER — NALBUPHINE HCL 10 MG/ML
5 AMPUL (ML) INJECTION EVERY 4 HOURS PRN
Status: DISPENSED | OUTPATIENT
Start: 2022-09-07 | End: 2022-09-08

## 2022-09-07 RX ORDER — SODIUM CHLORIDE 0.9 % (FLUSH) 0.9 %
5-40 SYRINGE (ML) INJECTION PRN
Status: DISCONTINUED | OUTPATIENT
Start: 2022-09-07 | End: 2022-09-09 | Stop reason: HOSPADM

## 2022-09-07 RX ORDER — METOCLOPRAMIDE HYDROCHLORIDE 5 MG/ML
10 INJECTION INTRAMUSCULAR; INTRAVENOUS ONCE
Status: COMPLETED | OUTPATIENT
Start: 2022-09-07 | End: 2022-09-07

## 2022-09-07 RX ORDER — BUPIVACAINE HYDROCHLORIDE 7.5 MG/ML
INJECTION, SOLUTION INTRASPINAL PRN
Status: DISCONTINUED | OUTPATIENT
Start: 2022-09-07 | End: 2022-09-07 | Stop reason: SDUPTHER

## 2022-09-07 RX ORDER — NALOXONE HYDROCHLORIDE 0.4 MG/ML
INJECTION, SOLUTION INTRAMUSCULAR; INTRAVENOUS; SUBCUTANEOUS PRN
Status: ACTIVE | OUTPATIENT
Start: 2022-09-07 | End: 2022-09-08

## 2022-09-07 RX ORDER — IBUPROFEN 800 MG/1
800 TABLET ORAL EVERY 8 HOURS
Status: DISCONTINUED | OUTPATIENT
Start: 2022-09-07 | End: 2022-09-09 | Stop reason: HOSPADM

## 2022-09-07 RX ORDER — KETOROLAC TROMETHAMINE 30 MG/ML
30 INJECTION, SOLUTION INTRAMUSCULAR; INTRAVENOUS EVERY 6 HOURS
Status: DISPENSED | OUTPATIENT
Start: 2022-09-07 | End: 2022-09-08

## 2022-09-07 RX ORDER — SODIUM CHLORIDE, SODIUM LACTATE, POTASSIUM CHLORIDE, CALCIUM CHLORIDE 600; 310; 30; 20 MG/100ML; MG/100ML; MG/100ML; MG/100ML
INJECTION, SOLUTION INTRAVENOUS CONTINUOUS
Status: DISCONTINUED | OUTPATIENT
Start: 2022-09-07 | End: 2022-09-09 | Stop reason: HOSPADM

## 2022-09-07 RX ORDER — MODIFIED LANOLIN
OINTMENT (GRAM) TOPICAL
Status: DISCONTINUED | OUTPATIENT
Start: 2022-09-07 | End: 2022-09-09 | Stop reason: HOSPADM

## 2022-09-07 RX ORDER — OXYCODONE HYDROCHLORIDE 5 MG/1
10 TABLET ORAL EVERY 4 HOURS PRN
Status: DISCONTINUED | OUTPATIENT
Start: 2022-09-07 | End: 2022-09-09 | Stop reason: HOSPADM

## 2022-09-07 RX ORDER — OXYCODONE HYDROCHLORIDE 5 MG/1
10 TABLET ORAL EVERY 4 HOURS PRN
Status: ACTIVE | OUTPATIENT
Start: 2022-09-07 | End: 2022-09-08

## 2022-09-07 RX ORDER — ACETAMINOPHEN 500 MG
1000 TABLET ORAL ONCE
Status: COMPLETED | OUTPATIENT
Start: 2022-09-07 | End: 2022-09-07

## 2022-09-07 RX ORDER — ONDANSETRON 2 MG/ML
4 INJECTION INTRAMUSCULAR; INTRAVENOUS EVERY 6 HOURS PRN
Status: DISCONTINUED | OUTPATIENT
Start: 2022-09-07 | End: 2022-09-09 | Stop reason: HOSPADM

## 2022-09-07 RX ORDER — KETOROLAC TROMETHAMINE 30 MG/ML
30 INJECTION, SOLUTION INTRAMUSCULAR; INTRAVENOUS EVERY 6 HOURS PRN
Status: DISPENSED | OUTPATIENT
Start: 2022-09-07 | End: 2022-09-08

## 2022-09-07 RX ORDER — OXYCODONE HYDROCHLORIDE 5 MG/1
5 TABLET ORAL EVERY 4 HOURS PRN
Status: ACTIVE | OUTPATIENT
Start: 2022-09-07 | End: 2022-09-08

## 2022-09-07 RX ORDER — TRISODIUM CITRATE DIHYDRATE AND CITRIC ACID MONOHYDRATE 500; 334 MG/5ML; MG/5ML
30 SOLUTION ORAL ONCE
Status: DISCONTINUED | OUTPATIENT
Start: 2022-09-07 | End: 2022-09-09 | Stop reason: HOSPADM

## 2022-09-07 RX ORDER — MORPHINE SULFATE 1 MG/ML
INJECTION, SOLUTION EPIDURAL; INTRATHECAL; INTRAVENOUS PRN
Status: DISCONTINUED | OUTPATIENT
Start: 2022-09-07 | End: 2022-09-07 | Stop reason: SDUPTHER

## 2022-09-07 RX ORDER — DOCUSATE SODIUM 100 MG/1
100 CAPSULE, LIQUID FILLED ORAL 2 TIMES DAILY
Status: DISCONTINUED | OUTPATIENT
Start: 2022-09-07 | End: 2022-09-09 | Stop reason: HOSPADM

## 2022-09-07 RX ORDER — SODIUM CHLORIDE 9 MG/ML
INJECTION, SOLUTION INTRAVENOUS PRN
Status: DISCONTINUED | OUTPATIENT
Start: 2022-09-07 | End: 2022-09-09 | Stop reason: HOSPADM

## 2022-09-07 RX ORDER — PHENYLEPHRINE HCL IN 0.9% NACL 1 MG/10 ML
SYRINGE (ML) INTRAVENOUS PRN
Status: DISCONTINUED | OUTPATIENT
Start: 2022-09-07 | End: 2022-09-07 | Stop reason: SDUPTHER

## 2022-09-07 RX ORDER — PRENATAL WITH FERROUS FUM AND FOLIC ACID 3080; 920; 120; 400; 22; 1.84; 3; 20; 10; 1; 12; 200; 27; 25; 2 [IU]/1; [IU]/1; MG/1; [IU]/1; MG/1; MG/1; MG/1; MG/1; MG/1; MG/1; UG/1; MG/1; MG/1; MG/1; MG/1
1 TABLET ORAL DAILY
Status: DISCONTINUED | OUTPATIENT
Start: 2022-09-07 | End: 2022-09-09 | Stop reason: HOSPADM

## 2022-09-07 RX ADMIN — METOCLOPRAMIDE 10 MG: 5 INJECTION, SOLUTION INTRAMUSCULAR; INTRAVENOUS at 11:16

## 2022-09-07 RX ADMIN — MORPHINE SULFATE 0.15 MG: 1 INJECTION, SOLUTION EPIDURAL; INTRATHECAL; INTRAVENOUS at 12:11

## 2022-09-07 RX ADMIN — ONDANSETRON 4 MG: 2 INJECTION INTRAMUSCULAR; INTRAVENOUS at 12:35

## 2022-09-07 RX ADMIN — Medication 909 ML/HR: at 12:35

## 2022-09-07 RX ADMIN — SODIUM CHLORIDE, POTASSIUM CHLORIDE, SODIUM LACTATE AND CALCIUM CHLORIDE: 600; 310; 30; 20 INJECTION, SOLUTION INTRAVENOUS at 13:04

## 2022-09-07 RX ADMIN — ACETAMINOPHEN 1000 MG: 500 TABLET ORAL at 11:16

## 2022-09-07 RX ADMIN — DOCUSATE SODIUM 100 MG: 100 CAPSULE, LIQUID FILLED ORAL at 21:33

## 2022-09-07 RX ADMIN — ONDANSETRON 4 MG: 2 INJECTION INTRAMUSCULAR; INTRAVENOUS at 18:40

## 2022-09-07 RX ADMIN — KETOROLAC TROMETHAMINE 30 MG: 30 INJECTION, SOLUTION INTRAMUSCULAR; INTRAVENOUS at 21:32

## 2022-09-07 RX ADMIN — SODIUM CHLORIDE, POTASSIUM CHLORIDE, SODIUM LACTATE AND CALCIUM CHLORIDE: 600; 310; 30; 20 INJECTION, SOLUTION INTRAVENOUS at 12:02

## 2022-09-07 RX ADMIN — Medication 100 MCG: at 12:41

## 2022-09-07 RX ADMIN — KETOROLAC TROMETHAMINE 30 MG: 30 INJECTION, SOLUTION INTRAMUSCULAR; INTRAVENOUS at 14:50

## 2022-09-07 RX ADMIN — Medication 100 MCG: at 12:21

## 2022-09-07 RX ADMIN — SODIUM CHLORIDE, POTASSIUM CHLORIDE, SODIUM LACTATE AND CALCIUM CHLORIDE 1000 ML: 600; 310; 30; 20 INJECTION, SOLUTION INTRAVENOUS at 11:17

## 2022-09-07 RX ADMIN — Medication: at 17:14

## 2022-09-07 RX ADMIN — WATER 10 ML: 1 INJECTION INTRAMUSCULAR; INTRAVENOUS; SUBCUTANEOUS at 11:18

## 2022-09-07 RX ADMIN — CEFAZOLIN 2000 MG: 2 INJECTION, POWDER, FOR SOLUTION INTRAMUSCULAR; INTRAVENOUS at 11:15

## 2022-09-07 RX ADMIN — BUPIVACAINE HYDROCHLORIDE 1.6 ML: 7.5 INJECTION, SOLUTION SUBARACHNOID at 12:11

## 2022-09-07 RX ADMIN — FAMOTIDINE 20 MG: 10 INJECTION INTRAVENOUS at 11:16

## 2022-09-07 RX ADMIN — Medication 100 MCG: at 12:24

## 2022-09-07 ASSESSMENT — LIFESTYLE VARIABLES: SMOKING_STATUS: 0

## 2022-09-07 ASSESSMENT — PAIN DESCRIPTION - LOCATION: LOCATION: INCISION

## 2022-09-07 ASSESSMENT — PAIN DESCRIPTION - DESCRIPTORS: DESCRIPTORS: ACHING

## 2022-09-07 ASSESSMENT — PAIN - FUNCTIONAL ASSESSMENT: PAIN_FUNCTIONAL_ASSESSMENT: ACTIVITIES ARE NOT PREVENTED

## 2022-09-07 ASSESSMENT — PAIN SCALES - GENERAL: PAINLEVEL_OUTOF10: 4

## 2022-09-07 ASSESSMENT — PAIN DESCRIPTION - ORIENTATION: ORIENTATION: ANTERIOR;LOWER

## 2022-09-07 NOTE — OP NOTE
PATIENT:    Paige Knott    PREOPERATIVE DIAGNOSES:  1. [id number] 38w5d        2.previous C/S x3, recommended by [facility] to deliver at 38 weeks    POSTOPERATIVE DIAGNOSES:  Same      PROCEDURE:     Repeat low transverse  section. SURGEON:     Padmini Garcia DO    ASSISTANT:    Portillo Rascon DO      ESTIMATED BLOOD LOSS:   500 mL. COMPLICATIONS:     None. ANESTHESIA:    Spinal.         FINDINGS:   Live female         Infant Apgars 9 and 9 at 1 and 5 min respectively. Weight: 6 lbs 6 oz. Uterine adhesions      Normal tubes and ovaries bilaterally. DETAILS OF PROCEDURE:   With the patient in the sitting position, spinal anesthesia was given without any complications. She was then placed in the supine position slightly tilted to the left. Abdomen was scrubbed and draped in the usual manner. Anesthesia level was checked and was found to be adequate. The abdomen was entered thru a transverse incision over the preexisting incision and the Bovie was used to go through the subcutaneous tissue. The fascia was entered in the same plane as the skin incision and  from the underlying rectus abdominis muscles which were  in the midline exposing the parietal peritoneum. The peritoneum was opened bluntly in a longitudinal fashion and stretched. The area was not large enough, so the right sided rectus muscle was incision 1 cm to make room. There were uterine adhesions, that were taken down with scissors. The tissue was then pushed down towards the bladder. An Louise retractor was placed in position and the lower uterine segment was opened in a low transverse fashion. The amniotic cavity was entered and clear amniotic fluid was suctioned out. The baby was then delivered from the Cephalic position and the baby was handed over to the nursery nurse. Cord blood was saved. The placenta was then removed manually and the endometrial cavity was swept clean by a wet lap.   There was some retained tissue on the left fundal side, which was removed manually until the lining was smooth. The edges of the uterine incision were grasped by Allis clamps and the incision itself was closed using a continuous locked suture of 0 monocryl. A second layer was used to imbricate the left half of the incision. The uterine incision was then hemostatic. Bovie was used to coagulate surrounding areas of the adhesions that were taken down. Tubes and ovaries were inspected and appeared to be normal. The gutters were cleared of any blood clots and debris. The jonathan retractor was then removed and the bladder blade was inserted. Adequate hemostasis was still seen. Jaimie powder was placed on the uterine incision. The rectus muscle was reapproximated where it was cut in order to accommodate adequate space. The rectus muscles were then reapproximated in the middle with two loose stitches. Correct needle, sponge and instrument count was reported and the abdomen was then closed in layers using #1 Stratafix for the fascia, 3-0 plain gut for the subcutaneous fat layer and 4-0 vicryl on a keenan needle in subcuticular fashion. A sterile dressing was applied, the uterus was expressed and the patient was then transferred to the recovery room in good stable condition.     Chelle Alvarez, DO

## 2022-09-07 NOTE — ANESTHESIA PROCEDURE NOTES
Spinal Block    Patient location during procedure: OR  Reason for block: primary anesthetic  Staffing  Performed: other anesthesia staff   Anesthesiologist: Susie Beckman DO  Resident/CRNA: JOAN Ojeda - CRNA  Other anesthesia staff: Sam Walls RN  Spinal Block  Patient position: sitting  Prep: ChloraPrep  Patient monitoring: continuous pulse ox and frequent blood pressure checks  Approach: midline  Location: L3/L4  Provider prep: mask and sterile gown  Local infiltration: lidocaine  Needle  Needle type: Pencan   Needle gauge: 25 G  Needle length: 3.5 in  Assessment  Swirl obtained: Yes  CSF: clear  Attempts: 1  Hemodynamics: stable  Preanesthetic Checklist  Completed: patient identified, IV checked, site marked, risks and benefits discussed, surgical/procedural consents, equipment checked, pre-op evaluation, timeout performed, anesthesia consent given, oxygen available and monitors applied/VS acknowledged

## 2022-09-07 NOTE — H&P
Department of Obstetrics and Gynecology  Physician Assistant Obstetrics History and Physical      HISTORY OF PRESENT ILLNESS:      The patient is a 46 y.o.  4 parity 3 at 45 weeks' 5 days' gestation presents to L&D from home for scheduled repeat  section. Current obstetric history is significant for:  Advanced maternal age  1 previous  sections  History of macrosomia in previous pregnancy  History of anemia    Estimated Due Date:  2022  Contractions: Yes  Leaking of fluid: No  Bleeding:  No  Perceived fetal movement: Good        PAST OB HISTORY:  OB History    Para Term  AB Living   4 3 3     3   SAB IAB Ectopic Molar Multiple Live Births           0 3      # Outcome Date GA Lbr Jose Francisco/2nd Weight Sex Delivery Anes PTL Lv   4 Current            3 Term 02/18/15 39w0d  9 lb 1 oz (4.111 kg) M CS-LTranv Spinal N MACKENZIE   2 Term 06/10/11 38w0d  7 lb 3 oz (3.26 kg) M CS-LTranv Spinal N MACKENZIE   1 Term 04/15/05 40w0d  7 lb 3.5 oz (3.274 kg) F CS-LTranv Spinal N MACKENZIE           Pre-eclampsia:  No      :  Yes x 3      D & C:  No      Cerclage:  No      LEEP:  No      Myomectomy:  No       Labor: No    Past Medical History:  Denies hypertension, diabetes, asthma, cardiac or thyroid disease  Diagnosis Date    Abnormal Pap smear of cervix     Depression     Postpartum depression         Past Surgical History:    Procedure Laterality Date     SECTION      HERNIA REPAIR      TONSILLECTOMY AND ADENOIDECTOMY      TYMPANOSTOMY TUBE PLACEMENT          Social History:    Reports that she quit smoking about 20 years ago. She has never used smokeless tobacco. She reports that she does not currently use alcohol. She reports that she does not use drugs.      Blood type: A positive  Antibody screen:   Lab Results   Component Value Date    LABANTI NEG 2022   CBC:   Lab Results   Component Value Date    WBC 13.3 (H) 2022    HGB 11.6 2022    HCT 34.2 2022    MCV 87.7 08/22/2022     08/22/2022   Rubella: Immune   RPR: Non-reactive  Hepatitis B Surface Antigen: Non-reactive  HIV: Non-reactive  Gonorrhea:   Lab Results   Component Value Date    GONDNA NEGATIVE 08/22/2022   Chlamydia:   Lab Results   Component Value Date    LABCHLA NEGATIVE 08/22/2022   Group B Strep: Negative      Medications Prior to Admission:  Medications Prior to Admission: aspirin (ASPIRIN 81) 81 MG chewable tablet, Take 1 tablet by mouth daily  docusate sodium (COLACE) 100 MG capsule,   Miconazole Nitrate-Wipes (MONISTAT 7 COMPLETE THERAPY) 100-2 MG-% KIT, Place 1 suppository vaginally daily (Patient not taking: No sig reported)  bisacodyl (DULCOLAX) 5 MG EC tablet, Take 5 mg by mouth daily as needed for Constipation  vitamin D3 (CHOLECALCIFEROL) 25 MCG (1000 UT) TABS tablet, Take 2 tablets by mouth daily  ferrous sulfate (IRON 325) 325 (65 Fe) MG tablet, Take 1 tablet by mouth 2 times daily  famotidine (PEPCID) 20 MG tablet, Take 1 tablet by mouth 2 times daily  ondansetron (ZOFRAN) 4 MG tablet, Take 1 tablet by mouth every 8 hours as needed for Nausea or Vomiting  Prenatal Vit-Fe Fumarate-FA (PRENATAL VITAMIN PO), Take 1 tablet by mouth daily  naproxen (NAPROSYN) 500 MG tablet, Take 1 tablet by mouth 2 times daily as needed for Pain (Patient not taking: No sig reported)  ibuprofen (IBU) 600 MG tablet, Take 1 tablet by mouth every 8 hours as needed for Pain.  traMADol (ULTRAM) 50 MG tablet, Take 50 mg by mouth every 6 hours as needed. (Patient not taking: No sig reported)  tamsulosin (FLOMAX) 0.4 MG capsule, Take 1 capsule by mouth daily for 30 days. ondansetron (ZOFRAN) 4 MG tablet, Take 1 tablet by mouth every 8 hours as needed for Nausea. (Patient not taking: No sig reported)    Allergies:  Patient has no known allergies.     ROS:  Const: No fever, chills, night sweats, no recent unexplained weight gain/loss  HEENT: No blurred vision, double vision; no ear problems; no sore throat, congestion; no running nose. Resp: No cough, no sputum, no pleuritic chest pain, no sob  Cardio: No chest pain, no exertional dyspnea, no PND, no orthopnea, no palpitation, no leg swelling. GI: No dysphagia, no reflux; no abdominal pain, no n/v; no c/d.  No hematochezia    : No dysuria, no frequency, hesitancy; no hematuria  MSK: no joint pain, no myalgia, no change in ROM  Neuro: no focal weakness in extremities, no slurred speech, no double vision, no numbness or tingling in extremities  Endo: no heat/cold intolerance, no polyphagia, polydipsia or polyuria  Hem: no increased bleeding, no bruising, no lymphadenopathy  Skin: no skin changes  Psych: no depressed mood, no suicidal ideation  Pertinent +'s and -'s addressed in HPI    PHYSICAL EXAM:  /74   Pulse 91   Temp 98.4 °F (36.9 °C) (Oral)   Resp 16   Ht 5' 3\" (1.6 m)   Wt 180 lb (81.6 kg)   LMP 2021   SpO2 99%   BMI 31.89 kg/m²     General appearance: Comfortable  Lungs:  CTA bilaterally, good excursion  Heart:  Regular Rate & Rhythm, no murmur noted  Abdomen:  Soft, non-tender, gravid  Uterus: soft, nontender  Fetal heart rate:  Baseline Heart Rate 135; variability: moderate;  accelerations: present;  decelerations: absent  Cervix: deferred  Presentation: deferred  Contraction frequency:  Q 10 - 12 minutes  Membranes:  Intact  Extremities: (-) edema       ASSESSMENT   47 yo  IUP at 38 weeks' 5 days' gestation    Here for scheduled repeat LTCS    3 previous  section  AMA         Plan: Orders per Dr. Matais Alt:  [facility]  Routine admission and  section orders  Prepare for LTCS  Ancef  Bicitra  Spinal Anesthesia  PCDs  Postpartum Pitocin        Electronically signed by Brad Hale PA-C on 2022 at 11:10 AM

## 2022-09-07 NOTE — FLOWSHEET NOTE
Patient admitted into room and oriented to surroundings. Introduced self and wrote this RN's name and phone extension on patient's white board. Phone and nurse's call light at patient's bedside and instructed to use for any needs. Patient instructed on new admission informational packet at bedside with information on infant testing to be done when infant is 24 hours old including [address] labs, 24 hours blood sugar, and CCHD. Patient instructed on mom baby unit policies and procedures including need to keep infant in bassinet for transport in hallways and for infant to sleep alone, on back, in an empty bassinet. Patient also instructed patient on unit visitation policy and that one same support person 25years old or older may stay overnight if desired. Patient verbalized understanding of all of the above. Patient received Tdap 7/31/2022.

## 2022-09-07 NOTE — PROGRESS NOTES
Patient is a  presenting to L&D for scheduled c/s. Patient denies any vaginal bleeding or leaking of fluids. States +fetal movement. Efm applied.

## 2022-09-07 NOTE — PLAN OF CARE
Problem: Postpartum  Goal: Experiences normal postpartum course  Description:  Postpartum OB-Pregnancy care plan goal which identifies if the mother is experiencing a normal postpartum course  Outcome: Progressing     Problem: Postpartum  Goal: Appropriate maternal -  bonding  Description:  Postpartum OB-Pregnancy care plan goal which identifies if the mother and  are bonding appropriately  Outcome: Progressing     Problem: Postpartum  Goal: Establishment of infant feeding pattern  Description:  Postpartum OB-Pregnancy care plan goal which identifies if the mother is establishing a feeding pattern with their   Outcome: Progressing     Problem: Postpartum  Goal: Incisions, wounds, or drain sites healing without S/S of infection  Outcome: Progressing     Problem: Pain  Goal: Verbalizes/displays adequate comfort level or baseline comfort level  Outcome: Progressing     Problem: Infection - Adult  Goal: Absence of infection at discharge  Outcome: Progressing     Problem: Infection - Adult  Goal: Absence of infection during hospitalization  Outcome: Progressing     Problem: Infection - Adult  Goal: Absence of fever/infection during anticipated neutropenic period  Outcome: Progressing     Problem: Safety - Adult  Goal: Free from fall injury  Outcome: Progressing     Problem: Discharge Planning  Goal: Discharge to home or other facility with appropriate resources  Outcome: Progressing     Problem: ABCDS Injury Assessment  Goal: Absence of physical injury  Outcome: Progressing

## 2022-09-07 NOTE — ANESTHESIA PRE PROCEDURE
Department of Anesthesiology  Preprocedure Note       Name:  Ramu Frazier   Age:  46 y.o.  :  1980                                          MRN:  [id number]         Date:  2022      Surgeon: Umm Chandra):  DO Trini Oakes DO    Procedure: Procedure(s):   SECTION    Medications prior to admission:   Prior to Admission medications    Medication Sig Start Date End Date Taking? Authorizing Provider   aspirin (ASPIRIN 81) 81 MG chewable tablet Take 1 tablet by mouth daily 22   Mendoza Gallo MD   docusate sodium (COLACE) 100 MG capsule  22   Historical Provider, MD   Miconazole Nitrate-Wipes (MONISTAT 7 COMPLETE THERAPY) 100-2 MG-% KIT Place 1 suppository vaginally daily  Patient not taking: No sig reported 22   Mendoza Gallo MD   bisacodyl (DULCOLAX) 5 MG EC tablet Take 5 mg by mouth daily as needed for Constipation    Historical Provider, MD   vitamin D3 (CHOLECALCIFEROL) 25 MCG (1000 UT) TABS tablet Take 2 tablets by mouth daily 22   Mendoza Gallo MD   ferrous sulfate (IRON 325) 325 (65 Fe) MG tablet Take 1 tablet by mouth 2 times daily 22   Mendoza Gallo MD   famotidine (PEPCID) 20 MG tablet Take 1 tablet by mouth 2 times daily 22   Mendoza Gallo MD   ondansetron (ZOFRAN) 4 MG tablet Take 1 tablet by mouth every 8 hours as needed for Nausea or Vomiting 22   Mendoza Gallo MD   Prenatal Vit-Fe Fumarate-FA (PRENATAL VITAMIN PO) Take 1 tablet by mouth daily    Historical Provider, MD   naproxen (NAPROSYN) 500 MG tablet Take 1 tablet by mouth 2 times daily as needed for Pain  Patient not taking: No sig reported 3/13/19   JOAN Dixon - CNP   ibuprofen (IBU) 600 MG tablet Take 1 tablet by mouth every 8 hours as needed for Pain. 2/21/15 8/1/22  JOAN LiuM   traMADol (ULTRAM) 50 MG tablet Take 50 mg by mouth every 6 hours as needed.   Patient not taking: No sig reported    Historical Provider, MD   tamsulosin (FLOMAX) 0.4 MG capsule Take 1 capsule by mouth daily for 30 days. 9/7/13 10/7/13  Dada Calderon MD   ondansetron (ZOFRAN) 4 MG tablet Take 1 tablet by mouth every 8 hours as needed for Nausea.   Patient not taking: No sig reported 13   Dada Calderon MD       Current medications:    Current Facility-Administered Medications   Medication Dose Route Frequency Provider Last Rate Last Admin    lactated ringers infusion   IntraVENous Continuous Meghna Racer, DO        lactated ringers bolus  1,000 mL IntraVENous Once Dinora Finn V, DO 1,000 mL/hr at 22 1117 1,000 mL at 22 1117    citric acid-sodium citrate (BICITRA) solution 30 mL  30 mL Oral Once Dinora Finn V, DO           Allergies:  No Known Allergies    Problem List:    Patient Active Problem List   Diagnosis Code    Occupational exposure in workplace Z57.9    BMI 29.0-29.9,adult Z68.29    Elderly multigravida, third trimester O09.523    History of macrosomia in infant in prior pregnancy, currently pregnant in first trimester O09.291    History of anemia Z86.2    Constipation K59.00    Non-intractable vomiting R11.10    19 weeks gestation of pregnancy Z3A.19    BMI 32.0-32.9,adult Z68.32    Urine protein increased R80.9    Low ferritin R79.0    Low vitamin D level R79.89    Dysuria R30.0    Vaginal irritation N89.8    Bacteriuria during pregnancy in third trimester O99.891, R82.71    Abnormal glucose tolerance test R73.09    Non-reactive NST (non-stress test) O28.8    History of  Z98.891    38 weeks gestation of pregnancy Z3A.38       Past Medical History:        Diagnosis Date    Abnormal Pap smear of cervix     Depression     Postpartum depression        Past Surgical History:        Procedure Laterality Date     SECTION      HERNIA REPAIR      TONSILLECTOMY AND ADENOIDECTOMY      TYMPANOSTOMY TUBE PLACEMENT         Social History:    Social History     Tobacco Use    Smoking status: Former     Types: Cigarettes     Quit date:      Years since quittin.6    Smokeless tobacco: Never   Substance Use Topics    Alcohol use: Not Currently     Comment: occ                                Counseling given: Not Answered      Vital Signs (Current):   Vitals:    22 1034   BP: 128/74   Pulse: 91   Resp: 16   Temp: 36.9 °C (98.4 °F)   TempSrc: Oral   SpO2: 99%   Weight: 180 lb (81.6 kg)   Height: 5' 3\" (1.6 m)                                              BP Readings from Last 3 Encounters:   22 128/74   22 118/60   22 118/76       NPO Status:   22 2300                                                                               BMI:   Wt Readings from Last 3 Encounters:   22 180 lb (81.6 kg)   22 180 lb (81.6 kg)   22 179 lb (81.2 kg)     Body mass index is 31.89 kg/m². CBC:   Lab Results   Component Value Date/Time    WBC 13.3 2022 10:04 AM    RBC 3.90 2022 10:04 AM    HGB 11.6 2022 10:04 AM    HCT 34.2 2022 10:04 AM    MCV 87.7 2022 10:04 AM    RDW 13.4 2022 10:04 AM     2022 10:04 AM       CMP:   Lab Results   Component Value Date/Time     2022 10:04 AM    K 4.0 2022 10:04 AM    CL 99 2022 10:04 AM    CO2 23 2022 10:04 AM    BUN 10 2022 10:04 AM    CREATININE 0.6 2022 10:04 AM    GFRAA >60 2022 10:04 AM    LABGLOM >60 2022 10:04 AM    GLUCOSE 85 2022 10:04 AM    PROT 6.7 2022 10:04 AM    CALCIUM 9.4 2022 10:04 AM    BILITOT 0.3 2022 10:04 AM    ALKPHOS 116 2022 10:04 AM    AST 17 2022 10:04 AM    ALT 11 2022 10:04 AM       POC Tests: No results for input(s): POCGLU, POCNA, POCK, POCCL, POCBUN, POCHEMO, POCHCT in the last 72 hours.     Coags:   Lab Results   Component Value Date/Time    PROTIME 11.3 2022 02:10 PM    INR 1.1 2022 02:10 PM    APTT 27.5 2022 02:10 PM       HCG (If Applicable):   Lab Results Component Value Date    PREGTESTUR neg 09/07/2013        ABGs: No results found for: PHART, PO2ART, ZVJ8MOU, SPI1TKB, BEART, N4SODFEE     Type & Screen (If Applicable):  No results found for: LABABO, LABRH    Drug/Infectious Status (If Applicable):  No results found for: HIV, HEPCAB    COVID-19 Screening (If Applicable):   Lab Results   Component Value Date/Time    COVID19 Not Detected 10/06/2021 12:00 PM    COVID19 Not-Detected 10/06/2021 10:32 AM    COVID19 Not Detected 02/26/2021 11:00 AM           Anesthesia Evaluation  Patient summary reviewed and Nursing notes reviewed no history of anesthetic complications:   Airway: Mallampati: III  TM distance: >3 FB   Neck ROM: full  Mouth opening: > = 3 FB   Dental: normal exam         Pulmonary:Negative Pulmonary ROS breath sounds clear to auscultation      (-) not a current smoker          Patient did not smoke on day of surgery. Cardiovascular:Negative CV ROS  Exercise tolerance: good (>4 METS),   (+) valvular problems/murmurs ( heart murmur since childhood):,     (-) dysrhythmias and  angina      Rhythm: regular  Rate: normal           Beta Blocker:  Not on Beta Blocker         Neuro/Psych:   (+) psychiatric history:depression/anxiety             GI/Hepatic/Renal: Neg GI/Hepatic/Renal ROS  (+) GERD: well controlled,           Endo/Other:    (+) blood dyscrasia ( Aspirin 9/3/22): anticoagulation therapy:., .                  ROS comment: Hx: anemia Abdominal:             Vascular: negative vascular ROS. Other Findings: Parturient abdomen          Anesthesia Plan      spinal     ASA 2     (20g RW)      MIPS: Postoperative opioids intended and Prophylactic antiemetics administered. Anesthetic plan and risks discussed with patient and spouse. Use of blood products discussed with patient whom consented to blood products. Plan discussed with CRNA and attending.                     Dank Doshi RN   9/7/2022    Chart reviewed and patient assessed. Agreed with above note. Aliyah FRANCO CRNA    DOS STAFF ADDENDUM:    Patient seen and chart reviewed on DOS. No interval change in history or exam.   I agree with the anesthesia pre-operative assessment written above and have made the appropriate addendums and/or changes. Considered full stomach.      Sue Elizondo,   September 7, 2022  12:48 PM

## 2022-09-08 LAB
ALBUMIN SERPL-MCNC: 3.1 G/DL (ref 3.5–5.2)
ALP BLD-CCNC: 118 U/L (ref 35–104)
ALT SERPL-CCNC: 9 U/L (ref 0–32)
ANION GAP SERPL CALCULATED.3IONS-SCNC: 10 MMOL/L (ref 7–16)
AST SERPL-CCNC: 22 U/L (ref 0–31)
BASOPHILS ABSOLUTE: 0.07 E9/L (ref 0–0.2)
BASOPHILS RELATIVE PERCENT: 0.6 % (ref 0–2)
BILIRUB SERPL-MCNC: 0.5 MG/DL (ref 0–1.2)
BUN BLDV-MCNC: 11 MG/DL (ref 6–20)
CALCIUM SERPL-MCNC: 9.2 MG/DL (ref 8.6–10.2)
CHLORIDE BLD-SCNC: 100 MMOL/L (ref 98–107)
CO2: 26 MMOL/L (ref 22–29)
CREAT SERPL-MCNC: 0.8 MG/DL (ref 0.5–1)
EOSINOPHILS ABSOLUTE: 0.3 E9/L (ref 0.05–0.5)
EOSINOPHILS RELATIVE PERCENT: 2.6 % (ref 0–6)
GFR AFRICAN AMERICAN: >60
GFR NON-AFRICAN AMERICAN: >60 ML/MIN/1.73
GLUCOSE BLD-MCNC: 107 MG/DL (ref 74–99)
HCT VFR BLD CALC: 31.7 % (ref 34–48)
HEMOGLOBIN: 10.7 G/DL (ref 11.5–15.5)
IMMATURE GRANULOCYTES #: 0.13 E9/L
IMMATURE GRANULOCYTES %: 1.1 % (ref 0–5)
LYMPHOCYTES ABSOLUTE: 2.06 E9/L (ref 1.5–4)
LYMPHOCYTES RELATIVE PERCENT: 18.2 % (ref 20–42)
MCH RBC QN AUTO: 30.1 PG (ref 26–35)
MCHC RBC AUTO-ENTMCNC: 33.8 % (ref 32–34.5)
MCV RBC AUTO: 89.3 FL (ref 80–99.9)
MONOCYTES ABSOLUTE: 0.79 E9/L (ref 0.1–0.95)
MONOCYTES RELATIVE PERCENT: 7 % (ref 2–12)
NEUTROPHILS ABSOLUTE: 7.98 E9/L (ref 1.8–7.3)
NEUTROPHILS RELATIVE PERCENT: 70.5 % (ref 43–80)
PDW BLD-RTO: 12.9 FL (ref 11.5–15)
PLATELET # BLD: 278 E9/L (ref 130–450)
PMV BLD AUTO: 10.4 FL (ref 7–12)
POTASSIUM SERPL-SCNC: 4 MMOL/L (ref 3.5–5)
RBC # BLD: 3.55 E12/L (ref 3.5–5.5)
SODIUM BLD-SCNC: 136 MMOL/L (ref 132–146)
TOTAL PROTEIN: 6.3 G/DL (ref 6.4–8.3)
WBC # BLD: 11.3 E9/L (ref 4.5–11.5)

## 2022-09-08 PROCEDURE — 80053 COMPREHEN METABOLIC PANEL: CPT

## 2022-09-08 PROCEDURE — 36415 COLL VENOUS BLD VENIPUNCTURE: CPT

## 2022-09-08 PROCEDURE — 6360000002 HC RX W HCPCS: Performed by: STUDENT IN AN ORGANIZED HEALTH CARE EDUCATION/TRAINING PROGRAM

## 2022-09-08 PROCEDURE — 6370000000 HC RX 637 (ALT 250 FOR IP): Performed by: STUDENT IN AN ORGANIZED HEALTH CARE EDUCATION/TRAINING PROGRAM

## 2022-09-08 PROCEDURE — 6360000002 HC RX W HCPCS: Performed by: ANESTHESIOLOGY

## 2022-09-08 PROCEDURE — 85025 COMPLETE CBC W/AUTO DIFF WBC: CPT

## 2022-09-08 PROCEDURE — 1220000000 HC SEMI PRIVATE OB R&B

## 2022-09-08 PROCEDURE — 6370000000 HC RX 637 (ALT 250 FOR IP): Performed by: OBSTETRICS & GYNECOLOGY

## 2022-09-08 RX ORDER — SIMETHICONE 125 MG
125 TABLET,CHEWABLE ORAL EVERY 6 HOURS PRN
Status: DISCONTINUED | OUTPATIENT
Start: 2022-09-08 | End: 2022-09-08 | Stop reason: SDUPTHER

## 2022-09-08 RX ORDER — SIMETHICONE 80 MG
120 TABLET,CHEWABLE ORAL EVERY 6 HOURS PRN
Status: DISCONTINUED | OUTPATIENT
Start: 2022-09-08 | End: 2022-09-09 | Stop reason: HOSPADM

## 2022-09-08 RX ORDER — ACETAMINOPHEN 325 MG/1
650 TABLET ORAL EVERY 4 HOURS PRN
Status: DISCONTINUED | OUTPATIENT
Start: 2022-09-08 | End: 2022-09-09 | Stop reason: HOSPADM

## 2022-09-08 RX ORDER — ONDANSETRON 4 MG/1
4 TABLET, FILM COATED ORAL EVERY 8 HOURS PRN
Status: DISCONTINUED | OUTPATIENT
Start: 2022-09-08 | End: 2022-09-09 | Stop reason: HOSPADM

## 2022-09-08 RX ADMIN — KETOROLAC TROMETHAMINE 30 MG: 30 INJECTION, SOLUTION INTRAMUSCULAR; INTRAVENOUS at 04:54

## 2022-09-08 RX ADMIN — NALBUPHINE HYDROCHLORIDE 5 MG: 10 INJECTION, SOLUTION INTRAMUSCULAR; INTRAVENOUS; SUBCUTANEOUS at 01:07

## 2022-09-08 RX ADMIN — DOCUSATE SODIUM 100 MG: 100 CAPSULE, LIQUID FILLED ORAL at 20:22

## 2022-09-08 RX ADMIN — OXYCODONE 5 MG: 5 TABLET ORAL at 20:21

## 2022-09-08 RX ADMIN — ONDANSETRON 4 MG: 2 INJECTION INTRAMUSCULAR; INTRAVENOUS at 01:07

## 2022-09-08 RX ADMIN — IBUPROFEN 800 MG: 800 TABLET, FILM COATED ORAL at 17:14

## 2022-09-08 RX ADMIN — KETOROLAC TROMETHAMINE 30 MG: 30 INJECTION, SOLUTION INTRAMUSCULAR; INTRAVENOUS at 11:30

## 2022-09-08 RX ADMIN — ACETAMINOPHEN 650 MG: 325 TABLET ORAL at 23:13

## 2022-09-08 RX ADMIN — SIMETHICONE 120 MG: 80 TABLET, CHEWABLE ORAL at 23:11

## 2022-09-08 RX ADMIN — DOCUSATE SODIUM 100 MG: 100 CAPSULE, LIQUID FILLED ORAL at 08:51

## 2022-09-08 ASSESSMENT — PAIN DESCRIPTION - LOCATION
LOCATION: INCISION
LOCATION: ABDOMEN
LOCATION: ABDOMEN
LOCATION: ABDOMEN;INCISION

## 2022-09-08 ASSESSMENT — PAIN SCALES - GENERAL
PAINLEVEL_OUTOF10: 4
PAINLEVEL_OUTOF10: 5
PAINLEVEL_OUTOF10: 3
PAINLEVEL_OUTOF10: 7

## 2022-09-08 ASSESSMENT — PAIN DESCRIPTION - ORIENTATION
ORIENTATION: ANTERIOR;LOWER
ORIENTATION: ANTERIOR;LOWER

## 2022-09-08 ASSESSMENT — PAIN DESCRIPTION - DESCRIPTORS
DESCRIPTORS: ACHING;CRAMPING
DESCRIPTORS: ACHING;BURNING
DESCRIPTORS: ACHING;BURNING
DESCRIPTORS: ACHING

## 2022-09-08 ASSESSMENT — PAIN - FUNCTIONAL ASSESSMENT
PAIN_FUNCTIONAL_ASSESSMENT: ACTIVITIES ARE NOT PREVENTED

## 2022-09-08 NOTE — DISCHARGE INSTRUCTIONS
How to Breastfeed: Step by Step  Overview  Breastfeeding is a skill that gets better with practice. Breastfeed your baby whenever your baby is hungry. In the first 2 weeks, your baby will feed at least 8 times in a 24-hour period. Here is a step-by-step guide on how to breastfeed. It shows just one position that you can use for breastfeeding. Talk to your doctor, midwife, or lactation consultant if you are having trouble getting your baby to latch on. How to Breastfeed  Get ready to breastfeed    Sit in a comfortable chair. Support your baby on a pillow on your lap. Support your breast    Support and narrow your breast with one hand using a \"U hold. \" Your thumb will be on the outer side of your breast. Your fingers will be on the inner side. You can also use a \"C hold,\" with all your fingers below the nipple and your thumb above it. Position your baby    Your other arm is behind your baby's back, with your hand supporting the base of the baby's head. Point your fingers and thumb toward your baby's ears. Get baby to open mouth    Touch your baby's Upper lip with your nipple to get your baby's mouth to open. Wait until your baby opens up really wide, like a big yawn. Bring the baby quickly to your breast--not your breast to the baby. Guide your breast into your baby's mouth. Listen for sucking sounds    The nipple and a large part of the darker area around the nipple (areola) should be in the baby's mouth. The baby's lips should be flared out, not folded in. Listen for regular sucking and swallowing sounds while the baby is feeding. If you cannot see or hear swallowing, watch your baby's ears. They will wiggle slightly when the baby swallows. How to break the latch    If you need to take your baby off your breast (for example, to reposition), you will need to break the baby's latch on your nipple. To break your baby's latch, put one finger in the corner of your baby's mouth.   Push your finger between your baby's gums to gently break the latch. If you don't break the latch before you remove your baby, your nipples can become sore, cracked, or bruised. Where can you learn more? Go to [email or web address] and sign in to your [employer] account. Enter I356 in the [employer] box to learn more about \"How to Breastfeed: Step by Step. \"     If you do not have an account, please click on the \"Sign Up Now\" link. Current as of: 2021               Content Version: 13.0  © 6028-6866 [employer]. Care instructions adapted under license by [facility] ([facility]). If you have questions about a medical condition or this instruction, always ask your healthcare professional. [address] any warranty or liability for your use of this information. Follow-up with your OB doctor in 1 week if  delivery or in  6 weeks for vaginal delivery unless otherwise instructed. Call office for an appointment. For breastfeeding support, you can contact our lactation specialists at [phone] or [phone]    DIET  Eat a well balanced diet focusing on foods high in fiber and protein  Drink plenty of fluids especially water. To avoid constipation you may take a mild stool softener as recommended by your doctor or midwife. ACTIVITY  Gradually increase your activity. Resume exercise regimen only after advised by your doctor or midwife. Avoid lifting anything heavier than your baby or a gallon of milk for SIX weeks. Avoid driving until your doctor or midwife has given their approval.  Luigi Poisson slowly from a lying to sitting and then a standing position. Climb stairs one at a time. Use caution when carrying your baby up and down the stairs. No sexual activity for 6 weeks or until advised by your doctor - Nothing in vagina: intercourse, tampons, or douching. Be prepared to discuss family planning at your follow-up OB visit.    You may feel tired or have a lack of energy. You may continue your prenatal vitamin to replenish nutrients post delivery. Nap when baby naps to catch up on sleep. May return to work or school in 6 weeks or as directed by OB. EMOTIONS  You may feed hamilton, sad, teary, & overwhelmed. Contact your OB provider if you feel you may be showing signs of postpartum depression, or have thoughts of harming yourself or your infant. If infant will not stop crying, contact another adult for help or place infant in their crib on their back and take a break. NEVER shake your infant. BLEEDING  Vaginal bleeding will decrease in amount over the next few weeks. You will notice that as your activity increases, your flow may increase. This is your body's way of telling you, you need to take things easier and rest more often. Call your OB/ER if you are saturating more than one maxi pad in an hour. BREAST CARE  Take medications as recommended by your doctor or midwife for pain  If you develop a warm, red, tender area on your breast or develop a fever contact your OB provider. For breastfeeding moms:  If you become engorged, feeding may be more difficult or painful for 1-2 days. You may find it helpful to hand express some milk so that the infant can latch on more easily. While breastfeeding, continue to take your prenatal vitamins as directed by your doctor or midwife. Only take medications verified as safe for breastfeeding. For non-breastfeeding moms:  You may apply ice packs to your breasts over your bra for twenty minutes at a time for comfort. Avoid stimulation to your breasts, when showering allow the water to strike your back not your breasts. Wear a good fitting bra until your milk dries, such as a sports bra. INCISIONAL CARE / TRINA CARE  Clean your incision in the shower with mild soap. After shower pat the incision area dry and leave open to air. If used, Steri-stipes should be removed by 2 weeks.   If used, Staples should be removed by the OB in office by 1 week. If used/ordered, an abdominal binder may provide support for your incision. Use the anthony-bottle after toileting until bleeding stops. Cleanse your perineum from front to back  If used, stitches or internal clips will dissolve in 4-6 weeks. You may use a sitz bath or soak in a clean tub as needed for comfort. Kegel exercises will help restore bladder control. SWELLING  Keep your legs elevated when sitting or lying. When wearing stocking or socks, make sure they are not too tight. WHEN TO CALL THE DOCTOR  If you have a temp of 100.4 or more. If your bleeding has increased and you are saturating a pad in an hour. Your abdomen is tender to touch. You are passing blood clots bigger than the size of a lemon. If you are experiencing extreme weakness or dizziness. If you are having flu-like symptoms such as achy muscles or joints. There is a foul smell or a green color to your vaginal bleeding. If you have pain that cannot be relieved. You have persistent burning with urination or frequency. Call if you have concerns about your well-being. You are unable to sleep, eat, or are having thoughts of harming yourself or your baby. You have swelling, bleeding, drainage, foul odor, redness, or warmth in/around your incision or stitches. You have a red, warm, tender area in you calf.

## 2022-09-08 NOTE — PROGRESS NOTES
Pt up and about in room; states taking general diet w/o  nausea and passing gas and pain meds effective;  at bedside.

## 2022-09-08 NOTE — LACTATION NOTE
Pt is an experienced BF multip but it has been 7 years since she BF. Gentle reminders provided and mother agreeable to assistance. Baby latched well skin to skin on left breast using nipple to nose approach. Other grateful for lesson. Nipple protrude making latch easy. Encouraged skin to skin and frequent attempts at breast to stimulate milk production. Instructed on normal infant behavior in the first 12-24 hours and importance of stimulating the baby frequently to eat during this time. Reviewed hand expression, and encouraged to hand express drops of colostrum when baby is sleepy. Instructed that baby may also feed 8-12 times a day- cluster feeding at times- as her milk supply is being established. Instructed on benefits of skin to skin and avoidance of pacifier / artificial nipple use until breastfeeding is well established. Educated on making sure infant has an open airway while breastfeeding and skin to skin. Instructed on hunger cues and waking techniques to try. Reviewed signs of adequate I & O; allow baby to feed ad sandeep and not to limit time at breast. Breastfeeding booklet provided with review of its contents. Encouraged to call with any concerns. Patient requests Electronic breast pump for home use to increase breast milk supply.

## 2022-09-08 NOTE — ANESTHESIA POSTPROCEDURE EVALUATION
Department of Anesthesiology  Postprocedure Note    Patient: Sumaya Woodard  MRN: [id number]  YOB: 1980  Date of evaluation: 2022      Procedure Summary     Date: 22 Room / Location: [facility]  [facility]    Anesthesia Start:  Anesthesia Stop:     Procedure:  SECTION (Uterus) Diagnosis:       Previous  section      (Previous  section [V87.440])    Surgeons: Chris Rand DO Responsible Provider: Tabby Vaz DO    Anesthesia Type: spinal ASA Status: 2          Anesthesia Type: No value filed. Nereida Phase I: Nereida Score: 9    Nereida Phase II:        Anesthesia Post Evaluation    Patient location during evaluation: bedside  Patient participation: complete - patient participated  Level of consciousness: awake and alert  Pain score: 3  Airway patency: patent  Nausea & Vomiting: no nausea and no vomiting  Complications: no  Cardiovascular status: hemodynamically stable  Respiratory status: acceptable, spontaneous ventilation and room air  Hydration status: stable  Comments: Denies N/V at this time. Pt is getting zofran. Pt denies questions or needs at this time.    Multimodal analgesia pain management approach

## 2022-09-08 NOTE — PROGRESS NOTES
Subjective:    Patient without complaints. Normal lochia. Tolerating PO.   Bowel movements: No  Flatus: Yes  Ambulating: Yes    Objective:  BP (!) 98/57   Pulse 73   Temp 97.7 °F (36.5 °C) (Oral)   Resp 16   Ht 5' 3\" (1.6 m)   Wt 180 lb (81.6 kg)   LMP 12/18/2021   SpO2 98%   Breastfeeding Unknown   BMI 31.89 kg/m²   Lungs:  CTA   Cardiac:  Regular rhythm  Abdomen:  Uterus firm, non-tender, incision covered with mepilex, +bs  Extremities:  No calf pain    Lab Results   Component Value Date    HGB 10.7 (L) 09/08/2022        Assessment:  Post-operative day # 1  ama  Repeat at 38 wks    Plan: Continue current care

## 2022-09-09 VITALS
DIASTOLIC BLOOD PRESSURE: 73 MMHG | SYSTOLIC BLOOD PRESSURE: 120 MMHG | HEIGHT: 63 IN | OXYGEN SATURATION: 96 % | HEART RATE: 77 BPM | TEMPERATURE: 97.4 F | WEIGHT: 180 LBS | RESPIRATION RATE: 16 BRPM | BODY MASS INDEX: 31.89 KG/M2

## 2022-09-09 PROCEDURE — 6370000000 HC RX 637 (ALT 250 FOR IP): Performed by: OBSTETRICS & GYNECOLOGY

## 2022-09-09 PROCEDURE — 6370000000 HC RX 637 (ALT 250 FOR IP): Performed by: STUDENT IN AN ORGANIZED HEALTH CARE EDUCATION/TRAINING PROGRAM

## 2022-09-09 PROCEDURE — 99238 HOSP IP/OBS DSCHRG MGMT 30/<: CPT | Performed by: PHYSICIAN ASSISTANT

## 2022-09-09 RX ORDER — IBUPROFEN 800 MG/1
800 TABLET ORAL EVERY 8 HOURS PRN
Qty: 30 TABLET | Refills: 0 | Status: SHIPPED | OUTPATIENT
Start: 2022-09-09

## 2022-09-09 RX ORDER — HYDROCODONE BITARTRATE AND ACETAMINOPHEN 5; 325 MG/1; MG/1
1 TABLET ORAL EVERY 6 HOURS PRN
Qty: 12 TABLET | Refills: 0 | Status: SHIPPED | OUTPATIENT
Start: 2022-09-09 | End: 2022-09-12

## 2022-09-09 RX ADMIN — METFORMIN HYDROCHLORIDE 1 TABLET: 500 TABLET, EXTENDED RELEASE ORAL at 08:06

## 2022-09-09 RX ADMIN — IBUPROFEN 800 MG: 800 TABLET, FILM COATED ORAL at 06:21

## 2022-09-09 RX ADMIN — OXYCODONE 10 MG: 5 TABLET ORAL at 08:06

## 2022-09-09 RX ADMIN — DOCUSATE SODIUM 100 MG: 100 CAPSULE, LIQUID FILLED ORAL at 08:06

## 2022-09-09 RX ADMIN — OXYCODONE 10 MG: 5 TABLET ORAL at 12:57

## 2022-09-09 RX ADMIN — SIMETHICONE 120 MG: 80 TABLET, CHEWABLE ORAL at 06:21

## 2022-09-09 ASSESSMENT — PAIN SCALES - GENERAL
PAINLEVEL_OUTOF10: 8
PAINLEVEL_OUTOF10: 7

## 2022-09-09 NOTE — PROGRESS NOTES
CLINICAL PHARMACY NOTE: MEDS TO BEDS    Total # of Prescriptions Filled: 2   The following medications were delivered to the patient:  Norco 5/325  Ibuprofen 800    Additional Documentation:

## 2022-09-09 NOTE — LACTATION NOTE
Mom reports baby has been nursing well. Encouraged frequent feeds to establish milk supply. Reviewed benefits and safety of skin to skin. Inst on adequate I/O and importance of keeping track of diapers at home. Instructed on signs of dehydration such as infant refusing to feed, decreased wet diapers and infant becoming listless and notify provider if these occur. Reviewed with mom the importance of notifying the physician if baby looks more jaundiced. Lactation office # given if follow-up needed, as well as other helpful resources. Encouraged to call with any concerns. Support and encouragement given.

## 2022-09-09 NOTE — PROGRESS NOTES
SUBJECTIVE:  Patient without complaint. Voiding and passing flatus. (-) BM yet. Tolerating regular diet. Ambulating. Normal lochia, denies clots. Experienced Mom, breast feeding infant. Denies emotional concerns. Wants to go home. OBJECTIVE:  BP (!) 110/59   Pulse 72   Temp 98 °F (36.7 °C) (Oral)   Resp 16   Ht 5' 3\" (1.6 m)   Wt 180 lb (81.6 kg)   LMP 2021   SpO2 98%  BMI 31.89 kg/m²      Recent Labs     22  0506   WBC 11.3   RBC 3.55   HGB 10.7*   HCT 31.7*   MCV 89.3   MCH 30.1   MCHC 33.8   RDW 12.9      MPV 10.4     CBC on admission 2022: wbc 11.7, Hgb 11.6, Hct 34.5, Plt ct 334    Physical Exam:  General: comfortable  Cor: RRR  Pulm: CTA b/l  Abdomen: soft, appropriately tender. (+) BS x 4 quadrants. Incision covered with Mepilex:  clean, dry, intact   Uterus firm at umbilicus, nontender   Lochia mild rubra  Extremities: (-) edema, (-) calf tenderness    ASSESSMENT/PLAN: 45 yo  38w5d s/p repeat LTCS at 12:34 20. Pregnancy complicated by 3 previous  sections and advanced maternal age.   Postoperative Day #2  Advance care  Discharge today, precautions given  [facility] for dressing removal and incision check  Scripts for Mary Clark PA-C 2022 7:37 AM

## 2022-09-14 ENCOUNTER — POSTPARTUM VISIT (OUTPATIENT)
Dept: OBGYN | Age: 42
End: 2022-09-14
Payer: COMMERCIAL

## 2022-09-14 VITALS
BODY MASS INDEX: 31.28 KG/M2 | WEIGHT: 176.6 LBS | HEART RATE: 64 BPM | SYSTOLIC BLOOD PRESSURE: 131 MMHG | DIASTOLIC BLOOD PRESSURE: 81 MMHG

## 2022-09-14 PROCEDURE — 99213 OFFICE O/P EST LOW 20 MIN: CPT | Performed by: OBSTETRICS & GYNECOLOGY

## 2022-09-14 NOTE — PROGRESS NOTES
HISTORY OF PRESENT ILLNESS:    46 y.o. female  [id number] s/p  section for repeat. Here for 1 week postpartum visit. Pregnancy complicated by previous  section, AMA. The patient has no complaints. Lochia is light. Tolerating regular diet. Denies gastrointestinal or urinary complaints. Mood is good. Baby is breast feeding. Patient would like contraception. Past Medical History:   Past Medical History:   Diagnosis Date    Abnormal Pap smear of cervix     Depression     Postpartum depression                                              OB History    Para Term  AB Living   4 4 4     4   SAB IAB Ectopic Molar Multiple Live Births           0 4      # Outcome Date GA Lbr Jose Francisco/2nd Weight Sex Delivery Anes PTL Lv   4 Term 22 38w5d  6 lb 5.6 oz (2.88 kg) F CS-LTranv Spinal N MACKENZIE   3 Term 02/18/15 39w0d  9 lb 1 oz (4.111 kg) M CS-LTranv Spinal N MACKENZIE   2 Term 06/10/11 38w0d  7 lb 3 oz (3.26 kg) M CS-LTranv Spinal N MACKENZIE   1 Term 04/15/05 40w0d  7 lb 3.5 oz (3.274 kg) F CS-LTranv Spinal N MACKENZIE          Past Surgical History:   Past Surgical History:   Procedure Laterality Date     SECTION       SECTION N/A 2022     SECTION performed by Rachel He DO at [facility] L&D OR    HERNIA REPAIR      TONSILLECTOMY AND ADENOIDECTOMY      TYMPANOSTOMY TUBE PLACEMENT          Allergies: Patient has no known allergies.      Medications:   Current Outpatient Medications   Medication Sig Dispense Refill    ibuprofen (ADVIL;MOTRIN) 800 MG tablet Take 1 tablet by mouth every 8 hours as needed for Pain (cramping) 30 tablet 0    aspirin (ASPIRIN 81) 81 MG chewable tablet Take 1 tablet by mouth daily 30 tablet 6    docusate sodium (COLACE) 100 MG capsule       bisacodyl (DULCOLAX) 5 MG EC tablet Take 5 mg by mouth daily as needed for Constipation      vitamin D3 (CHOLECALCIFEROL) 25 MCG (1000 UT) TABS tablet Take 2 tablets by mouth daily 60 tablet 5    ferrous sulfate (IRON 325) 325 (65 Fe) MG tablet Take 1 tablet by mouth 2 times daily 60 tablet 5    Prenatal Vit-Fe Fumarate-FA (PRENATAL VITAMIN PO) Take 1 tablet by mouth daily      famotidine (PEPCID) 20 MG tablet Take 1 tablet by mouth 2 times daily (Patient not taking: Reported on 2022) 60 tablet 3     No current facility-administered medications for this visit. Social History:   Social History     Tobacco Use    Smoking status: Former     Types: Cigarettes     Quit date:      Years since quittin.7    Smokeless tobacco: Never   Substance Use Topics    Alcohol use: Not Currently     Comment: occ        Family History:   Family History   Problem Relation Age of Onset    Hypertension Father     Diabetes Father     Spont Abortions Mother        REVIEW OF SYSTEMS:    Constitutional: negative  HEENT: negative  Breast: negative  Respiratory: negative  Cardiovascular: negative  Gastrointestinal: negative  Genitourinary:negative  Integument: negative  Neurological: negative  Endocrine: negative          PHYSICAL EXAM  /81 (Position: Sitting)   Pulse 64   Wt 176 lb 9.6 oz (80.1 kg)   LMP 2021   Breastfeeding Yes Comment: has to supplement for a month  BMI 31.28 kg/m²       General appearance: alert, cooperative and in no acute distress. Head: NCAT   Abdomen: soft, nontender, nondistended, no masses palpable, no rebound tenderness, no guarding or rigidity  Incision: C/D/I  Neurologic: Alert and oriented, no focal deficits  Psych: Alert, oriented, mood/affect full range, no acute distress           ASSESSMENT :    Diagnosis Orders   1. Postpartum state             PLAN:    Return in about 5 weeks (around 10/19/2022) for postpartum visit. No orders of the defined types were placed in this encounter. No orders of the defined types were placed in this encounter.         Electronically signed by Paul Bear DO on 22

## 2022-09-14 NOTE — PROGRESS NOTES
Pp visit and incision check  Pp depression screening score a 0  Dressing removed, incision well approximated  Discharge instructions have been discussed with the patient. Patient advised to call our office with any questions or concerns. Voiced understanding.

## 2022-10-05 ENCOUNTER — POSTPARTUM VISIT (OUTPATIENT)
Dept: OBGYN | Age: 42
End: 2022-10-05
Payer: COMMERCIAL

## 2022-10-05 VITALS
SYSTOLIC BLOOD PRESSURE: 126 MMHG | HEIGHT: 63 IN | HEART RATE: 80 BPM | WEIGHT: 166 LBS | BODY MASS INDEX: 29.41 KG/M2 | RESPIRATION RATE: 16 BRPM | DIASTOLIC BLOOD PRESSURE: 76 MMHG

## 2022-10-05 DIAGNOSIS — L03.311 CELLULITIS OF ABDOMINAL WALL: Primary | ICD-10-CM

## 2022-10-05 PROCEDURE — 99213 OFFICE O/P EST LOW 20 MIN: CPT | Performed by: OBSTETRICS & GYNECOLOGY

## 2022-10-05 PROCEDURE — G8427 DOCREV CUR MEDS BY ELIG CLIN: HCPCS | Performed by: OBSTETRICS & GYNECOLOGY

## 2022-10-05 PROCEDURE — 1111F DSCHRG MED/CURRENT MED MERGE: CPT | Performed by: OBSTETRICS & GYNECOLOGY

## 2022-10-05 PROCEDURE — G8417 CALC BMI ABV UP PARAM F/U: HCPCS | Performed by: OBSTETRICS & GYNECOLOGY

## 2022-10-05 PROCEDURE — G8484 FLU IMMUNIZE NO ADMIN: HCPCS | Performed by: OBSTETRICS & GYNECOLOGY

## 2022-10-05 PROCEDURE — 1036F TOBACCO NON-USER: CPT | Performed by: OBSTETRICS & GYNECOLOGY

## 2022-10-05 RX ORDER — CLINDAMYCIN HYDROCHLORIDE 300 MG/1
300 CAPSULE ORAL 3 TIMES DAILY
Qty: 21 CAPSULE | Refills: 0 | Status: SHIPPED | OUTPATIENT
Start: 2022-10-05 | End: 2022-10-12

## 2022-10-05 NOTE — PROGRESS NOTES
Here to recheck her incision today. Delivered 2022 by . Said she started [facility]" last week. Thought maybe was reddened today. Abdomen:  Incision appears to be healing well however the right corner of the skin is slightly reddened. There is no tenderness and no collections are noted. Could be a minor superficial cellulitis. Will treat with cleocin 300 tid for 7 days and recheck next week.

## 2022-10-05 NOTE — PROGRESS NOTES
Here for incision check  Post partum [id number]  Red area but looks clean and dry  Cleocin 300 tidx7  See back to recheck next week.

## 2022-10-11 ENCOUNTER — POSTPARTUM VISIT (OUTPATIENT)
Dept: OBGYN | Age: 42
End: 2022-10-11
Payer: COMMERCIAL

## 2022-10-11 VITALS
BODY MASS INDEX: 29.41 KG/M2 | WEIGHT: 166 LBS | SYSTOLIC BLOOD PRESSURE: 132 MMHG | HEART RATE: 91 BPM | DIASTOLIC BLOOD PRESSURE: 61 MMHG | HEIGHT: 63 IN

## 2022-10-11 DIAGNOSIS — Z98.890 POSTOPERATIVE STATE: ICD-10-CM

## 2022-10-11 PROCEDURE — 1036F TOBACCO NON-USER: CPT | Performed by: OBSTETRICS & GYNECOLOGY

## 2022-10-11 PROCEDURE — G8484 FLU IMMUNIZE NO ADMIN: HCPCS | Performed by: OBSTETRICS & GYNECOLOGY

## 2022-10-11 PROCEDURE — G8417 CALC BMI ABV UP PARAM F/U: HCPCS | Performed by: OBSTETRICS & GYNECOLOGY

## 2022-10-11 PROCEDURE — 99213 OFFICE O/P EST LOW 20 MIN: CPT | Performed by: OBSTETRICS & GYNECOLOGY

## 2022-10-11 PROCEDURE — G8427 DOCREV CUR MEDS BY ELIG CLIN: HCPCS | Performed by: OBSTETRICS & GYNECOLOGY

## 2022-10-11 NOTE — PROGRESS NOTES
Patient alert and pleasant with no concerns   Here today for post-partum exam  Incision intact with no redness or drainage noted. Patient continues on ATB. Has questions about how soon she can breast feed after completing ATB   Discharge instructions have been discussed with the patient. Patient advised to call our office with any questions or concerns. Voiced understanding.

## 2022-10-11 NOTE — PROGRESS NOTES
Here to recheck her incision. Doing well just a bit of discomfort as expexted. Wound is clean, dry, intact. No reddness today. No collections noted. IMP: Normal incison check. PLAN:  RTC 4 weeks for pelvic. Patient instructed.

## 2022-10-26 ENCOUNTER — POSTPARTUM VISIT (OUTPATIENT)
Dept: OBGYN | Age: 42
End: 2022-10-26
Payer: COMMERCIAL

## 2022-10-26 VITALS
SYSTOLIC BLOOD PRESSURE: 105 MMHG | DIASTOLIC BLOOD PRESSURE: 58 MMHG | BODY MASS INDEX: 29.59 KG/M2 | WEIGHT: 167 LBS | HEART RATE: 80 BPM | HEIGHT: 63 IN

## 2022-10-26 DIAGNOSIS — Z98.890 POSTOPERATIVE STATE: ICD-10-CM

## 2022-10-26 PROCEDURE — G8427 DOCREV CUR MEDS BY ELIG CLIN: HCPCS | Performed by: OBSTETRICS & GYNECOLOGY

## 2022-10-26 PROCEDURE — 99212 OFFICE O/P EST SF 10 MIN: CPT | Performed by: OBSTETRICS & GYNECOLOGY

## 2022-10-26 PROCEDURE — 99213 OFFICE O/P EST LOW 20 MIN: CPT | Performed by: OBSTETRICS & GYNECOLOGY

## 2022-10-26 PROCEDURE — G8417 CALC BMI ABV UP PARAM F/U: HCPCS | Performed by: OBSTETRICS & GYNECOLOGY

## 2022-10-26 PROCEDURE — 1036F TOBACCO NON-USER: CPT | Performed by: OBSTETRICS & GYNECOLOGY

## 2022-10-26 PROCEDURE — G8484 FLU IMMUNIZE NO ADMIN: HCPCS | Performed by: OBSTETRICS & GYNECOLOGY

## 2022-10-26 NOTE — PROGRESS NOTES
Here now for her 6 week check fron LTCS delivery. Doing well now. Depression screen is fine. Has no complaints. Oth breast and bottle feeding. Papis current and up to date. Upper PE is WNL all regions and systems. Abdomen: healed and clear today. Pelvic:  Cervix is healed   Uterus:  Well involuted    IMP: normal postpartum and post op exam.    RTC in a year or so. Off work for another 5 weeks.

## 2023-06-19 ENCOUNTER — HOSPITAL ENCOUNTER (OUTPATIENT)
Dept: GENERAL RADIOLOGY | Age: 43
Discharge: HOME | End: 2023-06-21
Payer: COMMERCIAL

## 2023-06-19 ENCOUNTER — HOSPITAL ENCOUNTER (OUTPATIENT)
Age: 43
Discharge: HOME | End: 2023-06-21
Payer: COMMERCIAL

## 2023-06-19 DIAGNOSIS — M25.512 LEFT SHOULDER PAIN, UNSPECIFIED CHRONICITY: ICD-10-CM

## 2023-06-19 PROCEDURE — 73030 X-RAY EXAM OF SHOULDER: CPT

## 2023-09-24 SDOH — ECONOMIC STABILITY: FOOD INSECURITY: WITHIN THE PAST 12 MONTHS, YOU WORRIED THAT YOUR FOOD WOULD RUN OUT BEFORE YOU GOT MONEY TO BUY MORE.: NEVER TRUE

## 2023-09-24 SDOH — ECONOMIC STABILITY: FOOD INSECURITY: WITHIN THE PAST 12 MONTHS, THE FOOD YOU BOUGHT JUST DIDN'T LAST AND YOU DIDN'T HAVE MONEY TO GET MORE.: NEVER TRUE

## 2023-09-24 SDOH — ECONOMIC STABILITY: HOUSING INSECURITY
IN THE LAST 12 MONTHS, WAS THERE A TIME WHEN YOU DID NOT HAVE A STEADY PLACE TO SLEEP OR SLEPT IN A SHELTER (INCLUDING NOW)?: NO

## 2023-09-24 SDOH — ECONOMIC STABILITY: TRANSPORTATION INSECURITY
IN THE PAST 12 MONTHS, HAS LACK OF TRANSPORTATION KEPT YOU FROM MEETINGS, WORK, OR FROM GETTING THINGS NEEDED FOR DAILY LIVING?: NO

## 2023-09-24 SDOH — ECONOMIC STABILITY: INCOME INSECURITY: HOW HARD IS IT FOR YOU TO PAY FOR THE VERY BASICS LIKE FOOD, HOUSING, MEDICAL CARE, AND HEATING?: NOT VERY HARD

## 2023-09-24 ASSESSMENT — PATIENT HEALTH QUESTIONNAIRE - PHQ9
2. FEELING DOWN, DEPRESSED OR HOPELESS: 0
1. LITTLE INTEREST OR PLEASURE IN DOING THINGS: 0
1. LITTLE INTEREST OR PLEASURE IN DOING THINGS: NOT AT ALL
2. FEELING DOWN, DEPRESSED OR HOPELESS: NOT AT ALL
SUM OF ALL RESPONSES TO PHQ QUESTIONS 1-9: 0
SUM OF ALL RESPONSES TO PHQ9 QUESTIONS 1 & 2: 0
SUM OF ALL RESPONSES TO PHQ QUESTIONS 1-9: 0
SUM OF ALL RESPONSES TO PHQ9 QUESTIONS 1 & 2: 0
SUM OF ALL RESPONSES TO PHQ QUESTIONS 1-9: 0
SUM OF ALL RESPONSES TO PHQ QUESTIONS 1-9: 0

## 2023-09-27 ENCOUNTER — OFFICE VISIT (OUTPATIENT)
Dept: OBGYN | Age: 43
End: 2023-09-27
Payer: COMMERCIAL

## 2023-09-27 VITALS
SYSTOLIC BLOOD PRESSURE: 120 MMHG | DIASTOLIC BLOOD PRESSURE: 74 MMHG | HEIGHT: 62 IN | RESPIRATION RATE: 16 BRPM | WEIGHT: 161 LBS | HEART RATE: 82 BPM | BODY MASS INDEX: 29.63 KG/M2

## 2023-09-27 DIAGNOSIS — T14.8XXA BLOOD BLISTER: Primary | ICD-10-CM

## 2023-09-27 PROCEDURE — 99213 OFFICE O/P EST LOW 20 MIN: CPT | Performed by: OBSTETRICS & GYNECOLOGY

## 2023-09-27 PROCEDURE — G8417 CALC BMI ABV UP PARAM F/U: HCPCS | Performed by: OBSTETRICS & GYNECOLOGY

## 2023-09-27 PROCEDURE — 1036F TOBACCO NON-USER: CPT | Performed by: OBSTETRICS & GYNECOLOGY

## 2023-09-27 PROCEDURE — G8427 DOCREV CUR MEDS BY ELIG CLIN: HCPCS | Performed by: OBSTETRICS & GYNECOLOGY

## 2023-09-27 NOTE — PROGRESS NOTES
Here to have a \"little spot\" on the genital area checked. Noticed it a couple weeks age and thinks it may be somewhat darker in color now. Gets irritated sometimes. Pelvic:  Vulva:  Has a tiny \"blood Blister\"  on the left labia major. No more than 1 mm in size. IMP: blood blister    PLAN:  Watch this and if it gets any larger I need to see her back. I feel it will probably go away on it's own.

## 2023-09-29 ENCOUNTER — HOSPITAL ENCOUNTER (OUTPATIENT)
Dept: MRI IMAGING | Age: 43
End: 2023-09-29
Payer: COMMERCIAL

## 2023-09-29 DIAGNOSIS — M25.512 LEFT SHOULDER PAIN, UNSPECIFIED CHRONICITY: ICD-10-CM

## 2023-09-29 PROCEDURE — 73221 MRI JOINT UPR EXTREM W/O DYE: CPT

## 2023-11-29 ENCOUNTER — OFFICE VISIT (OUTPATIENT)
Dept: OBGYN | Age: 43
End: 2023-11-29
Payer: COMMERCIAL

## 2023-11-29 VITALS
HEART RATE: 88 BPM | BODY MASS INDEX: 27.93 KG/M2 | WEIGHT: 152.7 LBS | DIASTOLIC BLOOD PRESSURE: 84 MMHG | SYSTOLIC BLOOD PRESSURE: 129 MMHG

## 2023-11-29 DIAGNOSIS — Z12.31 SCREENING MAMMOGRAM, ENCOUNTER FOR: ICD-10-CM

## 2023-11-29 DIAGNOSIS — N85.2 ENLARGED UTERUS: ICD-10-CM

## 2023-11-29 DIAGNOSIS — Z01.419 ENCOUNTER FOR GYNECOLOGICAL EXAMINATION: Primary | ICD-10-CM

## 2023-11-29 DIAGNOSIS — Z30.09 FAMILY PLANNING: ICD-10-CM

## 2023-11-29 PROCEDURE — G8484 FLU IMMUNIZE NO ADMIN: HCPCS | Performed by: OBSTETRICS & GYNECOLOGY

## 2023-11-29 PROCEDURE — 99396 PREV VISIT EST AGE 40-64: CPT | Performed by: OBSTETRICS & GYNECOLOGY

## 2023-11-29 NOTE — PROGRESS NOTES
Patient alert and pleasant with no complaints  Here today for annual GYN visit. Assisted with pelvic exam, pap smear obtained, labeled  and sent to lab. Discharge instructions have been discussed with the patient. Patient advised to call our office with any questions or concerns. Voiced understanding.
Jennifer (JOSE)      2. Screening mammogram, encounter for  PAP SMEAR      3. Family planning  (Epic) - Rex Hashimoto, MD, OB/GYNJennifer (JOSE)      4. Enlarged uterus  US NON OB TRANSVAGINAL           PLAN:    Return in about 1 year (around 2024) for Annual exam.      No orders of the defined types were placed in this encounter. Orders Placed This Encounter   Procedures    [facility] DIGITAL SCREEN W OR WO CAD BILATERAL     Further imaging can be completed per [facility] protocol     Standing Status:   Future     Standing Expiration Date:   2025     Order Specific Question:   Reason for exam:     Answer:   screening mammogram    US NON OB TRANSVAGINAL     Standing Status:   Future     Standing Expiration Date:   2024    PAP SMEAR     Patient History:    Patient's last menstrual period was 2023 (exact date). OBGYN Status: Unknown  Past Surgical History:  No date:  SECTION  2022:  SECTION; N/A      Comment:   SECTION performed by James Cain DO at [facility]               L&D OR  No date: HERNIA REPAIR  No date: TONSILLECTOMY AND ADENOIDECTOMY  No date: TYMPANOSTOMY TUBE PLACEMENT      Social History    Tobacco Use      Smoking status: Former        Types: Cigarettes        Quit date:         Years since quittin.9      Smokeless tobacco: Never       Standing Status:   Future     Standing Expiration Date:   2024     Order Specific Question:   Collection Type     Answer: Thin Prep     Order Specific Question:   Prior Abnormal Pap Test     Answer:   No     Order Specific Question:   Screening or Diagnostic     Answer:   Screening     Order Specific Question:   Additional STD Testing     Answer:   N/A     Order Specific Question:   HPV Requested?      Answer:   Yes     Order Specific Question:   High Risk Patient     Answer:   N/A    (Epic) - Rex Hashimoto, MD, OB/GYNJennifer (JOSE)     Referral Priority:   Routine     Referral Type:

## 2023-12-05 LAB
GYNECOLOGY CYTOLOGY REPORT: NORMAL
HPV SAMPLE: NORMAL
HPV SOURCE: NORMAL
HPV, GENOTYPE 16: NOT DETECTED
HPV, GENOTYPE 18: NOT DETECTED
HPV, HIGH RISK OTHER: NOT DETECTED
HPV, INTERPRETATION: NORMAL

## 2023-12-26 ENCOUNTER — HOSPITAL ENCOUNTER (OUTPATIENT)
Dept: ULTRASOUND IMAGING | Age: 43
Discharge: HOME | End: 2023-12-28
Payer: COMMERCIAL

## 2023-12-26 DIAGNOSIS — N85.2 ENLARGED UTERUS: ICD-10-CM

## 2023-12-26 PROCEDURE — 76830 TRANSVAGINAL US NON-OB: CPT

## 2023-12-29 ENCOUNTER — HOSPITAL ENCOUNTER (OUTPATIENT)
Dept: MAMMOGRAPHY | Age: 43
End: 2023-12-29
Attending: OBSTETRICS & GYNECOLOGY
Payer: COMMERCIAL

## 2023-12-29 VITALS — WEIGHT: 153 LBS | BODY MASS INDEX: 28.16 KG/M2 | HEIGHT: 62 IN

## 2023-12-29 DIAGNOSIS — Z01.419 ENCOUNTER FOR GYNECOLOGICAL EXAMINATION: ICD-10-CM

## 2023-12-29 PROCEDURE — 77063 BREAST TOMOSYNTHESIS BI: CPT

## 2025-01-23 ENCOUNTER — OFFICE VISIT (OUTPATIENT)
Dept: OBGYN | Age: 45
End: 2025-01-23
Payer: COMMERCIAL

## 2025-01-23 VITALS
DIASTOLIC BLOOD PRESSURE: 85 MMHG | WEIGHT: 152 LBS | OXYGEN SATURATION: 98 % | BODY MASS INDEX: 27.97 KG/M2 | SYSTOLIC BLOOD PRESSURE: 127 MMHG | HEART RATE: 91 BPM | HEIGHT: 62 IN | TEMPERATURE: 98 F

## 2025-01-23 DIAGNOSIS — Z01.419 ENCOUNTER FOR GYNECOLOGICAL EXAMINATION: Primary | ICD-10-CM

## 2025-01-23 DIAGNOSIS — Z12.31 SCREENING MAMMOGRAM, ENCOUNTER FOR: ICD-10-CM

## 2025-01-23 PROCEDURE — 99396 PREV VISIT EST AGE 40-64: CPT | Performed by: OBSTETRICS & GYNECOLOGY

## 2025-01-23 PROCEDURE — 99213 OFFICE O/P EST LOW 20 MIN: CPT | Performed by: OBSTETRICS & GYNECOLOGY

## 2025-01-23 SDOH — ECONOMIC STABILITY: FOOD INSECURITY: WITHIN THE PAST 12 MONTHS, YOU WORRIED THAT YOUR FOOD WOULD RUN OUT BEFORE YOU GOT MONEY TO BUY MORE.: NEVER TRUE

## 2025-01-23 SDOH — ECONOMIC STABILITY: FOOD INSECURITY: WITHIN THE PAST 12 MONTHS, THE FOOD YOU BOUGHT JUST DIDN'T LAST AND YOU DIDN'T HAVE MONEY TO GET MORE.: NEVER TRUE

## 2025-01-23 ASSESSMENT — PATIENT HEALTH QUESTIONNAIRE - PHQ9
SUM OF ALL RESPONSES TO PHQ QUESTIONS 1-9: 1
SUM OF ALL RESPONSES TO PHQ QUESTIONS 1-9: 1
SUM OF ALL RESPONSES TO PHQ9 QUESTIONS 1 & 2: 1
1. LITTLE INTEREST OR PLEASURE IN DOING THINGS: NOT AT ALL
SUM OF ALL RESPONSES TO PHQ QUESTIONS 1-9: 1
2. FEELING DOWN, DEPRESSED OR HOPELESS: SEVERAL DAYS
SUM OF ALL RESPONSES TO PHQ QUESTIONS 1-9: 1

## 2025-01-23 NOTE — PROGRESS NOTES
Here today for her annual exam  Patient has no complaints today  Discharge instructions have been discussed with the patient. Patient advised to call our office with any questions or concerns.   Voiced understanding.

## 2025-01-23 NOTE — PROGRESS NOTES
HISTORY OF PRESENT ILLNESS:    44 y.o. female   presents for her annual exam.     Patient's last menstrual period was 2025 (exact date).  Periods are: regular  Contraception: none, not sexually active.   Number of new sexual partners: 0  Most recent pap smear:  neg cytology, no HPV  History of abnormal pap smears:  HPV, resolved per pt  Most recent mammogram:  normal  History of abnormal mammogram: none  Most recent colonoscopy:   Dexa scan:   HPV vaccination: no, recommended to patient  Changes to health since last visit: none  Complaints: none        Past Medical History:   Past Medical History:   Diagnosis Date    Abnormal Pap smear of cervix     Depression     HPV in female     Postpartum depression                                              OB History    Para Term  AB Living   4 4 4     4   SAB IAB Ectopic Molar Multiple Live Births           0 4      # Outcome Date GA Lbr Jose Francisco/2nd Weight Sex Type Anes PTL Lv   4 Term 22 38w5d  2.88 kg (6 lb 5.6 oz) F CS-LTranv Spinal N MACKENZIE   3 Term 02/18/15 39w0d  4.111 kg (9 lb 1 oz) M CS-LTranv Spinal N MACKENZIE   2 Term 06/10/11 38w0d  3.26 kg (7 lb 3 oz) M CS-LTranv Spinal N MACKENZIE   1 Term 04/15/05 40w0d  3.274 kg (7 lb 3.5 oz) F CS-LTranv Spinal N MACKENZIE          Past Surgical History:   Past Surgical History:   Procedure Laterality Date     SECTION       SECTION N/A 2022     SECTION performed by Maribell KWONG DO at [facility] L&D OR    HERNIA REPAIR      umbilical, done as a baby    TONSILLECTOMY AND ADENOIDECTOMY      TYMPANOSTOMY TUBE PLACEMENT          Allergies: Patient has no known allergies.     Medications:   Current Outpatient Medications   Medication Sig Dispense Refill    ibuprofen (ADVIL;MOTRIN) 800 MG tablet Take 1 tablet by mouth every 8 hours as needed for Pain (cramping) 30 tablet 0    bisacodyl (DULCOLAX) 5 MG EC tablet Take 1 tablet by mouth daily as needed for Constipation (Patient not

## 2025-02-25 ENCOUNTER — ANCILLARY ORDERS (OUTPATIENT)
Dept: OBGYN | Age: 45
End: 2025-02-25

## 2025-02-25 ENCOUNTER — HOSPITAL ENCOUNTER (OUTPATIENT)
Dept: MAMMOGRAPHY | Age: 45
Discharge: HOME | End: 2025-02-27
Attending: OBSTETRICS & GYNECOLOGY
Payer: COMMERCIAL

## 2025-02-25 VITALS — BODY MASS INDEX: 28.16 KG/M2 | HEIGHT: 62 IN | WEIGHT: 153 LBS

## 2025-02-25 DIAGNOSIS — Z01.419 ENCOUNTER FOR GYNECOLOGICAL EXAMINATION: Primary | ICD-10-CM

## 2025-02-25 DIAGNOSIS — R92.30 DENSE BREAST TISSUE ON MAMMOGRAM, UNSPECIFIED TYPE: ICD-10-CM

## 2025-02-25 DIAGNOSIS — Z12.31 SCREENING MAMMOGRAM, ENCOUNTER FOR: ICD-10-CM

## 2025-02-25 PROCEDURE — 77067 SCR MAMMO BI INCL CAD: CPT

## 2025-03-26 ENCOUNTER — HOSPITAL ENCOUNTER (OUTPATIENT)
Dept: ULTRASOUND IMAGING | Age: 45
Discharge: HOME | End: 2025-03-26
Payer: COMMERCIAL

## 2025-03-26 DIAGNOSIS — R92.30 DENSE BREAST TISSUE ON MAMMOGRAM, UNSPECIFIED TYPE: ICD-10-CM

## 2025-03-26 PROCEDURE — 76641 ULTRASOUND BREAST COMPLETE: CPT

## (undated) DEVICE — CESAREAN BIRTH PACK II: Brand: MEDLINE INDUSTRIES, INC.

## (undated) DEVICE — TELFA ADHESIVE ISLAND DRESSING: Brand: TELFA

## (undated) DEVICE — 3000CC GUARDIAN II: Brand: GUARDIAN

## (undated) DEVICE — STAPLER SKIN SQ 30 ABSRB STPL DISP INSORB

## (undated) DEVICE — GLOVE SURG SZ 65 L12IN FNGR THK83MIL CRM POLYISOPRENE

## (undated) DEVICE — COUNTER NDL 30 COUNT DBL MAG

## (undated) DEVICE — CONTAINER,SPEC,PNEUM TUBE,3OZ,STRL PATH: Brand: MEDLINE

## (undated) DEVICE — GLOVE SURG SZ 75 L12IN FNGR THK83MIL CRM POLYISOPRENE

## (undated) DEVICE — Device: Brand: PORTEX

## (undated) DEVICE — TOWEL,OR,DSP,ST,BLUE,STD,6/PK,12PK/CS: Brand: MEDLINE

## (undated) DEVICE — PEN: MARKING STD 100/CS: Brand: MEDICAL ACTION INDUSTRIES

## (undated) DEVICE — SPONGE LAP W18XL18IN WHT COT 4 PLY FLD STRUNG RADPQ DISP ST

## (undated) DEVICE — CATHETERIZATION KIT FOL16 FR 2000 CC DRAINAGE BG LUBRICATH

## (undated) DEVICE — TUBING, SUCTION, 3/16" X 12', STRAIGHT: Brand: MEDLINE

## (undated) DEVICE — TUBE BLD COLLECT ST 1 SIL COAT 7ML 10ML

## (undated) DEVICE — PENCIL ES L3M BTTN SWCH HOLSTER W/ BLDE ELECTRD EDGE

## (undated) DEVICE — COVER,LIGHT HANDLE,FLX,2/PK: Brand: MEDLINE INDUSTRIES, INC.

## (undated) DEVICE — APPLICATOR PREP 26ML 0.7% IOD POVACRYLEX 74% ISO ALC ST

## (undated) DEVICE — GOWN,SIRUS,POLYRNF,BRTHSLV,XLN/XL,20/CS: Brand: MEDLINE

## (undated) DEVICE — GOWN,SIRUS,FABRNF,L,20/CS: Brand: MEDLINE

## (undated) DEVICE — SUTURE VCRL + SZ 0 L36IN ABSRB VLT L36MM CT-1 1/2 CIR VCP346H

## (undated) DEVICE — MEDI-VAC YANKAUER SUCTION HANDLE W/BULBOUS TIP: Brand: CARDINAL HEALTH

## (undated) DEVICE — CONTAINER SPEC 64OZ POLYPR PATH SNAP LOK CAP W/ LID

## (undated) DEVICE — STRIP,CLOSURE,WOUND,MEDI-STRIP,1/2X4: Brand: MEDLINE

## (undated) DEVICE — HYPODERMIC SAFETY NEEDLE: Brand: MAGELLAN

## (undated) DEVICE — BLADE SURG NO20 S STL STR DISP GLASSVAN

## (undated) DEVICE — SUTURE STRATAFIX SPRL SZ 1 L14IN ABSRB VLT L48CM CTX 1/2 SXPD2B405

## (undated) DEVICE — ELECTRODE PT RET AD L9FT HI MOIST COND ADH HYDRGEL CORDED

## (undated) DEVICE — 3M™ STERI-STRIP™ COMPOUND BENZOIN TINCTURE 40 BAGS/CARTON 4 CARTONS/CASE C1544: Brand: 3M™ STERI-STRIP™

## (undated) DEVICE — SHEET,DRAPE,53X77,STERILE: Brand: MEDLINE